# Patient Record
Sex: MALE
[De-identification: names, ages, dates, MRNs, and addresses within clinical notes are randomized per-mention and may not be internally consistent; named-entity substitution may affect disease eponyms.]

---

## 2015-05-08 RX ORDER — INSULIN GLARGINE 100 [IU]/ML
46 INJECTION, SOLUTION SUBCUTANEOUS
Qty: 0 | Refills: 0 | COMMUNITY
Start: 2015-05-08

## 2017-02-06 ENCOUNTER — APPOINTMENT (OUTPATIENT)
Dept: THORACIC SURGERY | Facility: CLINIC | Age: 67
End: 2017-02-06

## 2017-02-06 VITALS
OXYGEN SATURATION: 95 % | DIASTOLIC BLOOD PRESSURE: 81 MMHG | WEIGHT: 270 LBS | HEIGHT: 75 IN | BODY MASS INDEX: 33.57 KG/M2 | SYSTOLIC BLOOD PRESSURE: 121 MMHG | HEART RATE: 94 BPM | RESPIRATION RATE: 16 BRPM

## 2017-02-23 ENCOUNTER — OUTPATIENT (OUTPATIENT)
Dept: OUTPATIENT SERVICES | Facility: HOSPITAL | Age: 67
LOS: 1 days | End: 2017-02-23
Payer: MEDICAID

## 2017-02-23 DIAGNOSIS — Z45.2 ENCOUNTER FOR ADJUSTMENT AND MANAGEMENT OF VASCULAR ACCESS DEVICE: ICD-10-CM

## 2017-02-23 PROCEDURE — 36598 INJ W/FLUOR EVAL CV DEVICE: CPT

## 2017-02-23 PROCEDURE — 96522 REFILL/MAINT PUMP/RESVR SYST: CPT

## 2017-02-23 PROCEDURE — 76000 FLUOROSCOPY <1 HR PHYS/QHP: CPT

## 2017-03-13 ENCOUNTER — APPOINTMENT (OUTPATIENT)
Dept: THORACIC SURGERY | Facility: CLINIC | Age: 67
End: 2017-03-13

## 2017-03-21 ENCOUNTER — OUTPATIENT (OUTPATIENT)
Dept: OUTPATIENT SERVICES | Facility: HOSPITAL | Age: 67
LOS: 1 days | End: 2017-03-21
Payer: MEDICAID

## 2017-03-21 VITALS
TEMPERATURE: 99 F | HEIGHT: 75 IN | DIASTOLIC BLOOD PRESSURE: 87 MMHG | WEIGHT: 288.36 LBS | RESPIRATION RATE: 16 BRPM | SYSTOLIC BLOOD PRESSURE: 152 MMHG | HEART RATE: 70 BPM

## 2017-03-21 DIAGNOSIS — E78.00 PURE HYPERCHOLESTEROLEMIA, UNSPECIFIED: ICD-10-CM

## 2017-03-21 DIAGNOSIS — J98.4 OTHER DISORDERS OF LUNG: ICD-10-CM

## 2017-03-21 DIAGNOSIS — E11.9 TYPE 2 DIABETES MELLITUS WITHOUT COMPLICATIONS: ICD-10-CM

## 2017-03-21 DIAGNOSIS — I10 ESSENTIAL (PRIMARY) HYPERTENSION: ICD-10-CM

## 2017-03-21 DIAGNOSIS — Z95.828 PRESENCE OF OTHER VASCULAR IMPLANTS AND GRAFTS: Chronic | ICD-10-CM

## 2017-03-21 DIAGNOSIS — Z01.818 ENCOUNTER FOR OTHER PREPROCEDURAL EXAMINATION: ICD-10-CM

## 2017-03-21 LAB
ANION GAP SERPL CALC-SCNC: 13 MMOL/L — SIGNIFICANT CHANGE UP (ref 5–17)
APTT BLD: 28.9 SEC — SIGNIFICANT CHANGE UP (ref 27.5–37.4)
BASOPHILS # BLD AUTO: 0 K/UL — SIGNIFICANT CHANGE UP (ref 0–0.2)
BASOPHILS NFR BLD AUTO: 0.3 % — SIGNIFICANT CHANGE UP (ref 0–2)
BUN SERPL-MCNC: 10 MG/DL — SIGNIFICANT CHANGE UP (ref 8–20)
CALCIUM SERPL-MCNC: 9 MG/DL — SIGNIFICANT CHANGE UP (ref 8.6–10.2)
CHLORIDE SERPL-SCNC: 102 MMOL/L — SIGNIFICANT CHANGE UP (ref 98–107)
CO2 SERPL-SCNC: 26 MMOL/L — SIGNIFICANT CHANGE UP (ref 22–29)
CREAT SERPL-MCNC: 0.52 MG/DL — SIGNIFICANT CHANGE UP (ref 0.5–1.3)
EOSINOPHIL # BLD AUTO: 0.2 K/UL — SIGNIFICANT CHANGE UP (ref 0–0.5)
EOSINOPHIL NFR BLD AUTO: 2.5 % — SIGNIFICANT CHANGE UP (ref 0–5)
GLUCOSE SERPL-MCNC: 172 MG/DL — HIGH (ref 70–115)
HCT VFR BLD CALC: 35.1 % — LOW (ref 42–52)
HGB BLD-MCNC: 11.2 G/DL — LOW (ref 14–18)
INR BLD: 1.03 RATIO — SIGNIFICANT CHANGE UP (ref 0.88–1.16)
LYMPHOCYTES # BLD AUTO: 3.4 K/UL — SIGNIFICANT CHANGE UP (ref 1–4.8)
LYMPHOCYTES # BLD AUTO: 44.4 % — SIGNIFICANT CHANGE UP (ref 20–55)
MCHC RBC-ENTMCNC: 24.1 PG — LOW (ref 27–31)
MCHC RBC-ENTMCNC: 31.9 G/DL — LOW (ref 32–36)
MCV RBC AUTO: 75.5 FL — LOW (ref 80–94)
MONOCYTES # BLD AUTO: 0.7 K/UL — SIGNIFICANT CHANGE UP (ref 0–0.8)
MONOCYTES NFR BLD AUTO: 8.9 % — SIGNIFICANT CHANGE UP (ref 3–10)
NEUTROPHILS # BLD AUTO: 3.4 K/UL — SIGNIFICANT CHANGE UP (ref 1.8–8)
NEUTROPHILS NFR BLD AUTO: 43.6 % — SIGNIFICANT CHANGE UP (ref 37–73)
PLATELET # BLD AUTO: 242 K/UL — SIGNIFICANT CHANGE UP (ref 150–400)
POTASSIUM SERPL-MCNC: 3.6 MMOL/L — SIGNIFICANT CHANGE UP (ref 3.5–5.3)
POTASSIUM SERPL-SCNC: 3.6 MMOL/L — SIGNIFICANT CHANGE UP (ref 3.5–5.3)
PROTHROM AB SERPL-ACNC: 11.3 SEC — SIGNIFICANT CHANGE UP (ref 9.8–12.7)
RBC # BLD: 4.65 M/UL — SIGNIFICANT CHANGE UP (ref 4.6–6.2)
RBC # FLD: 18.2 % — HIGH (ref 11–15.6)
SODIUM SERPL-SCNC: 141 MMOL/L — SIGNIFICANT CHANGE UP (ref 135–145)
WBC # BLD: 7.7 K/UL — SIGNIFICANT CHANGE UP (ref 4.8–10.8)
WBC # FLD AUTO: 7.7 K/UL — SIGNIFICANT CHANGE UP (ref 4.8–10.8)

## 2017-03-21 PROCEDURE — 80048 BASIC METABOLIC PNL TOTAL CA: CPT

## 2017-03-21 PROCEDURE — G0463: CPT

## 2017-03-21 PROCEDURE — 93005 ELECTROCARDIOGRAM TRACING: CPT

## 2017-03-21 PROCEDURE — 85027 COMPLETE CBC AUTOMATED: CPT

## 2017-03-21 PROCEDURE — 93010 ELECTROCARDIOGRAM REPORT: CPT

## 2017-03-21 PROCEDURE — 85610 PROTHROMBIN TIME: CPT

## 2017-03-21 PROCEDURE — 85730 THROMBOPLASTIN TIME PARTIAL: CPT

## 2017-03-21 RX ORDER — SODIUM CHLORIDE 9 MG/ML
3 INJECTION INTRAMUSCULAR; INTRAVENOUS; SUBCUTANEOUS ONCE
Qty: 0 | Refills: 0 | Status: DISCONTINUED | OUTPATIENT
Start: 2017-04-04 | End: 2017-04-19

## 2017-03-21 NOTE — ASU PATIENT PROFILE, ADULT - MUTUALITY COMMENT, PROFILE
4-3-17 telephone update re-scheduled from [id number] to need for medical clearance. medical clearance on the chart. Pre-op instructions reviewed with patient, pt verbalized understanding

## 2017-03-21 NOTE — ASU PATIENT PROFILE, ADULT - PMH
Cough    Diabetes    DM (diabetes mellitus)    Hepatitis C  diagnosed a few years ago as per pt no treatment. iv drug use as teenager  High cholesterol    HTN (hypertension)    Hypertension    Lung nodule

## 2017-03-21 NOTE — H&P PST ADULT - HISTORY OF PRESENT ILLNESS
66 year old black male who is scheduled for a [id number] removal and  placement of a new one to continue chemo/immune therapy  for lung cancer, he said the present port is not functional

## 2017-03-21 NOTE — H&P PST ADULT - NSANTHOSAYNRD_GEN_A_CORE
No. MONSERRAT screening performed.  STOP BANG Legend: 0-2 = LOW Risk; 3-4 = INTERMEDIATE Risk; 5-8 = HIGH Risk

## 2017-03-22 DIAGNOSIS — J98.4 OTHER DISORDERS OF LUNG: ICD-10-CM

## 2017-03-22 DIAGNOSIS — Z01.818 ENCOUNTER FOR OTHER PREPROCEDURAL EXAMINATION: ICD-10-CM

## 2017-04-03 ENCOUNTER — RESULT REVIEW (OUTPATIENT)
Age: 67
End: 2017-04-03

## 2017-04-03 RX ORDER — CEFAZOLIN SODIUM 1 G
2000 VIAL (EA) INJECTION ONCE
Qty: 0 | Refills: 0 | Status: DISCONTINUED | OUTPATIENT
Start: 2017-04-04 | End: 2017-04-19

## 2017-04-04 ENCOUNTER — OUTPATIENT (OUTPATIENT)
Dept: OUTPATIENT SERVICES | Facility: HOSPITAL | Age: 67
LOS: 1 days | End: 2017-04-04
Payer: MEDICAID

## 2017-04-04 VITALS
RESPIRATION RATE: 16 BRPM | DIASTOLIC BLOOD PRESSURE: 86 MMHG | TEMPERATURE: 98 F | OXYGEN SATURATION: 98 % | HEART RATE: 73 BPM | SYSTOLIC BLOOD PRESSURE: 140 MMHG

## 2017-04-04 VITALS
HEIGHT: 75 IN | TEMPERATURE: 97 F | DIASTOLIC BLOOD PRESSURE: 80 MMHG | SYSTOLIC BLOOD PRESSURE: 148 MMHG | WEIGHT: 288.36 LBS | RESPIRATION RATE: 16 BRPM | HEART RATE: 96 BPM | OXYGEN SATURATION: 95 %

## 2017-04-04 DIAGNOSIS — J98.4 OTHER DISORDERS OF LUNG: ICD-10-CM

## 2017-04-04 DIAGNOSIS — Z95.828 PRESENCE OF OTHER VASCULAR IMPLANTS AND GRAFTS: Chronic | ICD-10-CM

## 2017-04-04 PROCEDURE — 36590 REMOVAL TUNNELED CV CATH: CPT

## 2017-04-04 PROCEDURE — 88300 SURGICAL PATH GROSS: CPT

## 2017-04-04 PROCEDURE — 76000 FLUOROSCOPY <1 HR PHYS/QHP: CPT

## 2017-04-04 PROCEDURE — 36561 INSERT TUNNELED CV CATH: CPT

## 2017-04-04 PROCEDURE — 88300 SURGICAL PATH GROSS: CPT | Mod: 26

## 2017-04-04 PROCEDURE — 36561 INSERT TUNNELED CV CATH: CPT | Mod: RT

## 2017-04-04 PROCEDURE — 77001 FLUOROGUIDE FOR VEIN DEVICE: CPT | Mod: 26

## 2017-04-04 PROCEDURE — 76937 US GUIDE VASCULAR ACCESS: CPT | Mod: 26

## 2017-04-04 PROCEDURE — C1788: CPT

## 2017-04-04 PROCEDURE — 76942 ECHO GUIDE FOR BIOPSY: CPT

## 2017-04-04 PROCEDURE — 36590 REMOVAL TUNNELED CV CATH: CPT | Mod: LT

## 2017-04-04 NOTE — BRIEF OPERATIVE NOTE - PROCEDURE
Infusaport removal  04/04/2017    Active  [id number]  Infusaport implantation  04/04/2017    Active  [id number]

## 2017-04-04 NOTE — ASU DISCHARGE PLAN (ADULT/PEDIATRIC). - MEDICATION SUMMARY - MEDICATIONS TO TAKE
I will START or STAY ON the medications listed below when I get home from the hospital:    losartan 100 mg oral tablet  -- 1 tab(s) by mouth once a day  -- Indication: For home    NovoLOG 100 units/mL subcutaneous solution  -- 5 unit(s) subcutaneous 3 times a day (before meals)  -- Indication: For home    glimepiride 4 mg oral tablet  -- 1 tab(s) by mouth once a day  -- Indication: For home    Lantus 100 units/mL subcutaneous solution  -- 28 unit(s) subcutaneous once a day  -- Indication: For home    insulin glargine 100 units/mL subcutaneous solution  -- 46 unit(s) subcutaneous once a day (at bedtime)  -- Indication: For home    simvastatin 20 mg oral tablet  -- 1 tab(s) by mouth once a day (at bedtime)  -- Indication: For home    metoprolol succinate 50 mg oral tablet, extended release  -- 1 tab(s) by mouth once a day  -- Indication: For home    amLODIPine 10 mg oral tablet  -- 1 tab(s) by mouth once a day  -- Indication: For home    omeprazole 20 mg oral delayed release capsule  -- 1 cap(s) by mouth once a day  -- Indication: For home

## 2017-04-11 LAB — SURGICAL PATHOLOGY FINAL REPORT - CH: SIGNIFICANT CHANGE UP

## 2017-06-16 ENCOUNTER — EMERGENCY (EMERGENCY)
Facility: HOSPITAL | Age: 67
LOS: 1 days | Discharge: LEFT BEFORE DISCHARGE | End: 2017-06-16
Admitting: EMERGENCY MEDICINE

## 2017-06-16 VITALS — WEIGHT: 274.92 LBS | HEIGHT: 75 IN

## 2017-06-16 VITALS
RESPIRATION RATE: 18 BRPM | TEMPERATURE: 98 F | HEART RATE: 83 BPM | OXYGEN SATURATION: 97 % | DIASTOLIC BLOOD PRESSURE: 65 MMHG | SYSTOLIC BLOOD PRESSURE: 108 MMHG

## 2017-06-16 DIAGNOSIS — Z95.828 PRESENCE OF OTHER VASCULAR IMPLANTS AND GRAFTS: Chronic | ICD-10-CM

## 2017-06-19 ENCOUNTER — INPATIENT (INPATIENT)
Facility: HOSPITAL | Age: 67
LOS: 10 days | Discharge: NURSING, REHAB OR LONG-TERM CARE | DRG: 193 | End: 2017-06-30
Attending: HOSPITALIST | Admitting: INTERNAL MEDICINE
Payer: MEDICARE

## 2017-06-19 VITALS
TEMPERATURE: 98 F | HEART RATE: 90 BPM | DIASTOLIC BLOOD PRESSURE: 60 MMHG | SYSTOLIC BLOOD PRESSURE: 118 MMHG | RESPIRATION RATE: 28 BRPM | WEIGHT: 270.07 LBS | OXYGEN SATURATION: 94 %

## 2017-06-19 DIAGNOSIS — Z95.828 PRESENCE OF OTHER VASCULAR IMPLANTS AND GRAFTS: Chronic | ICD-10-CM

## 2017-06-19 DIAGNOSIS — R06.02 SHORTNESS OF BREATH: ICD-10-CM

## 2017-06-19 LAB
ALBUMIN SERPL ELPH-MCNC: 3.5 G/DL — SIGNIFICANT CHANGE UP (ref 3.3–5.2)
ALBUMIN SERPL ELPH-MCNC: 3.6 G/DL — SIGNIFICANT CHANGE UP (ref 3.3–5.2)
ALP SERPL-CCNC: 82 U/L — SIGNIFICANT CHANGE UP (ref 40–120)
ALP SERPL-CCNC: 91 U/L — SIGNIFICANT CHANGE UP (ref 40–120)
ALT FLD-CCNC: 14 U/L — SIGNIFICANT CHANGE UP
ALT FLD-CCNC: 16 U/L — SIGNIFICANT CHANGE UP
ANION GAP SERPL CALC-SCNC: 20 MMOL/L — HIGH (ref 5–17)
ANION GAP SERPL CALC-SCNC: 21 MMOL/L — HIGH (ref 5–17)
APTT BLD: 26.9 SEC — LOW (ref 27.5–37.4)
AST SERPL-CCNC: 10 U/L — SIGNIFICANT CHANGE UP
AST SERPL-CCNC: 14 U/L — SIGNIFICANT CHANGE UP
BASOPHILS # BLD AUTO: 0 K/UL — SIGNIFICANT CHANGE UP (ref 0–0.2)
BILIRUB SERPL-MCNC: 0.6 MG/DL — SIGNIFICANT CHANGE UP (ref 0.4–2)
BILIRUB SERPL-MCNC: 0.7 MG/DL — SIGNIFICANT CHANGE UP (ref 0.4–2)
BUN SERPL-MCNC: 53 MG/DL — HIGH (ref 8–20)
BUN SERPL-MCNC: 61 MG/DL — HIGH (ref 8–20)
CALCIUM SERPL-MCNC: 9.7 MG/DL — SIGNIFICANT CHANGE UP (ref 8.6–10.2)
CALCIUM SERPL-MCNC: 9.8 MG/DL — SIGNIFICANT CHANGE UP (ref 8.6–10.2)
CHLORIDE SERPL-SCNC: 93 MMOL/L — LOW (ref 98–107)
CHLORIDE SERPL-SCNC: 94 MMOL/L — LOW (ref 98–107)
CO2 SERPL-SCNC: 16 MMOL/L — LOW (ref 22–29)
CO2 SERPL-SCNC: 18 MMOL/L — LOW (ref 22–29)
CREAT SERPL-MCNC: 2.19 MG/DL — HIGH (ref 0.5–1.3)
CREAT SERPL-MCNC: 2.86 MG/DL — HIGH (ref 0.5–1.3)
GAS PNL BLDA: SIGNIFICANT CHANGE UP
GLUCOSE SERPL-MCNC: 528 MG/DL — CRITICAL HIGH (ref 70–115)
GLUCOSE SERPL-MCNC: 541 MG/DL — CRITICAL HIGH (ref 70–115)
HCT VFR BLD CALC: 44.9 % — SIGNIFICANT CHANGE UP (ref 42–52)
HGB BLD-MCNC: 15 G/DL — SIGNIFICANT CHANGE UP (ref 14–18)
INR BLD: 1.42 RATIO — HIGH (ref 0.88–1.16)
LYMPHOCYTES # BLD AUTO: 2 K/UL — SIGNIFICANT CHANGE UP (ref 1–4.8)
LYMPHOCYTES # BLD AUTO: 9 % — LOW (ref 20–55)
MCHC RBC-ENTMCNC: 25.3 PG — LOW (ref 27–31)
MCHC RBC-ENTMCNC: 33.4 G/DL — SIGNIFICANT CHANGE UP (ref 32–36)
MCV RBC AUTO: 75.8 FL — LOW (ref 80–94)
MONOCYTES # BLD AUTO: 1.3 K/UL — HIGH (ref 0–0.8)
MONOCYTES NFR BLD AUTO: 6 % — SIGNIFICANT CHANGE UP (ref 3–10)
NEUTROPHILS # BLD AUTO: 18.4 K/UL — HIGH (ref 1.8–8)
NEUTROPHILS NFR BLD AUTO: 84.4 % — HIGH (ref 37–73)
PLATELET # BLD AUTO: 212 K/UL — SIGNIFICANT CHANGE UP (ref 150–400)
POTASSIUM SERPL-MCNC: 5.6 MMOL/L — HIGH (ref 3.5–5.3)
POTASSIUM SERPL-MCNC: 6.8 MMOL/L — CRITICAL HIGH (ref 3.5–5.3)
POTASSIUM SERPL-SCNC: 5.6 MMOL/L — HIGH (ref 3.5–5.3)
POTASSIUM SERPL-SCNC: 6.8 MMOL/L — CRITICAL HIGH (ref 3.5–5.3)
PROT SERPL-MCNC: 9.2 G/DL — HIGH (ref 6.6–8.7)
PROT SERPL-MCNC: 9.6 G/DL — HIGH (ref 6.6–8.7)
PROTHROM AB SERPL-ACNC: 15.7 SEC — HIGH (ref 9.8–12.7)
RBC # BLD: 5.92 M/UL — SIGNIFICANT CHANGE UP (ref 4.6–6.2)
RBC # FLD: 15.8 % — HIGH (ref 11–15.6)
SODIUM SERPL-SCNC: 131 MMOL/L — LOW (ref 135–145)
SODIUM SERPL-SCNC: 131 MMOL/L — LOW (ref 135–145)
WBC # BLD: 21.8 K/UL — HIGH (ref 4.8–10.8)
WBC # FLD AUTO: 21.8 K/UL — HIGH (ref 4.8–10.8)

## 2017-06-19 PROCEDURE — 74176 CT ABD & PELVIS W/O CONTRAST: CPT | Mod: 26

## 2017-06-19 PROCEDURE — 99291 CRITICAL CARE FIRST HOUR: CPT

## 2017-06-19 PROCEDURE — 71250 CT THORAX DX C-: CPT | Mod: 26

## 2017-06-19 PROCEDURE — 71010: CPT | Mod: 26

## 2017-06-19 RX ORDER — CEFEPIME 1 G/1
1000 INJECTION, POWDER, FOR SOLUTION INTRAMUSCULAR; INTRAVENOUS EVERY 12 HOURS
Qty: 0 | Refills: 0 | Status: COMPLETED | OUTPATIENT
Start: 2017-06-20 | End: 2017-06-26

## 2017-06-19 RX ORDER — VANCOMYCIN HCL 1 G
1000 VIAL (EA) INTRAVENOUS ONCE
Qty: 0 | Refills: 0 | Status: COMPLETED | OUTPATIENT
Start: 2017-06-19 | End: 2017-06-20

## 2017-06-19 RX ORDER — SODIUM BICARBONATE 1 MEQ/ML
50 SYRINGE (ML) INTRAVENOUS ONCE
Qty: 0 | Refills: 0 | Status: COMPLETED | OUTPATIENT
Start: 2017-06-19 | End: 2017-06-19

## 2017-06-19 RX ORDER — AZITHROMYCIN 500 MG/1
500 TABLET, FILM COATED ORAL EVERY 24 HOURS
Qty: 0 | Refills: 0 | Status: DISCONTINUED | OUTPATIENT
Start: 2017-06-20 | End: 2017-06-21

## 2017-06-19 RX ORDER — AZITHROMYCIN 500 MG/1
TABLET, FILM COATED ORAL
Qty: 0 | Refills: 0 | Status: DISCONTINUED | OUTPATIENT
Start: 2017-06-19 | End: 2017-06-21

## 2017-06-19 RX ORDER — CEFEPIME 1 G/1
INJECTION, POWDER, FOR SOLUTION INTRAMUSCULAR; INTRAVENOUS
Qty: 0 | Refills: 0 | Status: DISCONTINUED | OUTPATIENT
Start: 2017-06-19 | End: 2017-06-19

## 2017-06-19 RX ORDER — SODIUM CHLORIDE 9 MG/ML
1000 INJECTION INTRAMUSCULAR; INTRAVENOUS; SUBCUTANEOUS
Qty: 0 | Refills: 0 | Status: DISCONTINUED | OUTPATIENT
Start: 2017-06-19 | End: 2017-06-20

## 2017-06-19 RX ORDER — SODIUM POLYSTYRENE SULFONATE 4.1 MEQ/G
30 POWDER, FOR SUSPENSION ORAL ONCE
Qty: 0 | Refills: 0 | Status: COMPLETED | OUTPATIENT
Start: 2017-06-19 | End: 2017-06-19

## 2017-06-19 RX ORDER — AZITHROMYCIN 500 MG/1
500 TABLET, FILM COATED ORAL ONCE
Qty: 0 | Refills: 0 | Status: COMPLETED | OUTPATIENT
Start: 2017-06-19 | End: 2017-06-19

## 2017-06-19 RX ORDER — MORPHINE SULFATE 50 MG/1
8 CAPSULE, EXTENDED RELEASE ORAL ONCE
Qty: 0 | Refills: 0 | Status: DISCONTINUED | OUTPATIENT
Start: 2017-06-19 | End: 2017-06-19

## 2017-06-19 RX ORDER — HEPARIN SODIUM 5000 [USP'U]/ML
5000 INJECTION INTRAVENOUS; SUBCUTANEOUS EVERY 12 HOURS
Qty: 0 | Refills: 0 | Status: DISCONTINUED | OUTPATIENT
Start: 2017-06-19 | End: 2017-06-20

## 2017-06-19 RX ORDER — DEXTROSE 50 % IN WATER 50 %
25 SYRINGE (ML) INTRAVENOUS
Qty: 0 | Refills: 0 | Status: DISCONTINUED | OUTPATIENT
Start: 2017-06-19 | End: 2017-06-23

## 2017-06-19 RX ORDER — CEFEPIME 1 G/1
1000 INJECTION, POWDER, FOR SOLUTION INTRAMUSCULAR; INTRAVENOUS ONCE
Qty: 0 | Refills: 0 | Status: COMPLETED | OUTPATIENT
Start: 2017-06-19 | End: 2017-06-19

## 2017-06-19 RX ORDER — SODIUM CHLORIDE 9 MG/ML
3 INJECTION INTRAMUSCULAR; INTRAVENOUS; SUBCUTANEOUS ONCE
Qty: 0 | Refills: 0 | Status: COMPLETED | OUTPATIENT
Start: 2017-06-19 | End: 2017-06-19

## 2017-06-19 RX ORDER — DEXTROSE 50 % IN WATER 50 %
50 SYRINGE (ML) INTRAVENOUS
Qty: 0 | Refills: 0 | Status: DISCONTINUED | OUTPATIENT
Start: 2017-06-19 | End: 2017-06-20

## 2017-06-19 RX ORDER — INSULIN HUMAN 100 [IU]/ML
5 INJECTION, SOLUTION SUBCUTANEOUS
Qty: 100 | Refills: 0 | Status: DISCONTINUED | OUTPATIENT
Start: 2017-06-19 | End: 2017-06-21

## 2017-06-19 RX ORDER — INSULIN HUMAN 100 [IU]/ML
15 INJECTION, SOLUTION SUBCUTANEOUS ONCE
Qty: 0 | Refills: 0 | Status: COMPLETED | OUTPATIENT
Start: 2017-06-19 | End: 2017-06-19

## 2017-06-19 RX ORDER — CEFEPIME 1 G/1
INJECTION, POWDER, FOR SOLUTION INTRAMUSCULAR; INTRAVENOUS
Qty: 0 | Refills: 0 | Status: COMPLETED | OUTPATIENT
Start: 2017-06-19 | End: 2017-06-26

## 2017-06-19 RX ADMIN — INSULIN HUMAN 15 UNIT(S): 100 INJECTION, SOLUTION SUBCUTANEOUS at 18:43

## 2017-06-19 RX ADMIN — Medication 50 MILLIEQUIVALENT(S): at 18:43

## 2017-06-19 RX ADMIN — AZITHROMYCIN 255 MILLIGRAM(S): 500 TABLET, FILM COATED ORAL at 22:30

## 2017-06-19 RX ADMIN — CEFEPIME 100 MILLIGRAM(S): 1 INJECTION, POWDER, FOR SOLUTION INTRAMUSCULAR; INTRAVENOUS at 20:52

## 2017-06-19 RX ADMIN — INSULIN HUMAN 5 UNIT(S)/HR: 100 INJECTION, SOLUTION SUBCUTANEOUS at 21:00

## 2017-06-19 RX ADMIN — SODIUM CHLORIDE 3 MILLILITER(S): 9 INJECTION INTRAMUSCULAR; INTRAVENOUS; SUBCUTANEOUS at 18:43

## 2017-06-19 RX ADMIN — SODIUM POLYSTYRENE SULFONATE 30 GRAM(S): 4.1 POWDER, FOR SUSPENSION ORAL at 18:43

## 2017-06-19 RX ADMIN — MORPHINE SULFATE 8 MILLIGRAM(S): 50 CAPSULE, EXTENDED RELEASE ORAL at 17:57

## 2017-06-19 RX ADMIN — SODIUM CHLORIDE 150 MILLILITER(S): 9 INJECTION INTRAMUSCULAR; INTRAVENOUS; SUBCUTANEOUS at 19:22

## 2017-06-19 RX ADMIN — MORPHINE SULFATE 8 MILLIGRAM(S): 50 CAPSULE, EXTENDED RELEASE ORAL at 18:10

## 2017-06-19 NOTE — ED PROVIDER NOTE - OBJECTIVE STATEMENT
66 yo M with hx of lung Ca presents to ED after having chemotx today c/o increasing abd pain and SOB since yesterday.  Pt c/o diffuse periumbilical abd pain which radiates to L upper abd/chest area.  Pt denies any CP.  Pt noted to be extremely diaphoretic

## 2017-06-19 NOTE — ED ADULT NURSE REASSESSMENT NOTE - NS ED NURSE REASSESS COMMENT FT1
Assumed pt. care from Joseph KITCHEN. Pt. is a/o x 3 connected to the cardiac monitor. Pt. is on BiPap sat 94% on 50% O2. Pt. is resting in bed comfortably and denies any pain at this time. Pt. has equal bilateral chest rise with equal pulses bilat. Pt. shows no signs of apparent distress at this time. Called report to transport pt. to MICU.

## 2017-06-19 NOTE — ED PROVIDER NOTE - CONSTITUTIONAL, MLM
normal... Awake, alert, oriented to person, place, time/situation,  diaphoretic and appears uncomfortable

## 2017-06-19 NOTE — H&P ADULT - ATTENDING COMMENTS
I have seen and evaluated the patient and agree with the assessment and plan with the following additions:    PNA - vanco renal dosed, cefepime and azithro    Lung CA on chemo    DM: insulin drip    Pain - unclear cause, meds as needed ? pleuric pain

## 2017-06-19 NOTE — H&P ADULT - HISTORY OF PRESENT ILLNESS
67M with a h/o DM, HTN, Hep C, former IVDA, with NSC Lung Ca, currently on Chemo for the last 2 years. had Chemo today, it was stopped because he didnt feel well. and he was sent to the ER. he was found to be diaphoretic, and in respiratory distress with O2 sats that were in the low 90s. Initial Labs revealed a WBC 21K, K 6.8,lacate of 2.1, BUN/Crt 52/2.2 and a blood glucose of 544.  His CXR revealed a New large LLL infiltrate. He was started on IV fluids, ultures were sent and He was begun on IV abx. His EKG showed no ischemic changes.

## 2017-06-19 NOTE — ED ADULT TRIAGE NOTE - CHIEF COMPLAINT QUOTE
Hx of lung cancer sent from hematology/oncology center after chemo treatment for left sided abdominal pain and SOB.  Pt arrives diaphoretic, alert and oriented X 3.  Pain to LUQ.

## 2017-06-19 NOTE — ED ADULT NURSE NOTE - OBJECTIVE STATEMENT
Patient recd this afternoon A/Ox3, VSS, presents to ED c/o SOB that started last night.  Labor breathing noted, patient diaphoretic since last night.  Patient sent in by oncologist office.  Patient reports left sided chest pain.  Respirations are even and labored, lungs cta, +bowel x4 quads, abdomen soft, nontender/nondistended, skin w/d/i.

## 2017-06-19 NOTE — H&P ADULT - ASSESSMENT
IMpression/Plan:    1) Respiratory Distress/Hypoxemia: Likely related to new pneumonia. With his immunocompromised state will cover with Cefepime/Vanco, given his healthcare associated visits and atypicals with Azithromycin, Doubt PE at this time, given his presentation of worsening over days and CXR findings. Will obtain a CT of [facility] chest now. Also add nebs and nocturnal BIPAP. Will add SQ heparin for DVT prophylaxis given his elevated BUN/Crt    2) Acute renal failure with hyperkalemia and metabolic acidosis: Likely prerenal in nature, with severe hyperglycemia and osmotic diuresis. Will hydrate with NS at this point, repeat Electrolytes. Kayexalate given as well as insulin.  Will avoid nephrotoxic drugs and F/U     3) Severe Hyperglycemia with metabolic acidosis: Can not exclude mild DKA, although the acidosis appears to be largely related to the ARF and Sepsis. Will treat with an insulin drip and and follow electrolytes at this point.    4) NSC Lung CA: With septic state will need to stop further CHemo at this point. Will contact Dr Madrid, his oncologist to discuss recent care and plans:      A total of 60 mins was spent evaluating and treating this critical patient

## 2017-06-19 NOTE — ED PROVIDER NOTE - CARE PLAN
Principal Discharge DX:	Shortness of breath  Secondary Diagnosis:	Renal failure  Secondary Diagnosis:	Lung cancer

## 2017-06-19 NOTE — ED PROVIDER NOTE - CRITICAL CARE PROVIDED
additional history taking/consultation with other physicians/documentation/interpretation of diagnostic studies/direct patient care (not related to procedure)

## 2017-06-20 LAB
ANION GAP SERPL CALC-SCNC: 17 MMOL/L — SIGNIFICANT CHANGE UP (ref 5–17)
ANION GAP SERPL CALC-SCNC: 19 MMOL/L — HIGH (ref 5–17)
APPEARANCE UR: CLEAR — SIGNIFICANT CHANGE UP
BACTERIA # UR AUTO: ABNORMAL
BILIRUB UR-MCNC: NEGATIVE — SIGNIFICANT CHANGE UP
BUN SERPL-MCNC: 62 MG/DL — HIGH (ref 8–20)
BUN SERPL-MCNC: 68 MG/DL — HIGH (ref 8–20)
CALCIUM SERPL-MCNC: 8.9 MG/DL — SIGNIFICANT CHANGE UP (ref 8.6–10.2)
CALCIUM SERPL-MCNC: 8.9 MG/DL — SIGNIFICANT CHANGE UP (ref 8.6–10.2)
CHLORIDE SERPL-SCNC: 100 MMOL/L — SIGNIFICANT CHANGE UP (ref 98–107)
CHLORIDE SERPL-SCNC: 95 MMOL/L — LOW (ref 98–107)
CO2 SERPL-SCNC: 18 MMOL/L — LOW (ref 22–29)
CO2 SERPL-SCNC: 18 MMOL/L — LOW (ref 22–29)
COLOR SPEC: YELLOW — SIGNIFICANT CHANGE UP
CREAT SERPL-MCNC: 2.34 MG/DL — HIGH (ref 0.5–1.3)
CREAT SERPL-MCNC: 2.71 MG/DL — HIGH (ref 0.5–1.3)
DIFF PNL FLD: NEGATIVE — SIGNIFICANT CHANGE UP
EPI CELLS # UR: SIGNIFICANT CHANGE UP
GLUCOSE SERPL-MCNC: 156 MG/DL — HIGH (ref 70–115)
GLUCOSE SERPL-MCNC: 191 MG/DL — HIGH (ref 70–115)
GLUCOSE UR QL: 250 MG/DL
GRAN CASTS # UR COMP ASSIST: ABNORMAL /LPF
HBA1C BLD-MCNC: 12.6 % — HIGH (ref 4–5.6)
HCT VFR BLD CALC: 38.1 % — LOW (ref 42–52)
HGB BLD-MCNC: 12.7 G/DL — LOW (ref 14–18)
KETONES UR-MCNC: NEGATIVE — SIGNIFICANT CHANGE UP
LACTATE BLDV-MCNC: 1.2 MMOL/L — SIGNIFICANT CHANGE UP (ref 0.5–2)
LEUKOCYTE ESTERASE UR-ACNC: NEGATIVE — SIGNIFICANT CHANGE UP
MCHC RBC-ENTMCNC: 25.3 PG — LOW (ref 27–31)
MCHC RBC-ENTMCNC: 33.3 G/DL — SIGNIFICANT CHANGE UP (ref 32–36)
MCV RBC AUTO: 76 FL — LOW (ref 80–94)
NITRITE UR-MCNC: NEGATIVE — SIGNIFICANT CHANGE UP
PH UR: 5 — SIGNIFICANT CHANGE UP (ref 5–8)
PLATELET # BLD AUTO: 179 K/UL — SIGNIFICANT CHANGE UP (ref 150–400)
POTASSIUM SERPL-MCNC: 4.5 MMOL/L — SIGNIFICANT CHANGE UP (ref 3.5–5.3)
POTASSIUM SERPL-MCNC: 4.8 MMOL/L — SIGNIFICANT CHANGE UP (ref 3.5–5.3)
POTASSIUM SERPL-SCNC: 4.5 MMOL/L — SIGNIFICANT CHANGE UP (ref 3.5–5.3)
POTASSIUM SERPL-SCNC: 4.8 MMOL/L — SIGNIFICANT CHANGE UP (ref 3.5–5.3)
PROT UR-MCNC: 15 MG/DL
RBC # BLD: 5.01 M/UL — SIGNIFICANT CHANGE UP (ref 4.6–6.2)
RBC # FLD: 15.9 % — HIGH (ref 11–15.6)
RBC CASTS # UR COMP ASSIST: SIGNIFICANT CHANGE UP /HPF (ref 0–4)
SODIUM SERPL-SCNC: 132 MMOL/L — LOW (ref 135–145)
SODIUM SERPL-SCNC: 135 MMOL/L — SIGNIFICANT CHANGE UP (ref 135–145)
SP GR SPEC: 1.02 — SIGNIFICANT CHANGE UP (ref 1.01–1.02)
TROPONIN T SERPL-MCNC: <0.01 NG/ML — SIGNIFICANT CHANGE UP (ref 0–0.06)
URATE CRY FLD QL MICRO: ABNORMAL
UROBILINOGEN FLD QL: NEGATIVE MG/DL — SIGNIFICANT CHANGE UP
VANCOMYCIN TROUGH SERPL-MCNC: 11.7 UG/ML — SIGNIFICANT CHANGE UP (ref 10–20)
WBC # BLD: 22 K/UL — HIGH (ref 4.8–10.8)
WBC # FLD AUTO: 22 K/UL — HIGH (ref 4.8–10.8)
WBC UR QL: SIGNIFICANT CHANGE UP

## 2017-06-20 PROCEDURE — 99233 SBSQ HOSP IP/OBS HIGH 50: CPT

## 2017-06-20 RX ORDER — SODIUM CHLORIDE 9 MG/ML
1000 INJECTION, SOLUTION INTRAVENOUS
Qty: 0 | Refills: 0 | Status: DISCONTINUED | OUTPATIENT
Start: 2017-06-20 | End: 2017-06-20

## 2017-06-20 RX ORDER — VANCOMYCIN HCL 1 G
1500 VIAL (EA) INTRAVENOUS ONCE
Qty: 0 | Refills: 0 | Status: COMPLETED | OUTPATIENT
Start: 2017-06-20 | End: 2017-06-20

## 2017-06-20 RX ORDER — PANTOPRAZOLE SODIUM 20 MG/1
40 TABLET, DELAYED RELEASE ORAL
Qty: 0 | Refills: 0 | Status: DISCONTINUED | OUTPATIENT
Start: 2017-06-20 | End: 2017-06-20

## 2017-06-20 RX ORDER — MORPHINE SULFATE 50 MG/1
2 CAPSULE, EXTENDED RELEASE ORAL EVERY 4 HOURS
Qty: 0 | Refills: 0 | Status: DISCONTINUED | OUTPATIENT
Start: 2017-06-20 | End: 2017-06-20

## 2017-06-20 RX ORDER — MORPHINE SULFATE 50 MG/1
2 CAPSULE, EXTENDED RELEASE ORAL ONCE
Qty: 0 | Refills: 0 | Status: DISCONTINUED | OUTPATIENT
Start: 2017-06-20 | End: 2017-06-20

## 2017-06-20 RX ORDER — MORPHINE SULFATE 50 MG/1
2 CAPSULE, EXTENDED RELEASE ORAL EVERY 4 HOURS
Qty: 0 | Refills: 0 | Status: DISCONTINUED | OUTPATIENT
Start: 2017-06-20 | End: 2017-06-23

## 2017-06-20 RX ADMIN — PANTOPRAZOLE SODIUM 40 MILLIGRAM(S): 20 TABLET, DELAYED RELEASE ORAL at 11:43

## 2017-06-20 RX ADMIN — HEPARIN SODIUM 5000 UNIT(S): 5000 INJECTION INTRAVENOUS; SUBCUTANEOUS at 18:01

## 2017-06-20 RX ADMIN — MORPHINE SULFATE 2 MILLIGRAM(S): 50 CAPSULE, EXTENDED RELEASE ORAL at 18:15

## 2017-06-20 RX ADMIN — Medication 300 MILLIGRAM(S): at 11:43

## 2017-06-20 RX ADMIN — INSULIN HUMAN 5 UNIT(S)/HR: 100 INJECTION, SOLUTION SUBCUTANEOUS at 22:30

## 2017-06-20 RX ADMIN — MORPHINE SULFATE 2 MILLIGRAM(S): 50 CAPSULE, EXTENDED RELEASE ORAL at 08:20

## 2017-06-20 RX ADMIN — Medication 250 MILLIGRAM(S): at 00:00

## 2017-06-20 RX ADMIN — AZITHROMYCIN 255 MILLIGRAM(S): 500 TABLET, FILM COATED ORAL at 18:01

## 2017-06-20 RX ADMIN — CEFEPIME 100 MILLIGRAM(S): 1 INJECTION, POWDER, FOR SOLUTION INTRAMUSCULAR; INTRAVENOUS at 06:13

## 2017-06-20 RX ADMIN — MORPHINE SULFATE 2 MILLIGRAM(S): 50 CAPSULE, EXTENDED RELEASE ORAL at 18:00

## 2017-06-20 RX ADMIN — MORPHINE SULFATE 2 MILLIGRAM(S): 50 CAPSULE, EXTENDED RELEASE ORAL at 12:00

## 2017-06-20 RX ADMIN — MORPHINE SULFATE 2 MILLIGRAM(S): 50 CAPSULE, EXTENDED RELEASE ORAL at 11:45

## 2017-06-20 RX ADMIN — MORPHINE SULFATE 2 MILLIGRAM(S): 50 CAPSULE, EXTENDED RELEASE ORAL at 08:35

## 2017-06-20 RX ADMIN — SODIUM CHLORIDE 150 MILLILITER(S): 9 INJECTION, SOLUTION INTRAVENOUS at 06:13

## 2017-06-20 RX ADMIN — HEPARIN SODIUM 5000 UNIT(S): 5000 INJECTION INTRAVENOUS; SUBCUTANEOUS at 06:22

## 2017-06-20 RX ADMIN — INSULIN HUMAN 5 UNIT(S)/HR: 100 INJECTION, SOLUTION SUBCUTANEOUS at 04:00

## 2017-06-20 RX ADMIN — CEFEPIME 100 MILLIGRAM(S): 1 INJECTION, POWDER, FOR SOLUTION INTRAMUSCULAR; INTRAVENOUS at 18:00

## 2017-06-21 DIAGNOSIS — C34.90 MALIGNANT NEOPLASM OF UNSPECIFIED PART OF UNSPECIFIED BRONCHUS OR LUNG: ICD-10-CM

## 2017-06-21 DIAGNOSIS — N17.9 ACUTE KIDNEY FAILURE, UNSPECIFIED: ICD-10-CM

## 2017-06-21 DIAGNOSIS — E11.29 TYPE 2 DIABETES MELLITUS WITH OTHER DIABETIC KIDNEY COMPLICATION: ICD-10-CM

## 2017-06-21 DIAGNOSIS — J15.9 UNSPECIFIED BACTERIAL PNEUMONIA: ICD-10-CM

## 2017-06-21 DIAGNOSIS — R10.9 UNSPECIFIED ABDOMINAL PAIN: ICD-10-CM

## 2017-06-21 LAB
ANION GAP SERPL CALC-SCNC: 21 MMOL/L — HIGH (ref 5–17)
BUN SERPL-MCNC: 73 MG/DL — HIGH (ref 8–20)
CALCIUM SERPL-MCNC: 8.9 MG/DL — SIGNIFICANT CHANGE UP (ref 8.6–10.2)
CHLORIDE SERPL-SCNC: 96 MMOL/L — LOW (ref 98–107)
CO2 SERPL-SCNC: 18 MMOL/L — LOW (ref 22–29)
CREAT SERPL-MCNC: 2.67 MG/DL — HIGH (ref 0.5–1.3)
CULTURE RESULTS: NO GROWTH — SIGNIFICANT CHANGE UP
GLUCOSE SERPL-MCNC: 126 MG/DL — HIGH (ref 70–115)
GRAM STN FLD: SIGNIFICANT CHANGE UP
HCT VFR BLD CALC: 38.5 % — LOW (ref 42–52)
HGB BLD-MCNC: 12.8 G/DL — LOW (ref 14–18)
LACTATE SERPL-SCNC: 2 MMOL/L — SIGNIFICANT CHANGE UP (ref 0.5–2)
MAGNESIUM SERPL-MCNC: 2.1 MG/DL — SIGNIFICANT CHANGE UP (ref 1.8–2.6)
MCHC RBC-ENTMCNC: 25.2 PG — LOW (ref 27–31)
MCHC RBC-ENTMCNC: 33.2 G/DL — SIGNIFICANT CHANGE UP (ref 32–36)
MCV RBC AUTO: 75.9 FL — LOW (ref 80–94)
PHOSPHATE SERPL-MCNC: 6.6 MG/DL — HIGH (ref 2.4–4.7)
PLATELET # BLD AUTO: 175 K/UL — SIGNIFICANT CHANGE UP (ref 150–400)
POTASSIUM SERPL-MCNC: 4.3 MMOL/L — SIGNIFICANT CHANGE UP (ref 3.5–5.3)
POTASSIUM SERPL-SCNC: 4.3 MMOL/L — SIGNIFICANT CHANGE UP (ref 3.5–5.3)
RBC # BLD: 5.07 M/UL — SIGNIFICANT CHANGE UP (ref 4.6–6.2)
RBC # FLD: 16.2 % — HIGH (ref 11–15.6)
SODIUM SERPL-SCNC: 135 MMOL/L — SIGNIFICANT CHANGE UP (ref 135–145)
SPECIMEN SOURCE: SIGNIFICANT CHANGE UP
SPECIMEN SOURCE: SIGNIFICANT CHANGE UP
TROPONIN T SERPL-MCNC: <0.01 NG/ML — SIGNIFICANT CHANGE UP (ref 0–0.06)
WBC # BLD: 22.6 K/UL — HIGH (ref 4.8–10.8)
WBC # FLD AUTO: 22.6 K/UL — HIGH (ref 4.8–10.8)

## 2017-06-21 PROCEDURE — 99223 1ST HOSP IP/OBS HIGH 75: CPT

## 2017-06-21 PROCEDURE — 99233 SBSQ HOSP IP/OBS HIGH 50: CPT

## 2017-06-21 RX ORDER — AZITHROMYCIN 500 MG/1
500 TABLET, FILM COATED ORAL DAILY
Qty: 0 | Refills: 0 | Status: COMPLETED | OUTPATIENT
Start: 2017-06-21 | End: 2017-06-23

## 2017-06-21 RX ORDER — LACTULOSE 10 G/15ML
30 SOLUTION ORAL ONCE
Qty: 0 | Refills: 0 | Status: COMPLETED | OUTPATIENT
Start: 2017-06-21 | End: 2017-06-21

## 2017-06-21 RX ORDER — INSULIN LISPRO 100/ML
5 VIAL (ML) SUBCUTANEOUS
Qty: 0 | Refills: 0 | Status: DISCONTINUED | OUTPATIENT
Start: 2017-06-21 | End: 2017-06-21

## 2017-06-21 RX ORDER — INSULIN LISPRO 100/ML
VIAL (ML) SUBCUTANEOUS
Qty: 0 | Refills: 0 | Status: DISCONTINUED | OUTPATIENT
Start: 2017-06-21 | End: 2017-06-22

## 2017-06-21 RX ORDER — INSULIN LISPRO 100/ML
5 VIAL (ML) SUBCUTANEOUS
Qty: 0 | Refills: 0 | Status: DISCONTINUED | OUTPATIENT
Start: 2017-06-21 | End: 2017-06-22

## 2017-06-21 RX ORDER — SODIUM CHLORIDE 9 MG/ML
1000 INJECTION, SOLUTION INTRAVENOUS
Qty: 0 | Refills: 0 | Status: DISCONTINUED | OUTPATIENT
Start: 2017-06-21 | End: 2017-06-22

## 2017-06-21 RX ORDER — METOCLOPRAMIDE HCL 10 MG
5 TABLET ORAL ONCE
Qty: 0 | Refills: 0 | Status: COMPLETED | OUTPATIENT
Start: 2017-06-21 | End: 2017-06-21

## 2017-06-21 RX ORDER — INSULIN LISPRO 100/ML
VIAL (ML) SUBCUTANEOUS
Qty: 0 | Refills: 0 | Status: DISCONTINUED | OUTPATIENT
Start: 2017-06-21 | End: 2017-06-21

## 2017-06-21 RX ORDER — INSULIN GLARGINE 100 [IU]/ML
40 INJECTION, SOLUTION SUBCUTANEOUS EVERY MORNING
Qty: 0 | Refills: 0 | Status: DISCONTINUED | OUTPATIENT
Start: 2017-06-21 | End: 2017-06-23

## 2017-06-21 RX ADMIN — INSULIN GLARGINE 40 UNIT(S): 100 INJECTION, SOLUTION SUBCUTANEOUS at 12:34

## 2017-06-21 RX ADMIN — Medication 5 MILLIGRAM(S): at 14:37

## 2017-06-21 RX ADMIN — CEFEPIME 100 MILLIGRAM(S): 1 INJECTION, POWDER, FOR SOLUTION INTRAMUSCULAR; INTRAVENOUS at 18:23

## 2017-06-21 RX ADMIN — CEFEPIME 100 MILLIGRAM(S): 1 INJECTION, POWDER, FOR SOLUTION INTRAMUSCULAR; INTRAVENOUS at 05:49

## 2017-06-21 RX ADMIN — PANTOPRAZOLE SODIUM 40 MILLIGRAM(S): 20 TABLET, DELAYED RELEASE ORAL at 08:20

## 2017-06-21 RX ADMIN — Medication 6: at 17:34

## 2017-06-21 RX ADMIN — LACTULOSE 30 GRAM(S): 10 SOLUTION ORAL at 12:34

## 2017-06-21 RX ADMIN — Medication 5 UNIT(S): at 17:33

## 2017-06-21 RX ADMIN — SODIUM CHLORIDE 75 MILLILITER(S): 9 INJECTION, SOLUTION INTRAVENOUS at 14:37

## 2017-06-21 RX ADMIN — HEPARIN SODIUM 5000 UNIT(S): 5000 INJECTION INTRAVENOUS; SUBCUTANEOUS at 05:49

## 2017-06-21 RX ADMIN — Medication 6: at 22:32

## 2017-06-21 RX ADMIN — HEPARIN SODIUM 5000 UNIT(S): 5000 INJECTION INTRAVENOUS; SUBCUTANEOUS at 18:23

## 2017-06-21 RX ADMIN — AZITHROMYCIN 500 MILLIGRAM(S): 500 TABLET, FILM COATED ORAL at 14:37

## 2017-06-21 NOTE — PROGRESS NOTE ADULT - SUBJECTIVE AND OBJECTIVE BOX
Patient is a 67y old  Male who presents with a chief complaint of Dyspnea and cough (2017 19:25)      BRIEF HOSPITAL COURSE: 67 yom  lung CA,     Events last 24 hours: ***    PAST MEDICAL & SURGICAL HISTORY:  Non-small cell lung cancer, unspecified laterality  Hepatitis C: diagnosed a few years ago as per pt no treatment. iv drug use as teenager  Cough  Lung nodule  High cholesterol  Hypertension  Diabetes  DM (diabetes mellitus)  HTN (hypertension)  Portacath in place: 2016  No significant past surgical history      Review of Systems:  CONSTITUTIONAL: No fever, chills, or fatigue  EYES: No eye pain, visual disturbances, or discharge  ENMT:  No difficulty hearing, tinnitus, vertigo; No sinus or throat pain  NECK: No pain or stiffness  RESPIRATORY: No cough, wheezing, chills or hemoptysis; No shortness of breath  CARDIOVASCULAR: No chest pain, palpitations, dizziness, or leg swelling  GASTROINTESTINAL: No abdominal or epigastric pain. No nausea, vomiting, or hematemesis; No diarrhea or constipation. No melena or hematochezia.  GENITOURINARY: No dysuria, frequency, hematuria, or incontinence  NEUROLOGICAL: No headaches, memory loss, loss of strength, numbness, or tremors  SKIN: No itching, burning, rashes, or lesions   MUSCULOSKELETAL: No joint pain or swelling; No muscle, back, or extremity pain  PSYCHIATRIC: No depression, anxiety, mood swings, or difficulty sleeping      Medications:  cefepime  IVPB 1000milliGRAM(s) IV Intermittent every 12 hours  cefepime  IVPB     azithromycin   Tablet 500milliGRAM(s) Oral daily        morphine  - Injectable 2milliGRAM(s) IV Push every 4 hours PRN  oxyCODONE  5 mG/acetaminophen 325 mG 1Tablet(s) Oral every 6 hours PRN  oxyCODONE  5 mG/acetaminophen 325 mG 2Tablet(s) Oral every 6 hours PRN  metoclopramide Injectable 5milliGRAM(s) IV Push once      heparin  Injectable 5000Unit(s) SubCutaneous every 12 hours    pantoprazole    Tablet 40milliGRAM(s) Oral before breakfast      dextrose 50% Injectable 50milliLiter(s) IV Push every 15 minutes  dextrose 50% Injectable 25milliLiter(s) IV Push every 15 minutes  insulin glargine Injectable (LANTUS) 40Unit(s) SubCutaneous every morning  insulin lispro (HumaLOG) corrective regimen sliding scale  SubCutaneous three times a day before meals  insulin lispro Injectable (HumaLOG) 5Unit(s) SubCutaneous three times a day with meals    multiple electrolytes Injection Type 1 1000milliLiter(s) IV Continuous <Continuous>                ICU Vital Signs Last 24 Hrs  T(C): 36.4, Max: 36.9 (-20 @ 23:00)  T(F): 97.6, Max: 98.5 (-20 @ 23:00)  HR: 94 (85 - 109)  BP: 103/61 (96/56 - 119/62)  BP(mean): 78 (70 - 87)  ABP: --  ABP(mean): --  RR: 16 (13 - 32)  SpO2: 88% (88% - 95%)      ABG - ( 2017 17:39 )  pH: 7.34  /  pCO2: 33    /  pO2: 91    / HCO3: 18    / Base Excess: -7.5  /  SaO2: 96                  I&O's Detail  I & Os for 24h ending 2017 07:00  =============================================  IN:    Solution: 500 ml    Solution: 250 ml    Oral Fluid: 170 ml    insulin Infusion: 130 ml    Solution: 100 ml    Total IN: 1150 ml  ---------------------------------------------  OUT:    Voided: 1025 ml    Total OUT: 1025 ml  ---------------------------------------------  Total NET: 125 ml    I & Os for current day (as of 2017 14:31)  =============================================  IN:    Oral Fluid: 300 ml    insulin Infusion: 19 ml    Total IN: 319 ml  ---------------------------------------------  OUT:    Total OUT: 0 ml  ---------------------------------------------  Total NET: 319 ml        LABS:                        12.8   22.6  )-----------( 175      ( 2017 06:47 )             38.5     -    135  |  96<L>  |  73.0<H>  ----------------------------<  126<H>  4.3   |  18.0<L>  |  2.67<H>    Ca    8.9      2017 06:47  Phos  6.6       Mg     2.1         TPro  9.2<H>  /  Alb  3.5  /  TBili  0.6  /  DBili  x   /  AST  10  /  ALT  14  /  AlkPhos  82  06-19      CARDIAC MARKERS ( 2017 06:12 )  x     / <0.01 ng/mL / x     / x     / x          CAPILLARY BLOOD GLUCOSE  129 (2017 12:00)    PT/INR - ( 2017 17:15 )   PT: 15.7 sec;   INR: 1.42 ratio         PTT - ( 2017 17:15 )  PTT:26.9 sec  Urinalysis Basic - ( 2017 19:50 )    Color: Yellow / Appearance: Clear / S.020 / pH: x  Gluc: x / Ketone: Negative  / Bili: Negative / Urobili: Negative mg/dL   Blood: x / Protein: 15 mg/dL / Nitrite: Negative   Leuk Esterase: Negative / RBC: 0-2 /HPF / WBC 3-5   Sq Epi: x / Non Sq Epi: Few / Bacteria: Few      CULTURES:      Physical Examination:    General: No acute distress.  Alert, oriented, interactive, nonfocal    HEENT: Pupils equal, reactive to light.  Symmetric.    PULM: Clear to auscultation bilaterally, no significant sputum production    CVS: Regular rate and rhythm, no murmurs, rubs, or gallops    ABD: Soft, nondistended, nontender, normoactive bowel sounds, no masses    EXT: No edema, nontender    SKIN: Warm and well perfused, no rashes noted.    RADIOLOGY: ***    CRITICAL CARE TIME SPENT: *** Patient is a 67y old  Male who presents with a chief complaint of Dyspnea and cough (2017 19:25)      BRIEF HOSPITAL COURSE: 67 yom  lung CA, pneumonia, dm, abdominal pain    Events last 24 hours: still having abdominal distension and pain, passing gas, breathing better    PAST MEDICAL & SURGICAL HISTORY:  Non-small cell lung cancer, unspecified laterality  Hepatitis C: diagnosed a few years ago as per pt no treatment. iv drug use as teenager  Cough  Lung nodule  High cholesterol  Hypertension  Diabetes  DM (diabetes mellitus)  HTN (hypertension)  Portacath in place:   No significant past surgical history      Review of Systems:  CONSTITUTIONAL: No fever, chills, or fatigue  EYES: No eye pain, visual disturbances, or discharge  ENMT:  No difficulty hearing, tinnitus, vertigo; No sinus or throat pain  NECK: No pain or stiffness  RESPIRATORY: No cough, wheezing, chills or hemoptysis; No shortness of breath  CARDIOVASCULAR: No chest pain, palpitations, dizziness, or leg swelling  GASTROINTESTINAL: + distension abdominal or epigastric pain. No nausea, vomiting, or hematemesis; No diarrhea or constipation. No melena or hematochezia.  GENITOURINARY: No dysuria, frequency, hematuria, or incontinence  NEUROLOGICAL: No headaches, memory loss, loss of strength, numbness, or tremors  SKIN: No itching, burning, rashes, or lesions   MUSCULOSKELETAL: No joint pain or swelling; No muscle, back, or extremity pain  PSYCHIATRIC: No depression, anxiety, mood swings, or difficulty sleeping      Medications:  cefepime  IVPB 1000milliGRAM(s) IV Intermittent every 12 hours  cefepime  IVPB     azithromycin   Tablet 500milliGRAM(s) Oral daily        morphine  - Injectable 2milliGRAM(s) IV Push every 4 hours PRN  oxyCODONE  5 mG/acetaminophen 325 mG 1Tablet(s) Oral every 6 hours PRN  oxyCODONE  5 mG/acetaminophen 325 mG 2Tablet(s) Oral every 6 hours PRN  metoclopramide Injectable 5milliGRAM(s) IV Push once      heparin  Injectable 5000Unit(s) SubCutaneous every 12 hours    pantoprazole    Tablet 40milliGRAM(s) Oral before breakfast      dextrose 50% Injectable 50milliLiter(s) IV Push every 15 minutes  dextrose 50% Injectable 25milliLiter(s) IV Push every 15 minutes  insulin glargine Injectable (LANTUS) 40Unit(s) SubCutaneous every morning  insulin lispro (HumaLOG) corrective regimen sliding scale  SubCutaneous three times a day before meals  insulin lispro Injectable (HumaLOG) 5Unit(s) SubCutaneous three times a day with meals    multiple electrolytes Injection Type 1 1000milliLiter(s) IV Continuous <Continuous>                ICU Vital Signs Last 24 Hrs  T(C): 36.4, Max: 36.9 (-20 @ 23:00)  T(F): 97.6, Max: 98.5 (20 @ 23:00)  HR: 94 (85 - 109)  BP: 103/61 (96/56 - 119/62)  BP(mean): 78 (70 - 87)  ABP: --  ABP(mean): --  RR: 16 (13 - 32)  SpO2: 88% (88% - 95%)      ABG - ( 2017 17:39 )  pH: 7.34  /  pCO2: 33    /  pO2: 91    / HCO3: 18    / Base Excess: -7.5  /  SaO2: 96                  I&O's Detail  I & Os for 24h ending 2017 07:00  =============================================  IN:    Solution: 500 ml    Solution: 250 ml    Oral Fluid: 170 ml    insulin Infusion: 130 ml    Solution: 100 ml    Total IN: 1150 ml  ---------------------------------------------  OUT:    Voided: 1025 ml    Total OUT: 1025 ml  ---------------------------------------------  Total NET: 125 ml    I & Os for current day (as of 2017 14:31)  =============================================  IN:    Oral Fluid: 300 ml    insulin Infusion: 19 ml    Total IN: 319 ml  ---------------------------------------------  OUT:    Total OUT: 0 ml  ---------------------------------------------  Total NET: 319 ml        LABS:                        12.8   22.6  )-----------( 175      ( 2017 06:47 )             38.5     06-21    135  |  96<L>  |  73.0<H>  ----------------------------<  126<H>  4.3   |  18.0<L>  |  2.67<H>    Ca    8.9      2017 06:47  Phos  6.6     -  Mg     2.1         TPro  9.2<H>  /  Alb  3.5  /  TBili  0.6  /  DBili  x   /  AST  10  /  ALT  14  /  AlkPhos  82  06-19      CARDIAC MARKERS ( 2017 06:12 )  x     / <0.01 ng/mL / x     / x     / x          CAPILLARY BLOOD GLUCOSE  129 (2017 12:00)    PT/INR - ( 2017 17:15 )   PT: 15.7 sec;   INR: 1.42 ratio         PTT - ( 2017 17:15 )  PTT:26.9 sec  Urinalysis Basic - ( 2017 19:50 )    Color: Yellow / Appearance: Clear / S.020 / pH: x  Gluc: x / Ketone: Negative  / Bili: Negative / Urobili: Negative mg/dL   Blood: x / Protein: 15 mg/dL / Nitrite: Negative   Leuk Esterase: Negative / RBC: 0-2 /HPF / WBC 3-5   Sq Epi: x / Non Sq Epi: Few / Bacteria: Few      CULTURES:      Physical Examination:    General: No acute distress.  Alert, oriented, interactive, nonfocal    HEENT: Pupils equal, reactive to light.  Symmetric.    PULM: Clear to auscultation bilaterally, no significant sputum production    CVS: Regular rate and rhythm, no murmurs, rubs, or gallops    ABD: +distended, less tender than yesterday, normoactive bowel sounds, no masses    EXT: + edema, nontender    SKIN: Warm and well perfused, no rashes noted. Patient is a 67y old  Male who presents with a chief complaint of Dyspnea and cough (2017 19:25)      BRIEF HOSPITAL COURSE: 67 yom  lung CA, pneumonia, dm, abdominal pain    Events last 24 hours: still having abdominal distension and pain, passing gas, breathing better    PAST MEDICAL & SURGICAL HISTORY:  Non-small cell lung cancer, unspecified laterality  Hepatitis C: diagnosed a few years ago as per pt no treatment. iv drug use as teenager  Cough  Lung nodule  High cholesterol  Hypertension  Diabetes  DM (diabetes mellitus)  HTN (hypertension)  Portacath in place:   No significant past surgical history      Review of Systems:  CONSTITUTIONAL: No fever, chills, or fatigue  EYES: No eye pain, visual disturbances, or discharge  ENMT:  No difficulty hearing, tinnitus, vertigo; No sinus or throat pain  NECK: No pain or stiffness  RESPIRATORY: No cough, wheezing, chills or hemoptysis; No shortness of breath  CARDIOVASCULAR: No chest pain, palpitations, dizziness, or leg swelling  GASTROINTESTINAL: + distension abdominal or epigastric pain. No nausea, vomiting, or hematemesis; No diarrhea or constipation. No melena or hematochezia.  GENITOURINARY: No dysuria, frequency, hematuria, or incontinence  NEUROLOGICAL: No headaches, memory loss, loss of strength, numbness, or tremors  SKIN: No itching, burning, rashes, or lesions   MUSCULOSKELETAL: No joint pain or swelling; No muscle, back, or extremity pain  PSYCHIATRIC: No depression, anxiety, mood swings, or difficulty sleeping      Medications:  cefepime  IVPB 1000milliGRAM(s) IV Intermittent every 12 hours  cefepime  IVPB     azithromycin   Tablet 500milliGRAM(s) Oral daily        morphine  - Injectable 2milliGRAM(s) IV Push every 4 hours PRN  oxyCODONE  5 mG/acetaminophen 325 mG 1Tablet(s) Oral every 6 hours PRN  oxyCODONE  5 mG/acetaminophen 325 mG 2Tablet(s) Oral every 6 hours PRN  metoclopramide Injectable 5milliGRAM(s) IV Push once      heparin  Injectable 5000Unit(s) SubCutaneous every 12 hours    pantoprazole    Tablet 40milliGRAM(s) Oral before breakfast      dextrose 50% Injectable 50milliLiter(s) IV Push every 15 minutes  dextrose 50% Injectable 25milliLiter(s) IV Push every 15 minutes  insulin glargine Injectable (LANTUS) 40Unit(s) SubCutaneous every morning  insulin lispro (HumaLOG) corrective regimen sliding scale  SubCutaneous three times a day before meals  insulin lispro Injectable (HumaLOG) 5Unit(s) SubCutaneous three times a day with meals    multiple electrolytes Injection Type 1 1000milliLiter(s) IV Continuous <Continuous>                ICU Vital Signs Last 24 Hrs  T(C): 36.4, Max: 36.9 (-20 @ 23:00)  T(F): 97.6, Max: 98.5 (20 @ 23:00)  HR: 94 (85 - 109)  BP: 103/61 (96/56 - 119/62)  BP(mean): 78 (70 - 87)  ABP: --  ABP(mean): --  RR: 16 (13 - 32)  SpO2: 88% (88% - 95%)      ABG - ( 2017 17:39 )  pH: 7.34  /  pCO2: 33    /  pO2: 91    / HCO3: 18    / Base Excess: -7.5  /  SaO2: 96                  I&O's Detail  I & Os for 24h ending 2017 07:00  =============================================  IN:    Solution: 500 ml    Solution: 250 ml    Oral Fluid: 170 ml    insulin Infusion: 130 ml    Solution: 100 ml    Total IN: 1150 ml  ---------------------------------------------  OUT:    Voided: 1025 ml    Total OUT: 1025 ml  ---------------------------------------------  Total NET: 125 ml    I & Os for current day (as of 2017 14:31)  =============================================  IN:    Oral Fluid: 300 ml    insulin Infusion: 19 ml    Total IN: 319 ml  ---------------------------------------------  OUT:    Total OUT: 0 ml  ---------------------------------------------  Total NET: 319 ml        LABS:                        12.8   22.6  )-----------( 175      ( 2017 06:47 )             38.5     06-21    135  |  96<L>  |  73.0<H>  ----------------------------<  126<H>  4.3   |  18.0<L>  |  2.67<H>    Ca    8.9      2017 06:47  Phos  6.6     -  Mg     2.1         TPro  9.2<H>  /  Alb  3.5  /  TBili  0.6  /  DBili  x   /  AST  10  /  ALT  14  /  AlkPhos  82  06-19      CARDIAC MARKERS ( 2017 06:12 )  x     / <0.01 ng/mL / x     / x     / x          CAPILLARY BLOOD GLUCOSE  129 (2017 12:00)    PT/INR - ( 2017 17:15 )   PT: 15.7 sec;   INR: 1.42 ratio         PTT - ( 2017 17:15 )  PTT:26.9 sec  Urinalysis Basic - ( 2017 19:50 )    Color: Yellow / Appearance: Clear / S.020 / pH: x  Gluc: x / Ketone: Negative  / Bili: Negative / Urobili: Negative mg/dL   Blood: x / Protein: 15 mg/dL / Nitrite: Negative   Leuk Esterase: Negative / RBC: 0-2 /HPF / WBC 3-5   Sq Epi: x / Non Sq Epi: Few / Bacteria: Few      CULTURES:      Physical Examination:    General: No acute distress.  Alert, oriented, interactive, nonfocal    HEENT: Pupils equal, reactive to light.  Symmetric.    PULM: Course and diminshed    CVS: Regular rate and rhythm, no murmurs, rubs, or gallops    ABD: +distended, less tender than yesterday, normoactive bowel sounds, no masses    EXT: + edema, nontender    SKIN: Warm and well perfused, no rashes noted.

## 2017-06-21 NOTE — PROGRESS NOTE ADULT - SUBJECTIVE AND OBJECTIVE BOX
Renal :    66 YO- W.carcinoma lung, S/P Chemotherapy,     Pneumonia - on Antibiotics,    Now w. TEO,    Full consult to follow,

## 2017-06-21 NOTE — CONSULT NOTE ADULT - SUBJECTIVE AND OBJECTIVE BOX
Renal :  General:  ·  Admission Date: 21-Mar-2017.   ·  Arrived From home .  ·  Source of Information patient.    Other Care Providers:  · Primary Care Provider :	Dr. Lorenzo [phone]	    Chief Complaint/Reason for Visit/HPI:     	"Lung cancer"	    History of Present Illness:  		        66 year old black male who is scheduled for an  [id number] removal and  placement of a new one to continue chemo/immune therapy  for lung cancer, he said the present port is not functional     Allergies/Medications:     	No Known Allergies:     Home Medications:   * Incomplete Medication History as of 21-Mar-2017 16:10 documented in Prescription Writer  · 	insulin glargine 100 units/mL subcutaneous solution: Last Dose Taken:  , 46 unit(s) subcutaneous once a day (at bedtime)  · 	losartan 100 mg oral tablet: Last Dose Taken:  , 1 tab(s) orally once a day  · 	amLODIPine 10 mg oral tablet: Last Dose Taken:  , 1 tab(s) orally once a day  · 	NovoLOG 100 units/mL subcutaneous solution: 5 unit(s) subcutaneous 3 times a day (before meals)  · 	simvastatin 20 mg oral tablet: 1 tab(s) orally once a day (at bedtime)  · 	metoprolol succinate 50 mg oral tablet, extended release: 1 tab(s) orally once a day  · 	glimepiride 4 mg oral tablet: Last Dose Taken:  , 1 tab(s) orally once a day  · 	Lantus 100 units/mL subcutaneous solution: Last Dose Taken:  , 28 unit(s) subcutaneous once a day  · 	omeprazole 20 mg oral delayed release capsule: Last Dose Taken:  , 1 cap(s) orally once a day    PMH/PSH/FH/SH:     DM (diabetes mellitus)    Hepatitis C  diagnosed a few years ago as per pt no treatment. iv drug use as teenager  High cholesterol    HTN (hypertension)    Hypertension    Lung nodule.    Past Surgical History:  Portacath in place  2016.    Family History:  Mother  Still living? No  Maternal family history of cancer, Age at diagnosis: Age Unknown.    Social History:  · Marital Status		  · Occupation	construction	  · Lives With	alone	    Alcohol Use History:  · Have you ever consumed alcohol	never	    Tobacco Usage:  · Tobacco Usage: Former smoker	  · Tobacco Type: cigarettes	  · Number of Packs per Day: 0.5	  · Number of yrs: 40	  · Pack yrs: 20	  · Longest Period Tobacco-Free: [place] 15 th 2017	  · Cessation Medication Offered: refused	          Hepatitis C Status:  · Hepatitis C Status	Positive	      Devices:  · Implants/Medical Devices	None; Vascular access device; ann cath	        Review of Systems:   · General	negative	  · Respiratory and Thorax Symptoms	cough  sputum production	  · Respiratory and Thorax Comments	occasional cough with clear sputum	  · Cardiovascular Symptoms	dyspnea on exertion	  · Gastrointestinal	negative	  · Genitourinary	negative	  · Musculoskeletal	negative	  · Neurological Comments	numbness to hand and feet	  · Psychiatric	negative	  · Hematology/Lymphatics	negative	  · Endocrine	negative	  · Allergic/Immunologic	negative	      Height/Weight/BSA/BMI:  · Height (FEET)	6 Feet	  · Height (INCHES)	3 Inch(s)	  · Height (CENTIMETERS)	190.5 Centimeter(s)	  · Dosing Weight (KILOGRAMS)	130.8 kg	  · Dosing Weight  (POUNDS)	288.3 Pound(s)	  · BSA (m2)	2.56 Meter Squared	  · BMI (kG/m2)	36	    T/HR/RR/BP/SPO2:  · Temp (F)	99.1 Degrees F	  · Temp (C)	37.3 Degrees C	  · Heart Rate	70 /min	  · Respiration Rate (breaths/min)	16 /min	  · BP Systolic	152 mm Hg	  · BP Diastolic	87 mm Hg	  · BP Noninvasive Mean	108 mm Hg	    Physical Exam:  · Constitutional	Well-developed, well nourished	  · Eyes	EOMI; PERRL; 	  · ENMT	No oral lesions; 	  · Neck	No bruits; no thyromegaly 	  		  		  · Back	No deformity or limitation of movement	  · Respiratory	Breath Sounds equal & clear to percussion & auscultation, no accessory muscle use	  · Cardiovascular	Regular rate & rhythm, normal S1, S2; no murmurs, gallops or rubs; no S3, S4	  · Gastrointestinal	Soft, non-tender, no hepatosplenomegaly, normal bowel sounds	  · Genitourinary	patient refused	  · Rectal	patient refused	  · Extremities	No cyanosis, clubbing or edema	  · Vascular	Equal and normal pulses (carotid, femoral, dorsalis pedis)	  · Neurological	Alert & oriented; no sensory, motor or coordination deficits, normal reflexes	  · Skin	No lesions; no rash	  · Lymph Nodes	No lymphadedenopathy	  · Musculoskeletal	No joint pain, swelling or deformity; no limitation of movement	  · Psychiatric	Affect and characteristics of appearance, verbalizations, behaviors are appropriate	    Results:   Comments:  · EKG and Interpretation	EKG reviewed, no acute changes, official reading pending.	      Diagnosis:        PMH:  · 	Hepatitis C: Entered Date: 06-Nov-2015 13:00, Status: Active, Scope: General, Description: diagnosed a few years ago as per pt no treatment. iv drug use as teenager, Entered By: D'Amico, Karyn L, Last Modified By: D'Amico, Karyn L    · 	Lung nodule: Entered Date: 06-Nov-2015 12:59, Status: Active, Scope: General, Entered By: D'Amico, Karyn L, Last Modified By: D'Amico, Karyn L  · 	High cholesterol: Entered Date: 09-May-2015 06:37, Status: Active, Scope: General, Entered By: Jeison Mckay, Last Modified By: Yair, RTIF  · 	Hypertension: Entered Date: 09-May-2015 06:37, Status: Active, Scope: General, Entered By: Jeison Mckay, Last Modified By: Interfaces, RTIF  · 	Diabetes: Entered Date: 09-May-2015 06:37, Status: Active, Scope: General, Entered By: Jeison Mckay, Last Modified By: Interfaces, RTIF  · 	DM (diabetes mellitus): Entered Date: 06-May-2015 09:04, Status: Active, Scope: General, Entered By: Marie Rao, Last Modified By: Yair, RTIF  · 	HTN (hypertension): Entered Date: 06-May-2015 09:04, Status: Active, Scope: General, Entered By: Marie Rao, Last Modified By: Yair, RTIF       PSH:  · 	No significant past surgical history: Entered Date: 09-May-2015 06:37, Status: Active, Scope: General, Entered By: Jeison Mckay, Last Modified By: Yair RTIF    Assessment and Plan:     		  Problem/Plan - 1:  ·  Problem: Disorder of lung.  S/P port removal and placement of ann cath.     Problem/Plan - 2:  ·  Problem: Hypertension.  Plan: continue medications ,    Problem/Plan - 3:  ·  Problem: DM (diabetes mellitus). Poorly controlled,

## 2017-06-22 DIAGNOSIS — R00.0 TACHYCARDIA, UNSPECIFIED: ICD-10-CM

## 2017-06-22 DIAGNOSIS — J96.21 ACUTE AND CHRONIC RESPIRATORY FAILURE WITH HYPOXIA: ICD-10-CM

## 2017-06-22 DIAGNOSIS — N17.9 ACUTE KIDNEY FAILURE, UNSPECIFIED: ICD-10-CM

## 2017-06-22 DIAGNOSIS — E11.9 TYPE 2 DIABETES MELLITUS WITHOUT COMPLICATIONS: ICD-10-CM

## 2017-06-22 DIAGNOSIS — R10.84 GENERALIZED ABDOMINAL PAIN: ICD-10-CM

## 2017-06-22 DIAGNOSIS — R06.02 SHORTNESS OF BREATH: ICD-10-CM

## 2017-06-22 LAB
ANION GAP SERPL CALC-SCNC: 18 MMOL/L — HIGH (ref 5–17)
BUN SERPL-MCNC: 82 MG/DL — HIGH (ref 8–20)
CALCIUM SERPL-MCNC: 8.8 MG/DL — SIGNIFICANT CHANGE UP (ref 8.6–10.2)
CHLORIDE SERPL-SCNC: 97 MMOL/L — LOW (ref 98–107)
CO2 SERPL-SCNC: 18 MMOL/L — LOW (ref 22–29)
CREAT SERPL-MCNC: 2.11 MG/DL — HIGH (ref 0.5–1.3)
GLUCOSE SERPL-MCNC: 260 MG/DL — HIGH (ref 70–115)
HCT VFR BLD CALC: 36.4 % — LOW (ref 42–52)
HGB BLD-MCNC: 12.3 G/DL — LOW (ref 14–18)
MAGNESIUM SERPL-MCNC: 2.4 MG/DL — SIGNIFICANT CHANGE UP (ref 1.6–2.6)
MCHC RBC-ENTMCNC: 25.4 PG — LOW (ref 27–31)
MCHC RBC-ENTMCNC: 33.8 G/DL — SIGNIFICANT CHANGE UP (ref 32–36)
MCV RBC AUTO: 75.2 FL — LOW (ref 80–94)
PHOSPHATE SERPL-MCNC: 6.5 MG/DL — HIGH (ref 2.4–4.7)
PLATELET # BLD AUTO: 177 K/UL — SIGNIFICANT CHANGE UP (ref 150–400)
POTASSIUM SERPL-MCNC: 4.7 MMOL/L — SIGNIFICANT CHANGE UP (ref 3.5–5.3)
POTASSIUM SERPL-SCNC: 4.7 MMOL/L — SIGNIFICANT CHANGE UP (ref 3.5–5.3)
RBC # BLD: 4.84 M/UL — SIGNIFICANT CHANGE UP (ref 4.6–6.2)
RBC # FLD: 15.9 % — HIGH (ref 11–15.6)
SODIUM SERPL-SCNC: 133 MMOL/L — LOW (ref 135–145)
WBC # BLD: 20.2 K/UL — HIGH (ref 4.8–10.8)
WBC # FLD AUTO: 20.2 K/UL — HIGH (ref 4.8–10.8)

## 2017-06-22 PROCEDURE — 99221 1ST HOSP IP/OBS SF/LOW 40: CPT

## 2017-06-22 PROCEDURE — 99291 CRITICAL CARE FIRST HOUR: CPT

## 2017-06-22 PROCEDURE — 99233 SBSQ HOSP IP/OBS HIGH 50: CPT

## 2017-06-22 PROCEDURE — 99223 1ST HOSP IP/OBS HIGH 75: CPT

## 2017-06-22 RX ORDER — INSULIN LISPRO 100/ML
8 VIAL (ML) SUBCUTANEOUS
Qty: 0 | Refills: 0 | Status: DISCONTINUED | OUTPATIENT
Start: 2017-06-22 | End: 2017-06-22

## 2017-06-22 RX ORDER — METOPROLOL TARTRATE 50 MG
50 TABLET ORAL DAILY
Qty: 0 | Refills: 0 | Status: DISCONTINUED | OUTPATIENT
Start: 2017-06-22 | End: 2017-06-20

## 2017-06-22 RX ORDER — METOPROLOL TARTRATE 50 MG
5 TABLET ORAL ONCE
Qty: 0 | Refills: 0 | Status: COMPLETED | OUTPATIENT
Start: 2017-06-22 | End: 2017-06-22

## 2017-06-22 RX ORDER — SODIUM CHLORIDE 9 MG/ML
1000 INJECTION, SOLUTION INTRAVENOUS
Qty: 0 | Refills: 0 | Status: DISCONTINUED | OUTPATIENT
Start: 2017-06-22 | End: 2017-06-23

## 2017-06-22 RX ORDER — METOCLOPRAMIDE HCL 10 MG
10 TABLET ORAL EVERY 8 HOURS
Qty: 0 | Refills: 0 | Status: DISCONTINUED | OUTPATIENT
Start: 2017-06-22 | End: 2017-06-20

## 2017-06-22 RX ORDER — INSULIN HUMAN 100 [IU]/ML
4 INJECTION, SOLUTION SUBCUTANEOUS
Qty: 250 | Refills: 0 | Status: DISCONTINUED | OUTPATIENT
Start: 2017-06-22 | End: 2017-06-23

## 2017-06-22 RX ADMIN — SODIUM CHLORIDE 80 MILLILITER(S): 9 INJECTION, SOLUTION INTRAVENOUS at 17:37

## 2017-06-22 RX ADMIN — Medication 50 MILLIGRAM(S): at 22:24

## 2017-06-22 RX ADMIN — HEPARIN SODIUM 5000 UNIT(S): 5000 INJECTION INTRAVENOUS; SUBCUTANEOUS at 18:01

## 2017-06-22 RX ADMIN — CEFEPIME 100 MILLIGRAM(S): 1 INJECTION, POWDER, FOR SOLUTION INTRAMUSCULAR; INTRAVENOUS at 18:01

## 2017-06-22 RX ADMIN — Medication 8 UNIT(S): at 07:59

## 2017-06-22 RX ADMIN — SODIUM CHLORIDE 75 MILLILITER(S): 9 INJECTION, SOLUTION INTRAVENOUS at 04:46

## 2017-06-22 RX ADMIN — PANTOPRAZOLE SODIUM 40 MILLIGRAM(S): 20 TABLET, DELAYED RELEASE ORAL at 06:08

## 2017-06-22 RX ADMIN — Medication 10 MILLIGRAM(S): at 15:18

## 2017-06-22 RX ADMIN — CEFEPIME 100 MILLIGRAM(S): 1 INJECTION, POWDER, FOR SOLUTION INTRAMUSCULAR; INTRAVENOUS at 05:02

## 2017-06-22 RX ADMIN — HEPARIN SODIUM 5000 UNIT(S): 5000 INJECTION INTRAVENOUS; SUBCUTANEOUS at 05:03

## 2017-06-22 RX ADMIN — Medication 6: at 07:59

## 2017-06-22 RX ADMIN — AZITHROMYCIN 500 MILLIGRAM(S): 500 TABLET, FILM COATED ORAL at 11:18

## 2017-06-22 RX ADMIN — Medication 10 MILLIGRAM(S): at 11:18

## 2017-06-22 RX ADMIN — Medication 10 MILLIGRAM(S): at 17:10

## 2017-06-22 RX ADMIN — INSULIN GLARGINE 40 UNIT(S): 100 INJECTION, SOLUTION SUBCUTANEOUS at 07:58

## 2017-06-22 RX ADMIN — Medication 5 MILLIGRAM(S): at 20:08

## 2017-06-22 RX ADMIN — Medication 10 MILLIGRAM(S): at 21:14

## 2017-06-22 RX ADMIN — INSULIN HUMAN 4 UNIT(S)/HR: 100 INJECTION, SOLUTION SUBCUTANEOUS at 11:45

## 2017-06-22 NOTE — PROGRESS NOTE ADULT - SUBJECTIVE AND OBJECTIVE BOX
Patient is a 67y old  Male who presents with a chief complaint of Dyspnea and cough (19 Jun 2017 19:25)      BRIEF HOSPITAL COURSE:   67M w/ PMH of lung CA on Po chemo, non-compliant diabetic, hep C,and remote Hx of IVDA. admitted with respiratory distress, TEO, and hyperglycemia after complaining of abd pain while at chemo. Abd/Chest Ct done showed LLL PNA.  Placed on antbiotics for possible pleural infection, insulin drip, and BiPAP.     Events last 24 hours:   -patient started on insulin drip today for uncontrolled hyperglycemia  -home lopressor re-started for tachycardia  -remains on nocturnal BiPAP    PAST MEDICAL & SURGICAL HISTORY:  Non-small cell lung cancer, unspecified laterality  Hepatitis C: diagnosed a few years ago as per pt no treatment. iv drug use as teenager  Cough  Lung nodule  High cholesterol  Hypertension  Diabetes  DM (diabetes mellitus)  HTN (hypertension)  Portacath in place: 2016  No significant past surgical history      Review of Systems:  CONSTITUTIONAL: No fever, chills, or fatigue  RESPIRATORY: (+)cough, no  wheezing, chills or hemoptysis; No shortness of breath  CARDIOVASCULAR: No chest pain, palpitations, dizziness, or leg swelling  GASTROINTESTINAL: No abdominal or epigastric pain. No nausea, vomiting, or hematemesis; No diarrhea or constipation. No melena or hematochezia.      Medications:  cefepime  IVPB 1000milliGRAM(s) IV Intermittent every 12 hours  cefepime  IVPB     azithromycin   Tablet 500milliGRAM(s) Oral daily    metoprolol succinate ER 50milliGRAM(s) Oral daily      morphine  - Injectable 2milliGRAM(s) IV Push every 4 hours PRN  oxyCODONE  5 mG/acetaminophen 325 mG 1Tablet(s) Oral every 6 hours PRN  oxyCODONE  5 mG/acetaminophen 325 mG 2Tablet(s) Oral every 6 hours PRN      heparin  Injectable 5000Unit(s) SubCutaneous every 12 hours    pantoprazole    Tablet 40milliGRAM(s) Oral before breakfast  metoclopramide 10milliGRAM(s) Oral every 8 hours  dicyclomine 10milliGRAM(s) Oral three times a day before meals      dextrose 50% Injectable 50milliLiter(s) IV Push every 15 minutes  dextrose 50% Injectable 25milliLiter(s) IV Push every 15 minutes  insulin glargine Injectable (LANTUS) 40Unit(s) SubCutaneous every morning  insulin Infusion 4Unit(s)/Hr IV Continuous <Continuous>    sodium chloride 0.45% 1000milliLiter(s) IV Continuous <Continuous>                ICU Vital Signs Last 24 Hrs  T(C): 36.9, Max: 37.6 (06-22 @ 04:00)  T(F): 98.5, Max: 99.6 (06-22 @ 04:00)  HR: 116 (95 - 116)  BP: 113/69 (102/60 - 136/70)  BP(mean): 86 (75 - 98)  ABP: --  ABP(mean): --  RR: 23 (15 - 29)  SpO2: 93% (88% - 98%)          I&O's Detail  I & Os for 24h ending 22 Jun 2017 07:00  =============================================  IN:    multiple electrolytes Injection Type 1multiple electrolytes Injection Type 1: 1275 ml    Oral Fluid: 700 ml    Solution: 100 ml    insulin Infusion: 19 ml    Total IN: 2094 ml  ---------------------------------------------  OUT:    Voided: 1130 ml    Total OUT: 1130 ml  ---------------------------------------------  Total NET: 964 ml    I & Os for current day (as of 22 Jun 2017 20:14)  =============================================  IN:    Oral Fluid: 1050 ml    multiple electrolytes Injection Type 1multiple electrolytes Injection Type 1: 675 ml    sodium chloride 0.45%: 400 ml    insulin Infusion: 55.5 ml    Solution: 50 ml    Total IN: 2230.5 ml  ---------------------------------------------  OUT:    Voided: 900 ml    Total OUT: 900 ml  ---------------------------------------------  Total NET: 1330.5 ml        LABS:                        12.3   20.2  )-----------( 177      ( 22 Jun 2017 05:56 )             36.4     06-22    133<L>  |  97<L>  |  82.0<H>  ----------------------------<  260<H>  4.7   |  18.0<L>  |  2.11<H>    Ca    8.8      22 Jun 2017 05:56  Phos  6.5     06-22  Mg     2.4     06-22        CARDIAC MARKERS ( 21 Jun 2017 15:02 )  x     / <0.01 ng/mL / x     / x     / x          CAPILLARY BLOOD GLUCOSE  208 (22 Jun 2017 20:00)        CULTURES:  Culture Results:   Moderate Staphylococcus aureus Identification and susceptibility to  follow.  Culture in progress (06-21 @ 14:11)  Culture Results:   No growth (06-20 @ 19:49)      Physical Examination:    General: No acute distress.  Alert, oriented, interactive, nonfocal. Bed in chair position    HEENT: Pupils equal, reactive to light.  Symmetric.    PULM: Rhonci on left side, diminished at bases bilaterally , no significant sputum production    CVS: Tachy,. regular rhythm, no murmurs, rubs, or gallops    ABD: Soft, nondistended, nontender, normoactive bowel sounds, no masses          CRITICAL CARE TIME SPENT: 40 minutes spent evaluating/treating patient, reviewing labs, and discussing care with bedside team.

## 2017-06-22 NOTE — CONSULT NOTE ADULT - PROBLEM SELECTOR RECOMMENDATION 9
IV Hydration - Discussed heavenly Rios,
-on optidivo for about 2 years per patient.   -had been following with Dr. Ramsay/[facility].

## 2017-06-22 NOTE — PROGRESS NOTE ADULT - PROBLEM SELECTOR PLAN 4
HR noted to be 120 on eval  -on review of home meds, patient was on Metoprolol 50 mg ER daily  -will re-start, also will give one dose of IVP lopressor  -will CTM

## 2017-06-22 NOTE — CONSULT NOTE ADULT - SUBJECTIVE AND OBJECTIVE BOX
History of Present Illness:  67y Male with h/o stage IV lung ca presents with SOB, abd distention and pain.  CT small loc Lt effusion.     Past Medical History  Non-small cell lung cancer, unspecified laterality stage IV dx in   Hepatitis C: diagnosed a few years ago as per pt no treatment. iv drug use as teenager  Cough  Lung nodule  High cholesterol  Hypertension  Diabetes  DM (diabetes mellitus)  HTN (hypertension)      Past Surgical History  Portacath in place:   No significant past surgical history      MEDICATIONS  (STANDING):  heparin  Injectable 5000Unit(s) SubCutaneous every 12 hours  dextrose 50% Injectable 50milliLiter(s) IV Push every 15 minutes  dextrose 50% Injectable 25milliLiter(s) IV Push every 15 minutes  cefepime  IVPB 1000milliGRAM(s) IV Intermittent every 12 hours  cefepime  IVPB     pantoprazole    Tablet 40milliGRAM(s) Oral before breakfast  insulin glargine Injectable (LANTUS) 40Unit(s) SubCutaneous every morning  azithromycin   Tablet 500milliGRAM(s) Oral daily  multiple electrolytes Injection Type 1 1000milliLiter(s) IV Continuous <Continuous>  insulin lispro (HumaLOG) corrective regimen sliding scale  SubCutaneous Before meals and at bedtime  insulin lispro Injectable (HumaLOG) 8Unit(s) SubCutaneous three times a day with meals  metoclopramide 10milliGRAM(s) Oral every 8 hours  dicyclomine 10milliGRAM(s) Oral three times a day before meals    MEDICATIONS  (PRN):  morphine  - Injectable 2milliGRAM(s) IV Push every 4 hours PRN Severe Pain (7 - 10)  oxyCODONE  5 mG/acetaminophen 325 mG 1Tablet(s) Oral every 6 hours PRN Mild Pain (1 - 3)  oxyCODONE  5 mG/acetaminophen 325 mG 2Tablet(s) Oral every 6 hours PRN Moderate Pain (4 - 6)    Antiplatelet therapy:                           Last dose/amt:    Allergies: No Known Allergies      SOCIAL HISTORY:  Smoker: [ ] Yes  [x ] No        PACK YEARS:                         WHEN QUIT?  ETOH use: [ ] Yes  [x ] No              FREQUENCY / QUANTITY:  Ilicit Drug use:  [ ] Yes  [x ] No  Occupation:  Live with:  Assist device use:    Relevant Family History  FAMILY HISTORY:  Maternal family history of cancer (Mother): pt not sure of type      Review of Systems neg except for HPI  GENERAL:  Fevers[] chills[] sweats[] fatigue[] weight loss[] weight gain []                                        NEURO:  parathesias[] seizures []  syncope []  confusion []                                                                                  EYES: glasses[]  blurry vision[]  discharge[] pain[] glaucoma []                                                                            ENMT:  difficulty hearing []  vertigo[]  dysphagia[] epistaxis[] recent dental work []                                      CV:  chest pain[] palpitations[] JIMENEZ [] diaphoresis [] edema[]                                                                                             RESPIRATORY:  wheezing[] SOB[] cough [] sputum[] hemoptysis[]                                                                    GI:  nausea[]  vomiting []  diarrhea[] constipation [] melena []                                                                        : hematuria[ ]  dysuria[ ] urgency[] incontinence[]                                                                                              MUSKULOSKELETAL:  arthritis[ ]  joint swelling [ ] muscle weakness [ ]                                                                  SKIN/BREAST:  rash[ ] itching [ ]  hair loss[ ] masses[ ]                                                                                                PSYCH:  dementia [ ] depresion [ ] anxiety[ ]                                                                                                                  HEME/LYMPH:  bruises easily[ ] enlarged lymph nodes[ ] tender lymph nodes[ ]                                                 ENDOCRINE:  cold intolerance[ ] heat intolerance[ ] polydipsia[ ]                                                                              PHYSICAL EXAM  Vital Signs Last 24 Hrs  T(C): 37, Max: 37.6 (- @ 04:00)  T(F): 98.6, Max: 99.6 (06- @ 04:00)  HR: 97 (85 - 116)  BP: 129/64 (93/57 - 140/66)  BP(mean): 89 (70 - 98)  RR: 16 (13 - 29)  SpO2: 94% (88% - 98%)    General: Well nourished, well developed, no acute distress.                                                         Neuro: Normal exam oriented to person/place & time with no focal motor or sensory  deficits.                    Eyes: Normal exam of conjunctiva & lids, pupils equally reactive.   ENT: Normal exam of nasal/oral mucosa with absence of cyanosis.   Neck: Normal exam of jugular veins, trachea & thyroid.   Chest: Normal lung exam with good air movement absence of wheezes, rales, or rhonchi:                                                                          CV:  Auscultation: normal [x ] S3[ ] S4[ ] Irregular [ ] Rub[ ] Clicks[ ]  Murmurs none:[x ]systolic [ ]  diastolic [ ] holosystolic [ ]  Carotids: No Bruits[ ] Other____________ Abdominal Aorta: normal [ ] nonpalpable[ ]                                                                         GI: abd distended and NTTP                                                                                             Extremities: Normal no evidence of cyanosis or deformity Edema: none[ ]trace[ ]1+[x ]2+[ ]3+[ ]4+[ ]  Lower Extremity Pulses: Right[ ] Left[ ]Varicosities[ ]  SKIN : Normal exam to inspection & palation.                                                           LABS:                        12.3   20.2  )-----------( 177      ( 2017 05:56 )             36.4     06-22    133<L>  |  97<L>  |  82.0<H>  ----------------------------<  260<H>  4.7   |  18.0<L>  |  2.11<H>    Ca    8.8      2017 05:56  Phos  6.5     -  Mg     2.4     -        Urinalysis Basic - ( 2017 19:50 )    Color: Yellow / Appearance: Clear / S.020 / pH: x  Gluc: x / Ketone: Negative  / Bili: Negative / Urobili: Negative mg/dL   Blood: x / Protein: 15 mg/dL / Nitrite: Negative   Leuk Esterase: Negative / RBC: 0-2 /HPF / WBC 3-5   Sq Epi: x / Non Sq Epi: Few / Bacteria: Few      CARDIAC MARKERS ( 2017 15:02 )  x     / <0.01 ng/mL / x     / x     / x              CXR:  CT Chest: small loc Lt effusion with ?pleural studding.  Abd CT normal        Assessment:  67y Male presents with Shortness of breath and distention.  No acute findings in chest.  Rec continue outpt tx once DC.  No plan for intervention for loc effusion      Plan:

## 2017-06-22 NOTE — CONSULT NOTE ADULT - SUBJECTIVE AND OBJECTIVE BOX
HPI:    PERTINENT PMH REVIEWED: Yes No    PAST MEDICAL & SURGICAL HISTORY:  Non-small cell lung cancer, unspecified laterality  Hepatitis C: diagnosed a few years ago as per pt no treatment. iv drug use as teenager  Cough  Lung nodule  High cholesterol  Hypertension  Diabetes  DM (diabetes mellitus)  HTN (hypertension)  Portacath in place:   No significant past surgical history    SOCIAL HISTORY:                                     Admitted from:  home    Surrogate     FAMILY HISTORY:  Maternal family history of cancer (Mother): pt not sure of type    Baseline ADLs (prior to admission):  Independent/ Dependent      Allergies    No Known Allergies    Intolerances        Present Symptoms:     Dyspnea: 0 1 2 3   Nausea/Vomiting: Yes No  Anxiety:  Yes No  Depression: Yes No  Fatigue: Yes No  Loss of appetite: Yes No    Pain:             Character-            Duration-            Effect-            Factors-            Frequency-            Location-            Severity-    Review of Systems: Reviewed                     Negative:                     Positive:  Unable to obtain due to poor mentation   All others negative    MEDICATIONS  (STANDING):  heparin  Injectable 5000Unit(s) SubCutaneous every 12 hours  dextrose 50% Injectable 50milliLiter(s) IV Push every 15 minutes  dextrose 50% Injectable 25milliLiter(s) IV Push every 15 minutes  cefepime  IVPB 1000milliGRAM(s) IV Intermittent every 12 hours  cefepime  IVPB     pantoprazole    Tablet 40milliGRAM(s) Oral before breakfast  insulin glargine Injectable (LANTUS) 40Unit(s) SubCutaneous every morning  azithromycin   Tablet 500milliGRAM(s) Oral daily  multiple electrolytes Injection Type 1 1000milliLiter(s) IV Continuous <Continuous>  metoclopramide 10milliGRAM(s) Oral every 8 hours  dicyclomine 10milliGRAM(s) Oral three times a day before meals  insulin Infusion 4Unit(s)/Hr IV Continuous <Continuous>    MEDICATIONS  (PRN):  morphine  - Injectable 2milliGRAM(s) IV Push every 4 hours PRN Severe Pain (7 - 10)  oxyCODONE  5 mG/acetaminophen 325 mG 1Tablet(s) Oral every 6 hours PRN Mild Pain (1 - 3)  oxyCODONE  5 mG/acetaminophen 325 mG 2Tablet(s) Oral every 6 hours PRN Moderate Pain (4 - 6)    PHYSICAL EXAM:    Vital Signs Last 24 Hrs  T(C): 36.9, Max: 37.6 ( @ 04:00)  T(F): 98.5, Max: 99.6 ( @ 04:00)  HR: 99 (92 - 116)  BP: 127/66 (93/57 - 140/66)  BP(mean): 89 (70 - 98)  RR: 24 (16 - 29)  SpO2: 92% (88% - 98%)    General: alert  oriented x ____ lethargic agitated                  cachexia  nonverbal  coma    Karnofsky:  %    HEENT: normal  dry mouth  ET tube/trach    Lungs: comfortable tachypnea/labored breathing  excessive secretions    CV: normal  tachycardia    GI: normal  distended  tender  no BS               PEG/NG/OG tube  constipation  last BM:     : normal  incontinent  oliguria/anuria  robb    MSK: normal  weakness  edema             ambulatory  bedbound/wheelchair bound    Skin: normal  pressure ulcers- Stage_____  no rash    LABS:                      12.3   20.2  )-----------( 177      ( 2017 05:56 )             36.4     -    133<L>  |  97<L>  |  82.0<H>  ----------------------------<  260<H>  4.7   |  18.0<L>  |  2.11<H>    Ca    8.8      2017 05:56  Phos  6.5       Mg     2.4             Urinalysis Basic - ( 2017 19:50 )    Color: Yellow / Appearance: Clear / S.020 / pH: x  Gluc: x / Ketone: Negative  / Bili: Negative / Urobili: Negative mg/dL   Blood: x / Protein: 15 mg/dL / Nitrite: Negative   Leuk Esterase: Negative / RBC: 0-2 /HPF / WBC 3-5   Sq Epi: x / Non Sq Epi: Few / Bacteria: Few      I&O's Summary  I & Os for 24h ending 2017 07:00  =============================================  IN: 2094 ml / OUT: 1130 ml / NET: 964 ml    I & Os for current day (as of 2017 12:24)  =============================================  IN: 1179 ml / OUT: 200 ml / NET: 979 ml      RADIOLOGY & ADDITIONAL STUDIES:    ADVANCE DIRECTIVES:   DNR YES NO  Completed on:                     MOLST  YES NO   Completed on:  Living Will  YES NO   Completed on: HPI: 67M with PMH as listed admitted  with respiratory distress, on Bipap in ICU.     PERTINENT PMH REVIEWED: Yes     PAST MEDICAL & SURGICAL HISTORY:  Non-small cell lung cancer, unspecified laterality  Hepatitis C: diagnosed a few years ago as per pt no treatment. iv drug use as teenager  Cough  Lung nodule  High cholesterol  Hypertension  Diabetes  DM (diabetes mellitus)  HTN (hypertension)  Portacath in place:   No significant past surgical history    SOCIAL HISTORY:  former smoker                                    Admitted from:  home      Surrogate - mark Rocha - [phone]    FAMILY HISTORY:  Maternal family history of cancer (Mother): pt not sure of type    Baseline ADLs (prior to admission):  Independen    Allergies    No Known Allergies    Present Symptoms:     Dyspnea: 0 1 2 3   Nausea/Vomiting: Yes No  Anxiety:  Yes No  Depression: Yes No  Fatigue: Yes No  Loss of appetite: Yes No    Pain:             Character-            Duration-            Effect-            Factors-            Frequency-            Location-            Severity-    Review of Systems: Reviewed                     Negative:                     Positive:  Unable to obtain due to poor mentation   All others negative    MEDICATIONS  (STANDING):  heparin  Injectable 5000Unit(s) SubCutaneous every 12 hours  dextrose 50% Injectable 50milliLiter(s) IV Push every 15 minutes  dextrose 50% Injectable 25milliLiter(s) IV Push every 15 minutes  cefepime  IVPB 1000milliGRAM(s) IV Intermittent every 12 hours  cefepime  IVPB     pantoprazole    Tablet 40milliGRAM(s) Oral before breakfast  insulin glargine Injectable (LANTUS) 40Unit(s) SubCutaneous every morning  azithromycin   Tablet 500milliGRAM(s) Oral daily  multiple electrolytes Injection Type 1 1000milliLiter(s) IV Continuous <Continuous>  metoclopramide 10milliGRAM(s) Oral every 8 hours  dicyclomine 10milliGRAM(s) Oral three times a day before meals  insulin Infusion 4Unit(s)/Hr IV Continuous <Continuous>    MEDICATIONS  (PRN):  morphine  - Injectable 2milliGRAM(s) IV Push every 4 hours PRN Severe Pain (7 - 10)  oxyCODONE  5 mG/acetaminophen 325 mG 1Tablet(s) Oral every 6 hours PRN Mild Pain (1 - 3)  oxyCODONE  5 mG/acetaminophen 325 mG 2Tablet(s) Oral every 6 hours PRN Moderate Pain (4 - 6)    PHYSICAL EXAM:    Vital Signs Last 24 Hrs  T(C): 36.9, Max: 37.6 ( @ 04:00)  T(F): 98.5, Max: 99.6 ( @ 04:00)  HR: 99 (92 - 116)  BP: 127/66 (93/57 - 140/66)  BP(mean): 89 (70 - 98)  RR: 24 (16 - 29)  SpO2: 92% (88% - 98%)    General: alert  oriented x ____ lethargic agitated                  cachexia  nonverbal  coma    Karnofsky:  %    HEENT: normal  dry mouth  ET tube/trach    Lungs: comfortable tachypnea/labored breathing  excessive secretions    CV: normal  tachycardia    GI: normal  distended  tender  no BS               PEG/NG/OG tube  constipation  last BM:     : normal  incontinent  oliguria/anuria  robb    MSK: normal  weakness  edema             ambulatory  bedbound/wheelchair bound    Skin: normal  pressure ulcers- Stage_____  no rash    LABS:                      12.3   20.2  )-----------( 177      ( 2017 05:56 )             36.4     -    133<L>  |  97<L>  |  82.0<H>  ----------------------------<  260<H>  4.7   |  18.0<L>  |  2.11<H>    Ca    8.8      2017 05:56  Phos  6.5     -  Mg     2.4             Urinalysis Basic - ( 2017 19:50 )    Color: Yellow / Appearance: Clear / S.020 / pH: x  Gluc: x / Ketone: Negative  / Bili: Negative / Urobili: Negative mg/dL   Blood: x / Protein: 15 mg/dL / Nitrite: Negative   Leuk Esterase: Negative / RBC: 0-2 /HPF / WBC 3-5   Sq Epi: x / Non Sq Epi: Few / Bacteria: Few      I&O's Summary  I & Os for 24h ending 2017 07:00  =============================================  IN: 2094 ml / OUT: 1130 ml / NET: 964 ml    I & Os for current day (as of 2017 12:24)  =============================================  IN: 1179 ml / OUT: 200 ml / NET: 979 ml      RADIOLOGY & ADDITIONAL STUDIES:    ADVANCE DIRECTIVES:   DNR YES NO  Completed on:                     MOLST  YES NO   Completed on:  Living Will  YES NO   Completed on: HPI: 67M with PMH as listed admitted  with respiratory distress requiring Bipap and ICU admission, now off bipap on nasal cannula doing better.     PERTINENT PMH REVIEWED: Yes     PAST MEDICAL & SURGICAL HISTORY:  Non-small cell lung cancer, unspecified laterality  Hepatitis C: diagnosed a few years ago as per pt no treatment. iv drug use as teenager  Cough  Lung nodule  High cholesterol  Hypertension  Diabetes  DM (diabetes mellitus)  HTN (hypertension)  Portacath in place:   No significant past surgical history    SOCIAL HISTORY:  former smoker                                    Admitted from:  home      HCP: Cruz Rocha - [phone]    FAMILY HISTORY:  Maternal family history of cancer (Mother): pt not sure of type    Baseline ADLs (prior to admission):  Independent    Allergies    No Known Allergies    Present Symptoms:     Dyspnea: 0   Nausea/Vomiting: No  Anxiety:  No  Depression: No  Fatigue: No  Loss of appetite: No    Pain: none currently but with coughing and certain movements has sharp abdominal pain.             Character-            Duration-            Effect-            Factors-            Frequency-            Location-            Severity-    Review of Systems: Reviewed                     Negative: no chest pain                     MEDICATIONS  (STANDING):  heparin  Injectable 5000Unit(s) SubCutaneous every 12 hours  dextrose 50% Injectable 50milliLiter(s) IV Push every 15 minutes  dextrose 50% Injectable 25milliLiter(s) IV Push every 15 minutes  cefepime  IVPB 1000milliGRAM(s) IV Intermittent every 12 hours  cefepime  IVPB     pantoprazole    Tablet 40milliGRAM(s) Oral before breakfast  insulin glargine Injectable (LANTUS) 40Unit(s) SubCutaneous every morning  azithromycin   Tablet 500milliGRAM(s) Oral daily  multiple electrolytes Injection Type 1 1000milliLiter(s) IV Continuous <Continuous>  metoclopramide 10milliGRAM(s) Oral every 8 hours  dicyclomine 10milliGRAM(s) Oral three times a day before meals  insulin Infusion 4Unit(s)/Hr IV Continuous <Continuous>    MEDICATIONS  (PRN):  morphine  - Injectable 2milliGRAM(s) IV Push every 4 hours PRN Severe Pain (7 - 10)  oxyCODONE  5 mG/acetaminophen 325 mG 1Tablet(s) Oral every 6 hours PRN Mild Pain (1 - 3)  oxyCODONE  5 mG/acetaminophen 325 mG 2Tablet(s) Oral every 6 hours PRN Moderate Pain (4 - 6)    PHYSICAL EXAM:    Vital Signs Last 24 Hrs  T(C): 36.9, Max: 37.6 ( @ 04:00)  T(F): 98.5, Max: 99.6 ( @ 04:00)  HR: 99 (92 - 116)  BP: 127/66 (93/57 - 140/66)  BP(mean): 89 (70 - 98)  RR: 24 (16 - 29)  SpO2: 92% (88% - 98%)    General: alert  oriented x 3     Karnofsky: 60 %    HEENT: normal      Lungs: comfortable     CV: normal      GI: normal      : normal      MSK: weakness     Skin: no rash    LABS:                      12.3   20.2  )-----------( 177      ( 2017 05:56 )             36.4         133<L>  |  97<L>  |  82.0<H>  ----------------------------<  260<H>  4.7   |  18.0<L>  |  2.11<H>    Ca    8.8      2017 05:56  Phos  6.5       Mg     2.4         Urinalysis Basic - ( 2017 19:50 )    Color: Yellow / Appearance: Clear / S.020 / pH: x  Gluc: x / Ketone: Negative  / Bili: Negative / Urobili: Negative mg/dL   Blood: x / Protein: 15 mg/dL / Nitrite: Negative   Leuk Esterase: Negative / RBC: 0-2 /HPF / WBC 3-5   Sq Epi: x / Non Sq Epi: Few / Bacteria: Few    I&O's Summary  I & Os for 24h ending 2017 07:00  =============================================  IN: 2094 ml / OUT: 1130 ml / NET: 964 ml    I & Os for current day (as of 2017 12:24)  =============================================  IN: 1179 ml / OUT: 200 ml / NET: 979 ml    RADIOLOGY & ADDITIONAL STUDIES:    CT Chest: 1.  Moderate multiloculated left pleural effusion.  Complete atelectasis of the left lower lobe and lingula.  Partial atelectasis of the left upper lobe.  2.  Nonspecific infrahilar opacity in the region of previous tumor within the right lower lobe measures approximately 1.9 x 2.5 cm (3:71), decreased in size from approximately 4.7 x 4.1 cm on 2015.    Mediastinal lymphadenopathy, worse since 2015.  Abdomen/pelvis: No evidence of bowel obstruction, active inflammatory process or intra-abdominal source for infection, within limits of a noncontrast CT scan.    ADVANCE DIRECTIVES: Full code

## 2017-06-22 NOTE — CONSULT NOTE ADULT - PROBLEM SELECTOR RECOMMENDATION 2
Per Oncology
-he states it is worse with coughing and certain movements. he is having trouble with bowel movements today but feels he needs to try to get on the commode now...

## 2017-06-22 NOTE — CONSULT NOTE ADULT - ATTENDING COMMENTS
Thank you for the opportunity to assist with the care of this patient.   [facility] [phone].

## 2017-06-22 NOTE — PROGRESS NOTE ADULT - SUBJECTIVE AND OBJECTIVE BOX
No Known Allergies                                                             Code Status:FULL    PATO BUSCH Patient is a 67y old  Male who presents with a chief complaint of Dyspnea and cough (2017 19:25)      BRIEF HOSPITAL COURSE: admitted with respiratory distress, TEO, and hyperglycemia. Historically with stage IV lung ca (PO chemo), DM (uncontrolled), Hep C (unknown treatment), and remote IVDA (teenager). Placed on antibiotics for possible pleural infection, insulin, and NIV.     Events last 24 hours: none acute + BM, remains with hyperglycemia    Review of systems:   abdominal bloating, distention, no pain; + BM +flatus. no respiratory complaints.     PAST MEDICAL & SURGICAL HISTORY:  Non-small cell lung cancer, unspecified laterality  Hepatitis C: diagnosed a few years ago as per pt no treatment. iv drug use as teenager  Cough  Lung nodule  High cholesterol  Hypertension  Diabetes  DM (diabetes mellitus)  HTN (hypertension)  Portacath in place:   No significant past surgical history        Medications:  cefepime  IVPB 1000milliGRAM(s) IV Intermittent every 12 hours  cefepime  IVPB     azithromycin   Tablet 500milliGRAM(s) Oral daily        morphine  - Injectable 2milliGRAM(s) IV Push every 4 hours PRN  oxyCODONE  5 mG/acetaminophen 325 mG 1Tablet(s) Oral every 6 hours PRN  oxyCODONE  5 mG/acetaminophen 325 mG 2Tablet(s) Oral every 6 hours PRN      heparin  Injectable 5000Unit(s) SubCutaneous every 12 hours    pantoprazole    Tablet 40milliGRAM(s) Oral before breakfast  metoclopramide 10milliGRAM(s) Oral every 8 hours  dicyclomine 10milliGRAM(s) Oral three times a day before meals      dextrose 50% Injectable 50milliLiter(s) IV Push every 15 minutes  dextrose 50% Injectable 25milliLiter(s) IV Push every 15 minutes  insulin glargine Injectable (LANTUS) 40Unit(s) SubCutaneous every morning  insulin Infusion 4Unit(s)/Hr IV Continuous <Continuous>    multiple electrolytes Injection Type 1 1000milliLiter(s) IV Continuous <Continuous>            ICU Vital Signs Last 24 Hrs  T(C): 37, Max: 37.6 (- @ 04:00)  T(F): 98.6, Max: 99.6 ( @ 04:00)  HR: 102 (85 - 116)  BP: 122/73 (93/57 - 140/66)  BP(mean): 90 (70 - 98)  ABP: --  ABP(mean): --  RR: 17 (13 - 29)  SpO2: 91% (88% - 98%)    CAPILLARY BLOOD GLUCOSE  258 (2017 07:00)  282 (2017 22:00)  277 (2017 17:00)  129 (2017 12:00)  115 (2017 11:00)            LABS:  CBC Full  -  ( 2017 05:56 )  WBC Count : 20.2 K/uL  Hemoglobin : 12.3 g/dL  Hematocrit : 36.4 %  Platelet Count - Automated : 177 K/uL  Mean Cell Volume : 75.2 fl  Mean Cell Hemoglobin : 25.4 pg  Mean Cell Hemoglobin Concentration : 33.8 g/dL  Auto Neutrophil # : x  Auto Lymphocyte # : x  Auto Monocyte # : x  Auto Eosinophil # : x  Auto Basophil # : x  Auto Neutrophil % : x  Auto Lymphocyte % : x  Auto Monocyte % : x  Auto Eosinophil % : x  Auto Basophil % : x        133<L>  |  97<L>  |  82.0<H>  ----------------------------<  260<H>  4.7   |  18.0<L>  |  2.11<H>    Ca    8.8      2017 05:56  Phos  6.5       Mg     2.4     -22        CARDIAC MARKERS ( 2017 15:02 )  x     / <0.01 ng/mL / x     / x     / x             Urinalysis Basic - ( 2017 19:50 )    Color: Yellow / Appearance: Clear / S.020 / pH: x  Gluc: x / Ketone: Negative  / Bili: Negative / Urobili: Negative mg/dL   Blood: x / Protein: 15 mg/dL / Nitrite: Negative   Leuk Esterase: Negative / RBC: 0-2 /HPF / WBC 3-5   Sq Epi: x / Non Sq Epi: Few / Bacteria: Few          CULTURES:  Culture Results:   Moderate Staphylococcus aureus Identification and susceptibility to  follow.  Culture in progress ( @ 14:11)  Culture Results:   No growth ( @ 19:49)      Physical Examination:    General: No acute distress.  Alert, oriented, interactive. Follows simple instructions    HEENT: Atraumatic, MMM, Pupils equal, reactive to light.  Symmetric.     PULM: reduced at bases, scant ronchi. dullness R > L    CVS: Regular rate and rhythm, no murmurs, rubs; S1/S2. No JVD/HJR: right ACW port, non-tender.     ABD: Soft, nondistended, nontender, normoactive bowel sounds, no masses    EXT: No edema, nontender. Distal pulses 2+ equal bilaterally    SKIN: Warm and well perfused, no rashes noted.    Neuro: nonfocal, moves all extremities.     RADIOLOGY: ***    CRITICAL CARE TIME SPENT: *** minutes No Known Allergies                                                             Code Status:FULL    PATO BUSCH Patient is a 67y old  Male who presents with a chief complaint of Dyspnea and cough (2017 19:25)      BRIEF HOSPITAL COURSE: admitted with respiratory distress, TEO, and hyperglycemia. Historically with stage IV lung ca (PO chemo), DM (uncontrolled), Hep C (unknown treatment), and remote IVDA (teenager). Placed on antibiotics for possible pleural infection, insulin, and NIV.     Events last 24 hours: none acute + BM, remains with hyperglycemia    Review of systems:   abdominal bloating, distention, no pain; + BM +flatus. no respiratory complaints.     PAST MEDICAL & SURGICAL HISTORY:  Non-small cell lung cancer, unspecified laterality  Hepatitis C: diagnosed a few years ago as per pt no treatment. iv drug use as teenager  Cough  Lung nodule  High cholesterol  Hypertension  Diabetes  DM (diabetes mellitus)  HTN (hypertension)  Portacath in place:   No significant past surgical history        Medications:  cefepime  IVPB 1000milliGRAM(s) IV Intermittent every 12 hours  cefepime  IVPB     azithromycin   Tablet 500milliGRAM(s) Oral daily        morphine  - Injectable 2milliGRAM(s) IV Push every 4 hours PRN  oxyCODONE  5 mG/acetaminophen 325 mG 1Tablet(s) Oral every 6 hours PRN  oxyCODONE  5 mG/acetaminophen 325 mG 2Tablet(s) Oral every 6 hours PRN      heparin  Injectable 5000Unit(s) SubCutaneous every 12 hours    pantoprazole    Tablet 40milliGRAM(s) Oral before breakfast  metoclopramide 10milliGRAM(s) Oral every 8 hours  dicyclomine 10milliGRAM(s) Oral three times a day before meals      dextrose 50% Injectable 50milliLiter(s) IV Push every 15 minutes  dextrose 50% Injectable 25milliLiter(s) IV Push every 15 minutes  insulin glargine Injectable (LANTUS) 40Unit(s) SubCutaneous every morning  insulin Infusion 4Unit(s)/Hr IV Continuous <Continuous>    multiple electrolytes Injection Type 1 1000milliLiter(s) IV Continuous <Continuous>            ICU Vital Signs Last 24 Hrs  T(C): 37, Max: 37.6 (- @ 04:00)  T(F): 98.6, Max: 99.6 ( @ 04:00)  HR: 102 (85 - 116)  BP: 122/73 (93/57 - 140/66)  BP(mean): 90 (70 - 98)  ABP: --  ABP(mean): --  RR: 17 (13 - 29)  SpO2: 91% (88% - 98%)    CAPILLARY BLOOD GLUCOSE  258 (2017 07:00)  282 (2017 22:00)  277 (2017 17:00)  129 (2017 12:00)  115 (2017 11:00)            LABS:  CBC Full  -  ( 2017 05:56 )  WBC Count : 20.2 K/uL  Hemoglobin : 12.3 g/dL  Hematocrit : 36.4 %  Platelet Count - Automated : 177 K/uL  Mean Cell Volume : 75.2 fl  Mean Cell Hemoglobin : 25.4 pg  Mean Cell Hemoglobin Concentration : 33.8 g/dL  Auto Neutrophil # : x  Auto Lymphocyte # : x  Auto Monocyte # : x  Auto Eosinophil # : x  Auto Basophil # : x  Auto Neutrophil % : x  Auto Lymphocyte % : x  Auto Monocyte % : x  Auto Eosinophil % : x  Auto Basophil % : x        133<L>  |  97<L>  |  82.0<H>  ----------------------------<  260<H>  4.7   |  18.0<L>  |  2.11<H>    Ca    8.8      2017 05:56  Phos  6.5       Mg     2.4     -22        CARDIAC MARKERS ( 2017 15:02 )  x     / <0.01 ng/mL / x     / x     / x             Urinalysis Basic - ( 2017 19:50 )    Color: Yellow / Appearance: Clear / S.020 / pH: x  Gluc: x / Ketone: Negative  / Bili: Negative / Urobili: Negative mg/dL   Blood: x / Protein: 15 mg/dL / Nitrite: Negative   Leuk Esterase: Negative / RBC: 0-2 /HPF / WBC 3-5   Sq Epi: x / Non Sq Epi: Few / Bacteria: Few          CULTURES:  Culture Results:   Moderate Staphylococcus aureus Identification and susceptibility to  follow.  Culture in progress ( @ 14:11)  Culture Results:   No growth ( @ 19:49)      Physical Examination:    General: No acute distress.  Alert, oriented, interactive. Follows simple instructions    HEENT: Atraumatic, MMM, Pupils equal, reactive to light.  Symmetric.     PULM: reduced at bases, scant ronchi. dullness L > R    CVS: Regular rate and rhythm, no murmurs, rubs; S1/S2. No JVD/HJR: right ACW port, non-tender.     ABD: Soft, distended, nontender, normoactive bowel sounds, no masses    EXT: dependent pitting 1+ edema, nontender. Distal pulses 2+ equal bilaterally    SKIN: Warm and well perfused, no rashes noted.    Neuro: nonfocal, moves all extremities.     RADIOLOGY:         CRITICAL CARE TIME SPENT: 45  minutes

## 2017-06-22 NOTE — PROGRESS NOTE ADULT - ASSESSMENT
67M admitted with respiratory distress/failure and evangelina, h/o stage IV lung CA, on oral chemo, uncontrolled diabetes, hep C

## 2017-06-22 NOTE — PROGRESS NOTE ADULT - SUBJECTIVE AND OBJECTIVE BOX
Renal :  General:  ·  Admission Date: 21-Mar-2017.   ·  Arrived From home .  ·  Source of Information patient.    Other Care Providers:  · Primary Care Provider :	Dr. Lorenzo [phone]	    Chief Complaint/Reason for Visit/HPI:     	"Lung cancer"	    History of Present Illness:  		        66 year old black male who is scheduled for an  Hyqms-j-ueci removal and  placement of a new one to continue chemo/immune therapy  for lung cancer, he said the present port is not functional     Allergies/Medications:     	No Known Allergies:     Home Medications:   * Incomplete Medication History as of 21-Mar-2017 16:10 documented in Prescription Writer  · 	insulin glargine 100 units/mL subcutaneous solution: Last Dose Taken:  , 46 unit(s) subcutaneous once a day (at bedtime)  · 	losartan 100 mg oral tablet: Last Dose Taken:  , 1 tab(s) orally once a day  · 	amLODIPine 10 mg oral tablet: Last Dose Taken:  , 1 tab(s) orally once a day  · 	NovoLOG 100 units/mL subcutaneous solution: 5 unit(s) subcutaneous 3 times a day (before meals)  · 	simvastatin 20 mg oral tablet: 1 tab(s) orally once a day (at bedtime)  · 	metoprolol succinate 50 mg oral tablet, extended release: 1 tab(s) orally once a day  · 	glimepiride 4 mg oral tablet: Last Dose Taken:  , 1 tab(s) orally once a day  · 	Lantus 100 units/mL subcutaneous solution: Last Dose Taken:  , 28 unit(s) subcutaneous once a day  · 	omeprazole 20 mg oral delayed release capsule: Last Dose Taken:  , 1 cap(s) orally once a day    PMH/PSH/FH/SH:     DM (diabetes mellitus)    Hepatitis C  diagnosed a few years ago as per pt no treatment. iv drug use as teenager  High cholesterol    HTN (hypertension)    Hypertension    Lung nodule.    Past Surgical History:  Portacath in place  2016.    Family History:  Mother  Still living? No  Maternal family history of cancer, Age at diagnosis: Age Unknown.    Social History:  · Marital Status		  · Occupation	construction	  · Lives With	alone	    Alcohol Use History:  · Have you ever consumed alcohol	never	    Tobacco Usage:  · Tobacco Usage: Former smoker	  · Tobacco Type: cigarettes	  · Number of Packs per Day: 0.5	  · Number of yrs: 40	  · Pack yrs: 20	  · Longest Period Tobacco-Free: Mendez 15 th 2017	  · Cessation Medication Offered: refused	          Hepatitis C Status:  · Hepatitis C Status	Positive	      Devices:  · Implants/Medical Devices	None; Vascular access device; ann cath	        Review of Systems:   · General	negative	  · Respiratory and Thorax Symptoms	cough  sputum production	  · Respiratory and Thorax Comments	occasional cough with clear sputum	  · Cardiovascular Symptoms	dyspnea on exertion	  · Gastrointestinal	negative	  · Genitourinary	negative	  · Musculoskeletal	negative	  · Neurological Comments	numbness to hand and feet	  · Psychiatric	negative	  · Hematology/Lymphatics	negative	  · Endocrine	negative	  · Allergic/Immunologic	negative	      Height/Weight/BSA/BMI:  · Height (FEET)	6 Feet	  · Height (INCHES)	3 Inch(s)	  · Height (CENTIMETERS)	190.5 Centimeter(s)	  · Dosing Weight (KILOGRAMS)	130.8 kg	  · Dosing Weight  (POUNDS)	288.3 Pound(s)	  · BSA (m2)	2.56 Meter Squared	  · BMI (kG/m2)	36	    T/HR/RR/BP/SPO2:  · Temp (F)	99.1 Degrees F	  · Temp (C)	37.3 Degrees C	  · Heart Rate	70 /min	  · Respiration Rate (breaths/min)	16 /min	  · BP Systolic	152 mm Hg	  · BP Diastolic	87 mm Hg	  · BP Noninvasive Mean	108 mm Hg	    Physical Exam:  · Constitutional	Well-developed, well nourished	  · Eyes	EOMI; PERRL; 	  · ENMT	No oral lesions; 	  · Neck	No bruits; no thyromegaly 	  		  		  · Back	No deformity or limitation of movement	  · Respiratory	Breath Sounds equal & clear to percussion & auscultation, no accessory muscle use	  · Cardiovascular	Regular rate & rhythm, normal S1, S2; no murmurs, gallops or rubs; no S3, S4	  · Gastrointestinal	Soft, non-tender, no hepatosplenomegaly, normal bowel sounds	  · Genitourinary	patient refused	  · Rectal	patient refused	  · Extremities	No cyanosis, clubbing or edema	  · Vascular	Equal and normal pulses (carotid, femoral, dorsalis pedis)	  · Neurological	Alert & oriented; no sensory, motor or coordination deficits, normal reflexes	  · Skin	No lesions; no rash	  · Lymph Nodes	No lymphadedenopathy	  · Musculoskeletal	No joint pain, swelling or deformity; no limitation of movement	  · Psychiatric	Affect and characteristics of appearance, verbalizations, behaviors are appropriate	    Results:   Comments:  · EKG and Interpretation	EKG reviewed, no acute changes, official reading pending.	      Diagnosis:        PMH:  · 	Hepatitis C: Entered Date: 06-Nov-2015 13:00, Status: Active, Scope: General, Description: diagnosed a few years ago as per pt no treatment. iv drug use as teenager, Entered By: D'Amico, Karyn L, Last Modified By: D'Amico, Karyn L    · 	Lung nodule: Entered Date: 06-Nov-2015 12:59, Status: Active, Scope: General, Entered By: D'Amico, Karyn L, Last Modified By: D'Amico, Karyn L  · 	High cholesterol: Entered Date: 09-May-2015 06:37, Status: Active, Scope: General, Entered By: Jeison Mckay, Last Modified By: Yair, RTIF  · 	Hypertension: Entered Date: 09-May-2015 06:37, Status: Active, Scope: General, Entered By: Jeison Mckay, Last Modified By: Interfaces, RTIF  · 	Diabetes: Entered Date: 09-May-2015 06:37, Status: Active, Scope: General, Entered By: Jeison Mckay, Last Modified By: Interfaces, RTIF  · 	DM (diabetes mellitus): Entered Date: 06-May-2015 09:04, Status: Active, Scope: General, Entered By: Marie Rao, Last Modified By: Yair, RTIF  · 	HTN (hypertension): Entered Date: 06-May-2015 09:04, Status: Active, Scope: General, Entered By: Marie Rao, Last Modified By: Yair, RTIF       PSH:  · 	No significant past surgical history: Entered Date: 09-May-2015 06:37, Status: Active, Scope: General, Entered By: Jeison Mckay, Last Modified By: Yair RTIF    Assessment and Plan:     		  Problem/Plan - 1:  ·  Problem: Disorder of lung.  S/P port removal and placement of ann cath.     Problem/Plan - 2:  ·  Problem: Hypertension.  Plan: continue medications ,    Problem/Plan - 3:  ·  Problem: DM (diabetes mellitus). Poorly controlled, Renal :  General:  ·  Admission Date: 21-Mar-2017.   ·  Arrived From home .  ·  Source of Information patient.    Other Care Providers:  · Primary Care Provider :	Dr. Lorenzo [phone]	    Chief Complaint/Reason for Visit/HPI:     	"Lung cancer"	    History of Present Illness:  		        66 year old black male who is scheduled for an  Biwjw-p-jhst removal and  placement of a new one to continue chemo/immune therapy  for lung cancer, he said the present port is not functional     Allergies/Medications:     	No Known Allergies:     Home Medications:   * Incomplete Medication History as of 21-Mar-2017 16:10 documented in Prescription Writer  · 	insulin glargine 100 units/mL subcutaneous solution: Last Dose Taken:  , 46 unit(s) subcutaneous once a day (at bedtime)  · 	losartan 100 mg oral tablet: Last Dose Taken:  , 1 tab(s) orally once a day  · 	amLODIPine 10 mg oral tablet: Last Dose Taken:  , 1 tab(s) orally once a day  · 	NovoLOG 100 units/mL subcutaneous solution: 5 unit(s) subcutaneous 3 times a day (before meals)  · 	simvastatin 20 mg oral tablet: 1 tab(s) orally once a day (at bedtime)  · 	metoprolol succinate 50 mg oral tablet, extended release: 1 tab(s) orally once a day  · 	glimepiride 4 mg oral tablet: Last Dose Taken:  , 1 tab(s) orally once a day  · 	Lantus 100 units/mL subcutaneous solution: Last Dose Taken:  , 28 unit(s) subcutaneous once a day  · 	omeprazole 20 mg oral delayed release capsule: Last Dose Taken:  , 1 cap(s) orally once a day    PMH/PSH/FH/SH:     DM (diabetes mellitus)    Hepatitis C  diagnosed a few years ago as per pt no treatment. iv drug use as teenager  High cholesterol    HTN (hypertension)    Hypertension    Lung nodule.    Past Surgical History:  Portacath in place  2016.    Family History:  Mother  Still living? No  Maternal family history of cancer, Age at diagnosis: Age Unknown.    Social History:  · Marital Status		  · Occupation	construction	  · Lives With	alone	    Alcohol Use History:  · Have you ever consumed alcohol	never	    Tobacco Usage:  · Tobacco Usage: Former smoker	  · Tobacco Type: cigarettes	  · Number of Packs per Day: 0.5	  · Number of yrs: 40	  · Pack yrs: 20	  · Longest Period Tobacco-Free: Mendez 15 th 2017	  · Cessation Medication Offered: refused	          Hepatitis C Status:  · Hepatitis C Status	Positive	      Devices:  · Implants/Medical Devices	None; Vascular access device; ann cath	        Review of Systems:   · General	negative	  · Respiratory and Thorax Symptoms	cough  sputum production	  · Respiratory and Thorax Comments	occasional cough with clear sputum	  · Cardiovascular Symptoms	dyspnea on exertion	  · Gastrointestinal	negative	  · Genitourinary	negative	  · Musculoskeletal	negative	  · Neurological Comments	numbness to hand and feet	  · Psychiatric	negative	  · Hematology/Lymphatics	negative	  · Endocrine	negative	  · Allergic/Immunologic	negative	      Height/Weight/BSA/BMI:  · Height (FEET)	6 Feet	  · Height (INCHES)	3 Inch(s)	  · Height (CENTIMETERS)	190.5 Centimeter(s)	  · Dosing Weight (KILOGRAMS)	130.8 kg	  · Dosing Weight  (POUNDS)	288.3 Pound(s)	  · BSA (m2)	2.56 Meter Squared	  · BMI (kG/m2)	36	    T/HR/RR/BP/SPO2:  · Temp (F)	99.1 Degrees F	  · Temp (C)	37.3 Degrees C	  · Heart Rate	70 /min	  · Respiration Rate (breaths/min)	16 /min	  · BP Systolic	152 mm Hg	  · BP Diastolic	87 mm Hg	  · BP Noninvasive Mean	108 mm Hg	    Physical Exam:  · Constitutional	Well-developed, well nourished	  · Eyes	EOMI; PERRL; 	  · ENMT	No oral lesions; 	  · Neck	No bruits; no thyromegaly 	  		  		  · Back	No deformity or limitation of movement	  · Respiratory	Breath Sounds equal & clear to percussion & auscultation, no accessory muscle use	  · Cardiovascular	Regular rate & rhythm, normal S1, S2; no murmurs, gallops or rubs; no S3, S4	  · Gastrointestinal	Soft, non-tender, no hepatosplenomegaly, normal bowel sounds	  · Genitourinary	patient refused	  · Rectal	patient refused	  · Extremities	No cyanosis, clubbing or edema	  · Vascular	Equal and normal pulses (carotid, femoral, dorsalis pedis)	  · Neurological	Alert & oriented; no sensory, motor or coordination deficits, normal reflexes	  · Skin	No lesions; no rash	  · Lymph Nodes	No lymphadedenopathy	  · Musculoskeletal	No joint pain, swelling or deformity; no limitation of movement	  · Psychiatric	Affect and characteristics of appearance, verbalizations, behaviors are appropriate	    Results:   Comments:  · EKG and Interpretation	EKG reviewed, no acute changes, official reading pending.	      Diagnosis:        PMH:  · 	Hepatitis C: Entered Date: 06-Nov-2015 13:00, Status: Active, Scope: General, Description: diagnosed a few years ago as per pt no treatment. iv drug use as teenager, Entered By: D'Amico, Karyn L, Last Modified By: D'Amico, Karyn L    · 	Lung nodule: Entered Date: 06-Nov-2015 12:59, Status: Active, Scope: General, Entered By: D'Amico, Karyn L, Last Modified By: D'Amico, Karyn L  · 	High cholesterol: Entered Date: 09-May-2015 06:37, Status: Active, Scope: General, Entered By: Jeison Mckay, Last Modified By: Yair, RTIF  · 	Hypertension: Entered Date: 09-May-2015 06:37, Status: Active, Scope: General, Entered By: Jeison Mckay, Last Modified By: Interfaces, RTIF  · 	Diabetes: Entered Date: 09-May-2015 06:37, Status: Active, Scope: General, Entered By: Jeison Mckay, Last Modified By: Interfaces, RTIF  · 	DM (diabetes mellitus): Entered Date: 06-May-2015 09:04, Status: Active, Scope: General, Entered By: Marie Rao, Last Modified By: Interfaces, RTIF  · 	HTN (hypertension): Entered Date: 06-May-2015 09:04, Status: Active, Scope: General, Entered By: Marie Rao, Last Modified By: Yair, RTIF       PSH:  · 	No significant past surgical history: Entered Date: 09-May-2015 06:37, Status: Active, Scope: General, Entered By: Jeison Mckay, Last Modified By: Yair, RTIF

## 2017-06-22 NOTE — PROGRESS NOTE ADULT - ASSESSMENT
1.Uncontrolled DM,    2.Hypovolemia 1.Acute on Chronic CKD    2. Metabolic Acidosis    3. Uncontrolled DM,

## 2017-06-23 DIAGNOSIS — B18.2 CHRONIC VIRAL HEPATITIS C: ICD-10-CM

## 2017-06-23 DIAGNOSIS — I10 ESSENTIAL (PRIMARY) HYPERTENSION: ICD-10-CM

## 2017-06-23 LAB
-  AMPICILLIN/SULBACTAM: SIGNIFICANT CHANGE UP
-  CEFAZOLIN: SIGNIFICANT CHANGE UP
-  CIPROFLOXACIN: SIGNIFICANT CHANGE UP
-  CLINDAMYCIN: SIGNIFICANT CHANGE UP
-  ERYTHROMYCIN: SIGNIFICANT CHANGE UP
-  GENTAMICIN: SIGNIFICANT CHANGE UP
-  LEVOFLOXACIN: SIGNIFICANT CHANGE UP
-  MOXIFLOXACIN(AEROBIC): SIGNIFICANT CHANGE UP
-  OXACILLIN: SIGNIFICANT CHANGE UP
-  PENICILLIN: SIGNIFICANT CHANGE UP
-  RIFAMPIN: SIGNIFICANT CHANGE UP
-  TETRACYCLINE: SIGNIFICANT CHANGE UP
-  TRIMETHOPRIM/SULFAMETHOXAZOLE: SIGNIFICANT CHANGE UP
-  VANCOMYCIN: SIGNIFICANT CHANGE UP
ANION GAP SERPL CALC-SCNC: 18 MMOL/L — HIGH (ref 5–17)
BUN SERPL-MCNC: 64 MG/DL — HIGH (ref 8–20)
CALCIUM SERPL-MCNC: 9 MG/DL — SIGNIFICANT CHANGE UP (ref 8.6–10.2)
CHLORIDE SERPL-SCNC: 97 MMOL/L — LOW (ref 98–107)
CO2 SERPL-SCNC: 21 MMOL/L — LOW (ref 22–29)
CREAT SERPL-MCNC: 1 MG/DL — SIGNIFICANT CHANGE UP (ref 0.5–1.3)
CULTURE RESULTS: SIGNIFICANT CHANGE UP
GLUCOSE SERPL-MCNC: 149 MG/DL — HIGH (ref 70–115)
HCT VFR BLD CALC: 36.2 % — LOW (ref 42–52)
HGB BLD-MCNC: 12.2 G/DL — LOW (ref 14–18)
MAGNESIUM SERPL-MCNC: 2.3 MG/DL — SIGNIFICANT CHANGE UP (ref 1.6–2.6)
MCHC RBC-ENTMCNC: 25.5 PG — LOW (ref 27–31)
MCHC RBC-ENTMCNC: 33.7 G/DL — SIGNIFICANT CHANGE UP (ref 32–36)
MCV RBC AUTO: 75.6 FL — LOW (ref 80–94)
METHOD TYPE: SIGNIFICANT CHANGE UP
ORGANISM # SPEC MICROSCOPIC CNT: SIGNIFICANT CHANGE UP
ORGANISM # SPEC MICROSCOPIC CNT: SIGNIFICANT CHANGE UP
PHOSPHATE SERPL-MCNC: 3.7 MG/DL — SIGNIFICANT CHANGE UP (ref 2.4–4.7)
PLATELET # BLD AUTO: 168 K/UL — SIGNIFICANT CHANGE UP (ref 150–400)
POTASSIUM SERPL-MCNC: 4 MMOL/L — SIGNIFICANT CHANGE UP (ref 3.5–5.3)
POTASSIUM SERPL-SCNC: 4 MMOL/L — SIGNIFICANT CHANGE UP (ref 3.5–5.3)
RBC # BLD: 4.79 M/UL — SIGNIFICANT CHANGE UP (ref 4.6–6.2)
RBC # FLD: 15.9 % — HIGH (ref 11–15.6)
SODIUM SERPL-SCNC: 136 MMOL/L — SIGNIFICANT CHANGE UP (ref 135–145)
SPECIMEN SOURCE: SIGNIFICANT CHANGE UP
WBC # BLD: 19.8 K/UL — HIGH (ref 4.8–10.8)
WBC # FLD AUTO: 19.8 K/UL — HIGH (ref 4.8–10.8)

## 2017-06-23 PROCEDURE — 99291 CRITICAL CARE FIRST HOUR: CPT

## 2017-06-23 PROCEDURE — 99233 SBSQ HOSP IP/OBS HIGH 50: CPT

## 2017-06-23 RX ORDER — INSULIN LISPRO 100/ML
VIAL (ML) SUBCUTANEOUS
Qty: 0 | Refills: 0 | Status: DISCONTINUED | OUTPATIENT
Start: 2017-06-23 | End: 2017-06-20

## 2017-06-23 RX ORDER — SODIUM BICARBONATE 1 MEQ/ML
650 SYRINGE (ML) INTRAVENOUS THREE TIMES A DAY
Qty: 0 | Refills: 0 | Status: DISCONTINUED | OUTPATIENT
Start: 2017-06-23 | End: 2017-06-20

## 2017-06-23 RX ORDER — INSULIN GLARGINE 100 [IU]/ML
20 INJECTION, SOLUTION SUBCUTANEOUS AT BEDTIME
Qty: 0 | Refills: 0 | Status: DISCONTINUED | OUTPATIENT
Start: 2017-06-23 | End: 2017-06-23

## 2017-06-23 RX ORDER — INSULIN LISPRO 100/ML
VIAL (ML) SUBCUTANEOUS
Qty: 0 | Refills: 0 | Status: DISCONTINUED | OUTPATIENT
Start: 2017-06-23 | End: 2017-06-23

## 2017-06-23 RX ORDER — INSULIN LISPRO 100/ML
10 VIAL (ML) SUBCUTANEOUS
Qty: 0 | Refills: 0 | Status: DISCONTINUED | OUTPATIENT
Start: 2017-06-23 | End: 2017-06-23

## 2017-06-23 RX ORDER — INSULIN LISPRO 100/ML
VIAL (ML) SUBCUTANEOUS AT BEDTIME
Qty: 0 | Refills: 0 | Status: DISCONTINUED | OUTPATIENT
Start: 2017-06-23 | End: 2017-06-20

## 2017-06-23 RX ORDER — INSULIN LISPRO 100/ML
10 VIAL (ML) SUBCUTANEOUS
Qty: 0 | Refills: 0 | Status: DISCONTINUED | OUTPATIENT
Start: 2017-06-23 | End: 2017-06-20

## 2017-06-23 RX ORDER — DEXTROSE 50 % IN WATER 50 %
1 SYRINGE (ML) INTRAVENOUS ONCE
Qty: 0 | Refills: 0 | Status: DISCONTINUED | OUTPATIENT
Start: 2017-06-23 | End: 2017-06-20

## 2017-06-23 RX ORDER — INSULIN GLARGINE 100 [IU]/ML
10 INJECTION, SOLUTION SUBCUTANEOUS ONCE
Qty: 0 | Refills: 0 | Status: COMPLETED | OUTPATIENT
Start: 2017-06-23 | End: 2017-06-23

## 2017-06-23 RX ORDER — GLUCAGON INJECTION, SOLUTION 0.5 MG/.1ML
1 INJECTION, SOLUTION SUBCUTANEOUS ONCE
Qty: 0 | Refills: 0 | Status: DISCONTINUED | OUTPATIENT
Start: 2017-06-23 | End: 2017-06-20

## 2017-06-23 RX ORDER — SODIUM CHLORIDE 9 MG/ML
1000 INJECTION, SOLUTION INTRAVENOUS
Qty: 0 | Refills: 0 | Status: DISCONTINUED | OUTPATIENT
Start: 2017-06-23 | End: 2017-06-20

## 2017-06-23 RX ORDER — INSULIN GLARGINE 100 [IU]/ML
40 INJECTION, SOLUTION SUBCUTANEOUS AT BEDTIME
Qty: 0 | Refills: 0 | Status: DISCONTINUED | OUTPATIENT
Start: 2017-06-23 | End: 2017-06-20

## 2017-06-23 RX ADMIN — HEPARIN SODIUM 5000 UNIT(S): 5000 INJECTION INTRAVENOUS; SUBCUTANEOUS at 06:07

## 2017-06-23 RX ADMIN — Medication 10 MILLIGRAM(S): at 06:07

## 2017-06-23 RX ADMIN — Medication 10 MILLIGRAM(S): at 21:08

## 2017-06-23 RX ADMIN — CEFEPIME 100 MILLIGRAM(S): 1 INJECTION, POWDER, FOR SOLUTION INTRAMUSCULAR; INTRAVENOUS at 06:06

## 2017-06-23 RX ADMIN — PANTOPRAZOLE SODIUM 40 MILLIGRAM(S): 20 TABLET, DELAYED RELEASE ORAL at 06:07

## 2017-06-23 RX ADMIN — SODIUM CHLORIDE 80 MILLILITER(S): 9 INJECTION, SOLUTION INTRAVENOUS at 08:54

## 2017-06-23 RX ADMIN — CEFEPIME 100 MILLIGRAM(S): 1 INJECTION, POWDER, FOR SOLUTION INTRAMUSCULAR; INTRAVENOUS at 17:49

## 2017-06-23 RX ADMIN — Medication 650 MILLIGRAM(S): at 21:08

## 2017-06-23 RX ADMIN — Medication 50 MILLIGRAM(S): at 06:07

## 2017-06-23 RX ADMIN — Medication 10 UNIT(S): at 16:57

## 2017-06-23 RX ADMIN — INSULIN GLARGINE 10 UNIT(S): 100 INJECTION, SOLUTION SUBCUTANEOUS at 11:00

## 2017-06-23 RX ADMIN — HEPARIN SODIUM 5000 UNIT(S): 5000 INJECTION INTRAVENOUS; SUBCUTANEOUS at 17:49

## 2017-06-23 RX ADMIN — Medication 10 MILLIGRAM(S): at 13:35

## 2017-06-23 RX ADMIN — Medication 10 UNIT(S): at 11:24

## 2017-06-23 RX ADMIN — AZITHROMYCIN 500 MILLIGRAM(S): 500 TABLET, FILM COATED ORAL at 11:24

## 2017-06-23 RX ADMIN — Medication 10 MILLIGRAM(S): at 11:00

## 2017-06-23 RX ADMIN — Medication 10 MILLIGRAM(S): at 16:57

## 2017-06-23 RX ADMIN — INSULIN GLARGINE 40 UNIT(S): 100 INJECTION, SOLUTION SUBCUTANEOUS at 08:22

## 2017-06-23 RX ADMIN — Medication 2: at 16:57

## 2017-06-23 RX ADMIN — INSULIN GLARGINE 40 UNIT(S): 100 INJECTION, SOLUTION SUBCUTANEOUS at 21:07

## 2017-06-23 RX ADMIN — Medication 650 MILLIGRAM(S): at 14:09

## 2017-06-23 NOTE — PROGRESS NOTE ADULT - SUBJECTIVE AND OBJECTIVE BOX
No Known Allergies                                                             Code Status:FULL    PATO BUSCH Patient is a 67y old  Male who presents with a chief complaint of Dyspnea and cough (19 Jun 2017 19:25)      BRIEF HOSPITAL COURSE: admitted with respiratory distress, TEO, and hyperglycemia. Historically with stage IV lung ca (PO chemo), DM (uncontrolled), Hep C (unknown treatment), and remote IVDA (teenager). Placed on antibiotics for possible pleural infection, insulin, and NIV.     Events last 24 hours: none acute + hyperglycemia on insulin infusion; Out of bed    Review of systems:   abdominal bloating, distention, no pain; +flatus. no respiratory complaints.     PAST MEDICAL & SURGICAL HISTORY:  Non-small cell lung cancer, unspecified laterality  Hepatitis C: diagnosed a few years ago as per pt no treatment. iv drug use as teenager  Cough  Lung nodule  High cholesterol  Hypertension  Diabetes  DM (diabetes mellitus)  HTN (hypertension)  Portacath in place: 2016  No significant past surgical history      MEDICATIONS  (STANDING):  heparin  Injectable 5000Unit(s) SubCutaneous every 12 hours  dextrose 50% Injectable 50milliLiter(s) IV Push every 15 minutes  dextrose 50% Injectable 25milliLiter(s) IV Push every 15 minutes  cefepime  IVPB 1000milliGRAM(s) IV Intermittent every 12 hours  cefepime  IVPB     pantoprazole    Tablet 40milliGRAM(s) Oral before breakfast  insulin glargine Injectable (LANTUS) 40Unit(s) SubCutaneous every morning  azithromycin   Tablet 500milliGRAM(s) Oral daily  metoclopramide 10milliGRAM(s) Oral every 8 hours  dicyclomine 10milliGRAM(s) Oral three times a day before meals  insulin Infusion 4Unit(s)/Hr IV Continuous <Continuous>  sodium chloride 0.45% 1000milliLiter(s) IV Continuous <Continuous>  metoprolol succinate ER 50milliGRAM(s) Oral daily    MEDICATIONS  (PRN):  morphine  - Injectable 2milliGRAM(s) IV Push every 4 hours PRN Severe Pain (7 - 10)  oxyCODONE  5 mG/acetaminophen 325 mG 1Tablet(s) Oral every 6 hours PRN Mild Pain (1 - 3)  oxyCODONE  5 mG/acetaminophen 325 mG 2Tablet(s) Oral every 6 hours PRN Moderate Pain (4 - 6)        ICU Vital Signs Last 24 Hrs  T(C): 37.2, Max: 37.4 (06-22 @ 16:00)  T(F): 99, Max: 99.4 (06-22 @ 16:00)  HR: 93 (85 - 116)  BP: 130/32 (85/47 - 136/70)  BP(mean): 89 (60 - 94)  ABP: --  ABP(mean): --  RR: 23 (15 - 28)  SpO2: 95% (91% - 98%)  CAPILLARY BLOOD GLUCOSE  140 (23 Jun 2017 07:00)  128 (23 Jun 2017 06:00)  112 (23 Jun 2017 05:00)  103 (23 Jun 2017 04:00)  118 (23 Jun 2017 03:00)  123 (23 Jun 2017 02:00)  112 (23 Jun 2017 01:00)  102 (23 Jun 2017 00:00)  128 (22 Jun 2017 23:00)  123 (22 Jun 2017 22:00)  144 (22 Jun 2017 21:00)  208 (22 Jun 2017 20:00)  199 (22 Jun 2017 19:00)  206 (22 Jun 2017 18:00)  174 (22 Jun 2017 17:00)  229 (22 Jun 2017 16:00)  249 (22 Jun 2017 15:00)  297 (22 Jun 2017 14:00)  295 (22 Jun 2017 13:00)  305 (22 Jun 2017 12:00)            LABS:                        12.3   20.2  )-----------( 177      ( 22 Jun 2017 05:56 )             36.4       06-22    133<L>  |  97<L>  |  82.0<H>  ----------------------------<  260<H>  4.7   |  18.0<L>  |  2.11<H>    Ca    8.8      22 Jun 2017 05:56  Phos  6.5     06-22  Mg     2.4     06-22        CULTURES:  Culture - Sputum . (06.21.17 @ 14:11)    Gram Stain:   No White blood cells  Few Gram positive cocci in pairs    Specimen Source: .Sputum Sputum    Culture Results:   Moderate Staphylococcus aureus Identification and susceptibility to  follow.  Culture in progress          Physical Examination:    General: No acute distress.  Alert, oriented, interactive. Follows simple instructions    HEENT: Atraumatic, MMM, Pupils equal, reactive to light.  Symmetric.     PULM: reduced at bases, scant ronchi. dullness L > R    CVS: Regular rate and rhythm, no murmurs, rubs; S1/S2. No JVD/HJR: right ACW port, non-tender.     ABD: Soft, distended, nontender, normoactive bowel sounds, no masses    EXT: dependent pitting 1+ edema, nontender. Distal pulses 2+ equal bilaterally    SKIN: Warm and well perfused, no rashes noted.    Neuro: nonfocal, moves all extremities.     CRITICAL CARE TIME SPENT: 35  minutes

## 2017-06-23 NOTE — PROGRESS NOTE ADULT - ATTENDING COMMENTS
The patient is in critical condition and requires ICU care and monitoring. Total Critical Care time spent by attending physician was __35 _ minutes, excluding procedure time.     Palliative/Ethics: palliative following advanced illness planning. Out of bed as tolerated    RASS 0  CAM-ICU neg  Sedation: none  VENT Bundle Reviewed:   Glycemic Control: insulin infusion  DVT PPX: heparin bid  Nutrition: PO as tolerated diabetic diet  PT/OT: ordered oob as tolerated    Invasive Lines/Dunn:
The patient is in critical condition and requires ICU care and monitoring. Total Critical Care time spent by attending physician was __45_______ minutes, excluding procedure time.     Palliative/Ethics: palliative to follow. advanced illness planning.     RASS 0  CAM-ICU neg  Sedation: none  VENT Bundle Reviewed:   Glycemic Control: insulin infusion  DVT PPX: heparin bid  Nutrition: PO as tolerated diabetic diet  PT/OT: ordered oob as tolerated    Invasive Lines/Dunn:

## 2017-06-23 NOTE — CHART NOTE - NSCHARTNOTEFT_GEN_A_CORE
adjusted diabetes/insulin     - on insulin infusion, required 91 units in 23 hours.   - increasing lantus to 50 Units q AM and 20 units q PM (home dose was less than this, however has A1c ~12)  - premeal 10 Units  - mod dose extra coverage  - will likely require daily adjustments of premeal needs.   - will d/c insulin infusion this afternoon, hopefully.

## 2017-06-23 NOTE — CONSULT NOTE ADULT - ASSESSMENT
1.Uncontrolled DM,    2.Hypovolemia
Mr. Rocha is a very pleasant 68 yo M with stage IV lung cancer on second line nivolumab, with mixed response on most recent PET scan, who presents with SOB and abdominal pain.  He was found to be in acute renal failure which is resolving.  Also with hyperglycemia, on insulin drip.  On O2 nasal canula for respiratory distress.  Being treated for PNA.    Chemotherapy is on hold while he is in the hospital.    We will resume treatment as an out patient.
67M with lung cancer on opdivo, hep c, admitted initially to MICU for respiratory failure requiring bipap, potential infectious process now on nasal cannula.

## 2017-06-23 NOTE — PROGRESS NOTE ADULT - SUBJECTIVE AND OBJECTIVE BOX
NEPHROLOGY INTERVAL HPI/OVERNIGHT EVENTS:    MEDICATIONS  (STANDING):  heparin  Injectable 5000Unit(s) SubCutaneous every 12 hours  dextrose 50% Injectable 50milliLiter(s) IV Push every 15 minutes  dextrose 50% Injectable 25milliLiter(s) IV Push every 15 minutes  cefepime  IVPB 1000milliGRAM(s) IV Intermittent every 12 hours  cefepime  IVPB     pantoprazole    Tablet 40milliGRAM(s) Oral before breakfast  azithromycin   Tablet 500milliGRAM(s) Oral daily  metoclopramide 10milliGRAM(s) Oral every 8 hours  dicyclomine 10milliGRAM(s) Oral three times a day before meals  insulin Infusion 4Unit(s)/Hr IV Continuous <Continuous>  metoprolol succinate ER 50milliGRAM(s) Oral daily  insulin glargine Injectable (LANTUS) 20Unit(s) SubCutaneous at bedtime  insulin glargine Injectable (LANTUS) 10Unit(s) SubCutaneous once  insulin lispro Injectable (HumaLOG) 10Unit(s) SubCutaneous three times a day before meals  insulin lispro (HumaLOG) corrective regimen sliding scale  SubCutaneous three times a day before meals    MEDICATIONS  (PRN):  morphine  - Injectable 2milliGRAM(s) IV Push every 4 hours PRN Severe Pain (7 - 10)  oxyCODONE  5 mG/acetaminophen 325 mG 1Tablet(s) Oral every 6 hours PRN Mild Pain (1 - 3)  oxyCODONE  5 mG/acetaminophen 325 mG 2Tablet(s) Oral every 6 hours PRN Moderate Pain (4 - 6)      Allergies    No Known Allergies    Intolerances          REVIEW OF SYSTEMS:    CONSTITUTIONAL: No fever, weight loss, or fatigue  EYES: No eye pain, visual disturbances, or discharge  ENMT:  No difficulty hearing, tinnitus, vertigo; No sinus or throat pain  NECK: No pain or stiffness  BREASTS: No pain, masses, or nipple discharge  RESPIRATORY: No cough, wheezing, chills or hemoptysis; No shortness of breath  CARDIOVASCULAR: No chest pain, palpitations, dizziness, or leg swelling  GASTROINTESTINAL: No abdominal or epigastric pain. No nausea, vomiting, or hematemesis; No diarrhea or constipation. No melena or hematochezia.  GENITOURINARY: No dysuria, frequency, hematuria, or incontinence  NEUROLOGICAL: No headaches, memory loss, loss of strength, numbness, or tremors  SKIN: No itching, burning, rashes, or lesions   LYMPH NODES: No enlarged glands  ENDOCRINE: No heat or cold intolerance; No hair loss  MUSCULOSKELETAL: No joint pain or swelling; No muscle, back, or extremity pain  PSYCHIATRIC: No depression, anxiety, mood swings, or difficulty sleeping  HEME/LYMPH: No easy bruising, or bleeding gums  ALLERY AND IMMUNOLOGIC: No hives or eczema      Vital Signs Last 24 Hrs  T(C): 37.2, Max: 37.4 (06-22 @ 16:00)  T(F): 99, Max: 99.4 (06-22 @ 16:00)  HR: 91 (85 - 116)  BP: 129/66 (85/47 - 136/70)  BP(mean): 91 (60 - 94)  RR: 27 (15 - 28)  SpO2: 94% (91% - 98%)    PHYSICAL EXAM:    GENERAL: NAD, well-groomed, well-developed  HEAD:  Atraumatic, Normocephalic  EYES: EOMI, PERRLA, conjunctiva and sclera clear  ENMT: No tonsillar erythema, exudates, or enlargement; Moist mucous membranes, Good dentition, No lesions  NECK: Supple, No JVD, Normal thyroid  NERVOUS SYSTEM:  Alert & Oriented X3,   CHEST/LUNG: Clear to percussion bilaterally; No rales, rhonchi, wheezing, or rubs  HEART: Regular rate and rhythm; No murmurs, rubs, or gallops  ABDOMEN: Soft, Nontender, Nondistended; Bowel sounds present  EXTREMITIES:  2+ Peripheral Pulses, No clubbing, cyanosis, or edema      LABS:                        12.2   19.8  )-----------( 168      ( 23 Jun 2017 08:07 )             36.2     06-23    136  |  97<L>  |  64.0<H>  ----------------------------<  149<H>  4.0   |  21.0<L>  |  1.00    Ca    9.0      23 Jun 2017 08:07  Phos  3.7     06-23  Mg     2.3     06-23          Magnesium, Serum: 2.3 mg/dL (06-23 @ 08:07)  Phosphorus Level, Serum: 3.7 mg/dL (06-23 @ 08:07)      RADIOLOGY & ADDITIONAL TESTS:

## 2017-06-23 NOTE — PROGRESS NOTE ADULT - SUBJECTIVE AND OBJECTIVE BOX
PATO JO-ANN     Chief Complaint: Patient is a 67y old  Male who presents with a chief complaint of Dyspnea and cough (19 Jun 2017 19:25)      PAST MEDICAL & SURGICAL HISTORY:  Non-small cell lung cancer, unspecified laterality  Hepatitis C: diagnosed a few years ago as per pt no treatment. iv drug use as teenager  Cough  Lung nodule  High cholesterol  Hypertension  Diabetes  DM (diabetes mellitus)  HTN (hypertension)  Portacath in place: 2016  No significant past surgical history      HPI/OVERNIGHT EVENTS: Patient sitting up eating dinner comfortably.    MEDICATIONS  (STANDING):  heparin  Injectable 5000Unit(s) SubCutaneous every 12 hours  dextrose 50% Injectable 50milliLiter(s) IV Push every 15 minutes  cefepime  IVPB 1000milliGRAM(s) IV Intermittent every 12 hours  cefepime  IVPB     pantoprazole    Tablet 40milliGRAM(s) Oral before breakfast  metoclopramide 10milliGRAM(s) Oral every 8 hours  dicyclomine 10milliGRAM(s) Oral three times a day before meals  metoprolol succinate ER 50milliGRAM(s) Oral daily  sodium bicarbonate 650milliGRAM(s) Oral three times a day  insulin glargine Injectable (LANTUS) 40Unit(s) SubCutaneous at bedtime  insulin lispro Injectable (HumaLOG) 10Unit(s) SubCutaneous three times a day before meals  insulin lispro (HumaLOG) corrective regimen sliding scale  SubCutaneous three times a day before meals  insulin lispro (HumaLOG) corrective regimen sliding scale  SubCutaneous at bedtime  dextrose 5%. 1000milliLiter(s) IV Continuous <Continuous>      Vital Signs Last 24 Hrs  T(C): 36.8, Max: 37.2 (06-23 @ 08:00)  T(F): 98.2, Max: 99 (06-23 @ 08:00)  HR: 106 (85 - 116)  BP: 144/69 (85/47 - 162/78)  BP(mean): 99 (60 - 103)  RR: 25 (16 - 27)  SpO2: 91% (91% - 99%)    PHYSICAL EXAM:  Constitutional: NAD, well-groomed, well-developed  HEENT: PERRLA, EOMI, Normal Hearing, MMM  Neck: No LAD, No JVD  Back: Normal spine flexure, No CVA tenderness  Respiratory: CTAB Cardiovascular: S1 and S2, RRR, no M/G/R  Gastrointestinal: BS+, soft, NT/ND  Extremities: No peripheral edema  Vascular: 2+ peripheral pulses  Neurological: A/O x 3, no focal deficits  Psychiatric: Normal mood, normal affect  Musculoskeletal: 5/5 strength b/l upper and lower extremities  Skin: No rashes    CAPILLARY BLOOD GLUCOSE  197 (23 Jun 2017 17:00)  149 (23 Jun 2017 11:00)  193 (23 Jun 2017 10:00)  218 (23 Jun 2017 09:00)  172 (23 Jun 2017 08:00)  140 (23 Jun 2017 07:00)  128 (23 Jun 2017 06:00)  112 (23 Jun 2017 05:00)  103 (23 Jun 2017 04:00)  118 (23 Jun 2017 03:00)  123 (23 Jun 2017 02:00)  112 (23 Jun 2017 01:00)  102 (23 Jun 2017 00:00)  128 (22 Jun 2017 23:00)  123 (22 Jun 2017 22:00)  144 (22 Jun 2017 21:00)  208 (22 Jun 2017 20:00)  199 (22 Jun 2017 19:00)  206 (22 Jun 2017 18:00)  174 (22 Jun 2017 17:00)    LABS:                        12.2   19.8  )-----------( 168      ( 23 Jun 2017 08:07 )             36.2     06-23    136  |  97<L>  |  64.0<H>  ----------------------------<  149<H>  4.0   |  21.0<L>  |  1.00    Ca    9.0      23 Jun 2017 08:07  Phos  3.7     06-23  Mg     2.3     06-23            RADIOLOGY & ADDITIONAL TESTS:

## 2017-06-23 NOTE — PROGRESS NOTE ADULT - PROBLEM SELECTOR PLAN 3
- 5L NC O2; compressive atelectasis large contributor to this.   - NIV qhs/prn
- 5L NC O2  - NIV qhs/prn
-on Zithromax and cefepime  -will use nocturnal BiPAP
Resolved
renal consult ? pre-renal

## 2017-06-23 NOTE — CONSULT NOTE ADULT - SUBJECTIVE AND OBJECTIVE BOX
Hematology Oncology Consult Note    The patient was seen and examined at bedside.    PATO BUSCH is a 67y Male with history of HEME/0NC DISEASE admitted with Shortness of breath.  He has stage IV disease and is being treated with nivolumab, most recent PET scan showed a mixed response.  He is also complaining of abdominal pain.    Interval History:    He is feeling better since admission, pain has decreased.    ROS     HEALTH MAINTENANCE:  Colon: last colonoscopy was performed on ....  Prostate: last PSA was ....  Lung: a low dose helical CT was performed on....for RF's including x ppy cigs and current smoker/quit <15 yrs ago       PAST MEDICAL & SURGICAL HISTORY:  Non-small cell lung cancer, unspecified laterality  Hepatitis C: diagnosed a few years ago as per pt no treatment. iv drug use as teenager  Cough  Lung nodule  High cholesterol  Hypertension  Diabetes  DM (diabetes mellitus)  HTN (hypertension)  Portacath in place:   No significant past surgical history      Allergies:  No Known Allergies      Medications:  heparin  Injectable 5000Unit(s) SubCutaneous every 12 hours  dextrose 50% Injectable 50milliLiter(s) IV Push every 15 minutes  dextrose 50% Injectable 25milliLiter(s) IV Push every 15 minutes  cefepime  IVPB 1000milliGRAM(s) IV Intermittent every 12 hours  cefepime  IVPB     pantoprazole    Tablet 40milliGRAM(s) Oral before breakfast  morphine  - Injectable 2milliGRAM(s) IV Push every 4 hours PRN  oxyCODONE  5 mG/acetaminophen 325 mG 1Tablet(s) Oral every 6 hours PRN  oxyCODONE  5 mG/acetaminophen 325 mG 2Tablet(s) Oral every 6 hours PRN  insulin glargine Injectable (LANTUS) 40Unit(s) SubCutaneous every morning  azithromycin   Tablet 500milliGRAM(s) Oral daily  metoclopramide 10milliGRAM(s) Oral every 8 hours  dicyclomine 10milliGRAM(s) Oral three times a day before meals  insulin Infusion 4Unit(s)/Hr IV Continuous <Continuous>  sodium chloride 0.45% 1000milliLiter(s) IV Continuous <Continuous>  metoprolol succinate ER 50milliGRAM(s) Oral daily      Social History:    FAMILY HISTORY:  Maternal family history of cancer (Mother): pt not sure of type      PHYSICAL EXAM:    T(F): 99, Max: 99.4 ( @ 16:00)  HR: 87 (85 - 116)  BP: 125/64 (85/47 - 136/70)  RR: 19 (15 - 28)  SpO2: 96% (91% - 98%)  Wt(kg): --    Daily     Daily Weight in k.4 (2017 06:00)    Gen: well developed, well nourished, comfortable  HEENT: normocephalic/atraumatic, no conjunctival pallor, no scleral icterus, no oral thrush/mucosal bleeding/mucositis  Neck: supple, no masses, no JVD  Cardiovascular: RR, nl S1S2, no murmurs/rubs/gallops  Respiratory: clear air entry b/l  Gastrointestinal: BS+, soft, NT/ND, no masses, no splenomegaly, no hepatomegaly, no evidence for ascites, mildly tender to palpation  Extremities: no clubbing/cyanosis, no edema, no calf tenderness  Neurological: no focal deficits  Skin: no rash on visible skin, excessive ecchymoses or petechiae  Lymph Nodes:  no significant peripheral adenopathy   Musculoskeletal:  full ROM  Psychiatric:  mood stable            Labs:                          12.2   19.8  )-----------( 168      ( 2017 08:07 )             36.2     CBC Full  -  ( 2017 08:07 )  WBC Count : 19.8 K/uL  Hemoglobin : 12.2 g/dL  Hematocrit : 36.2 %  Platelet Count - Automated : 168 K/uL  Mean Cell Volume : 75.6 fl  Mean Cell Hemoglobin : 25.5 pg  Mean Cell Hemoglobin Concentration : 33.7 g/dL  Auto Neutrophil # : x  Auto Lymphocyte # : x  Auto Monocyte # : x  Auto Eosinophil # : x  Auto Basophil # : x  Auto Neutrophil % : x  Auto Lymphocyte % : x  Auto Monocyte % : x  Auto Eosinophil % : x  Auto Basophil % : x        06-23    136  |  97<L>  |  64.0<H>  ----------------------------<  149<H>  4.0   |  21.0<L>  |  1.00    Ca    9.0      2017 08:07  Phos  3.7     06-23  Mg     2.3     06-23            Other Labs:    Cultures:    Pathology:    Imaging Studies:      Images:

## 2017-06-24 LAB
ANION GAP SERPL CALC-SCNC: 17 MMOL/L — SIGNIFICANT CHANGE UP (ref 5–17)
ANISOCYTOSIS BLD QL: SLIGHT — SIGNIFICANT CHANGE UP
BUN SERPL-MCNC: 44 MG/DL — HIGH (ref 8–20)
CALCIUM SERPL-MCNC: 8.6 MG/DL — SIGNIFICANT CHANGE UP (ref 8.6–10.2)
CHLORIDE SERPL-SCNC: 100 MMOL/L — SIGNIFICANT CHANGE UP (ref 98–107)
CO2 SERPL-SCNC: 22 MMOL/L — SIGNIFICANT CHANGE UP (ref 22–29)
CREAT SERPL-MCNC: 0.75 MG/DL — SIGNIFICANT CHANGE UP (ref 0.5–1.3)
GLUCOSE SERPL-MCNC: 150 MG/DL — HIGH (ref 70–115)
HCT VFR BLD CALC: 35 % — LOW (ref 42–52)
HGB BLD-MCNC: 11.7 G/DL — LOW (ref 14–18)
LYMPHOCYTES # BLD AUTO: 13 % — LOW (ref 20–55)
MACROCYTES BLD QL: SLIGHT — SIGNIFICANT CHANGE UP
MAGNESIUM SERPL-MCNC: 2.2 MG/DL — SIGNIFICANT CHANGE UP (ref 1.6–2.6)
MCHC RBC-ENTMCNC: 25.2 PG — LOW (ref 27–31)
MCHC RBC-ENTMCNC: 33.4 G/DL — SIGNIFICANT CHANGE UP (ref 32–36)
MCV RBC AUTO: 75.3 FL — LOW (ref 80–94)
MONOCYTES NFR BLD AUTO: 16 % — HIGH (ref 3–10)
NEUTROPHILS NFR BLD AUTO: 71 % — SIGNIFICANT CHANGE UP (ref 37–73)
PHOSPHATE SERPL-MCNC: 3 MG/DL — SIGNIFICANT CHANGE UP (ref 2.4–4.7)
PLAT MORPH BLD: NORMAL — SIGNIFICANT CHANGE UP
PLATELET # BLD AUTO: 182 K/UL — SIGNIFICANT CHANGE UP (ref 150–400)
POIKILOCYTOSIS BLD QL AUTO: SLIGHT — SIGNIFICANT CHANGE UP
POTASSIUM SERPL-MCNC: 4.1 MMOL/L — SIGNIFICANT CHANGE UP (ref 3.5–5.3)
POTASSIUM SERPL-SCNC: 4.1 MMOL/L — SIGNIFICANT CHANGE UP (ref 3.5–5.3)
RBC # BLD: 4.65 M/UL — SIGNIFICANT CHANGE UP (ref 4.6–6.2)
RBC # FLD: 15.8 % — HIGH (ref 11–15.6)
RBC BLD AUTO: ABNORMAL
SODIUM SERPL-SCNC: 139 MMOL/L — SIGNIFICANT CHANGE UP (ref 135–145)
TSH SERPL-MCNC: 0.02 UIU/ML — LOW (ref 0.27–4.2)
WBC # BLD: 18.7 K/UL — HIGH (ref 4.8–10.8)
WBC # FLD AUTO: 18.7 K/UL — HIGH (ref 4.8–10.8)

## 2017-06-24 PROCEDURE — 99233 SBSQ HOSP IP/OBS HIGH 50: CPT

## 2017-06-24 RX ADMIN — HEPARIN SODIUM 5000 UNIT(S): 5000 INJECTION INTRAVENOUS; SUBCUTANEOUS at 05:47

## 2017-06-24 RX ADMIN — Medication 650 MILLIGRAM(S): at 14:45

## 2017-06-24 RX ADMIN — Medication 650 MILLIGRAM(S): at 05:46

## 2017-06-24 RX ADMIN — Medication 2: at 16:28

## 2017-06-24 RX ADMIN — Medication 2: at 08:00

## 2017-06-24 RX ADMIN — Medication 2: at 12:01

## 2017-06-24 RX ADMIN — Medication 650 MILLIGRAM(S): at 21:28

## 2017-06-24 RX ADMIN — CEFEPIME 100 MILLIGRAM(S): 1 INJECTION, POWDER, FOR SOLUTION INTRAMUSCULAR; INTRAVENOUS at 18:23

## 2017-06-24 RX ADMIN — INSULIN GLARGINE 40 UNIT(S): 100 INJECTION, SOLUTION SUBCUTANEOUS at 21:28

## 2017-06-24 RX ADMIN — Medication 10 MILLIGRAM(S): at 14:45

## 2017-06-24 RX ADMIN — Medication 10 MILLIGRAM(S): at 05:47

## 2017-06-24 RX ADMIN — CEFEPIME 100 MILLIGRAM(S): 1 INJECTION, POWDER, FOR SOLUTION INTRAMUSCULAR; INTRAVENOUS at 05:46

## 2017-06-24 RX ADMIN — HEPARIN SODIUM 5000 UNIT(S): 5000 INJECTION INTRAVENOUS; SUBCUTANEOUS at 18:23

## 2017-06-24 RX ADMIN — Medication 50 MILLIGRAM(S): at 05:46

## 2017-06-24 RX ADMIN — Medication 10 MILLIGRAM(S): at 21:28

## 2017-06-24 RX ADMIN — Medication 10 UNIT(S): at 16:29

## 2017-06-24 RX ADMIN — Medication 10 UNIT(S): at 12:03

## 2017-06-24 RX ADMIN — Medication 10 MILLIGRAM(S): at 07:59

## 2017-06-24 RX ADMIN — Medication 10 UNIT(S): at 08:01

## 2017-06-24 RX ADMIN — PANTOPRAZOLE SODIUM 40 MILLIGRAM(S): 20 TABLET, DELAYED RELEASE ORAL at 08:00

## 2017-06-24 NOTE — PROGRESS NOTE ADULT - PROBLEM SELECTOR PLAN 1
Metabolic Acidosis - resolved,    Underlying DM Nephropathy CKD stage 1  Monitor kidney function and Urine outut
With Metabolic Acidosis - resolving  Continue 0.45Nacl with Bicarb for one more day  Underlying DM Nephropathy CKD stage 2  Monitor kidney function and Urine outut
on IVF, will trend. I presume more CKD on TEO as he was started on nivolumab; renal following. likely a pre-renal component.  - contributing to his acidosis and anion gap
-remains on insulin infusion, titrating as per MICU protocol  -will continue Lantus with goal of coming off drip  -carb controlled diet  -diabetic education
Increasing tumor burden. Status post chemo from Dr Ramsay. CT surgery recommends no intervention.
on IVF, will trend. I presume more CKD on TEO as he was started on nivolumab; renal following. likely a pre-renal component.  - contributing to his acidosis and anion gap
on outpatient chemo
Acute on chronic CKD with Metabolic Acidosis  0.45 Nacl with Bicarb IV added  Monitor Urine output and kidney function

## 2017-06-24 NOTE — PROGRESS NOTE ADULT - SUBJECTIVE AND OBJECTIVE BOX
PATO BUSCH   ------------------------------  The patient was seen and evaluated for pneumonia.  The patient is in no acute distress.  Denied any chest pain, palpitations, or abdominal pain.  Less dyspnea. Improved appetite.    Vital Signs Last 24 Hrs  T(C): 37.5, Max: 37.5 (06-24 @ 16:11)  T(F): 99.5, Max: 99.5 (06-24 @ 16:11)  HR: 97 (87 - 107)  BP: 137/70 (121/63 - 152/75)  BP(mean): 96 (86 - 104)  RR: 27 (21 - 31)  SpO2: 99% (91% - 100%)    PHYSICAL EXAMINATION:  ----------------------------------------  General appearance: NAD, Awake, Alert  HEENT: NCAT, Conjunctiva clear, EOMI, Pupils reactive  Neck: Supple, No JVD, No tenderness  Lungs: Clear to auscultation, Breath sound equal bilaterally, No wheezes, No rales  Cardiovascular: S1S2, Regular rhythm  Abdomen: Soft, Nontender, Nondistended, No guarding/rebound, Positive bowel sounds  Extremities: No clubbing, No cyanosis, No edema, No calf tenderness  Neuro: Strength equal bilaterally, No tremors  Psychiatric: Appropriate mood, Normal affect    CAPILLARY BLOOD GLUCOSE  183 (24 Jun 2017 11:00)  137 (24 Jun 2017 07:35)  244 (23 Jun 2017 21:00)    LABS:  ------------------------------             11.7   18.7  )-----------( 182      ( 24 Jun 2017 05:44 )             35.0     06-24    139  |  100  |  44.0<H>  ----------------------------<  150<H>  4.1   |  22.0  |  0.75    Ca    8.6      24 Jun 2017 05:44  Phos  3.0     06-24  Mg     2.2     06-24    RECENT CULTURES:  06-21 .Blood Blood XXXX XXXX   No growth at 48 hours    06-21 .Sputum Sputum Staphylococcus aureus   No White blood cells  Few Gram positive cocci in pairs   Moderate Staphylococcus aureus  No Routine respiratory karuna present    06-20 .Urine Clean Catch (Midstream) XXXX XXXX   No growth    MEDICATIONS  (STANDING):  heparin  Injectable 5000Unit(s) SubCutaneous every 12 hours  dextrose 50% Injectable 50milliLiter(s) IV Push every 15 minutes  cefepime  IVPB 1000milliGRAM(s) IV Intermittent every 12 hours  cefepime  IVPB     pantoprazole    Tablet 40milliGRAM(s) Oral before breakfast  metoclopramide 10milliGRAM(s) Oral every 8 hours  metoprolol succinate ER 50milliGRAM(s) Oral daily  sodium bicarbonate 650milliGRAM(s) Oral three times a day  insulin glargine Injectable (LANTUS) 40Unit(s) SubCutaneous at bedtime  insulin lispro Injectable (HumaLOG) 10Unit(s) SubCutaneous three times a day before meals  insulin lispro (HumaLOG) corrective regimen sliding scale  SubCutaneous three times a day before meals  insulin lispro (HumaLOG) corrective regimen sliding scale  SubCutaneous at bedtime  dextrose 5%. 1000milliLiter(s) IV Continuous <Continuous>    MEDICATIONS  (PRN):  oxyCODONE  5 mG/acetaminophen 325 mG 1Tablet(s) Oral every 6 hours PRN Mild Pain (1 - 3)  oxyCODONE  5 mG/acetaminophen 325 mG 2Tablet(s) Oral every 6 hours PRN Moderate Pain (4 - 6)  dextrose Gel 1Dose(s) Oral once PRN Blood Glucose LESS THAN 70 milliGRAM(s)/deciliter  glucagon  Injectable 1milliGRAM(s) IntraMuscular once PRN Glucose LESS THAN 70 milligrams/deciliter      ASSESSMENT / PLAN:  -----------------------------------  Pneumonia - On cefepime. Afebrile. Sputum culture with MSSA. Oxygen saturation 93% on ambient air at rest.    Non-small cell lung cancer - Oncology consultation noted. For further treatment after evaluation as an outpatient.    Pleural effusion - No surgical intervention planned as per Thoracic Surgery.    Diabetes - Insulin coverage, close monitoring of blood glucose levels.    Hypertension - Close blood pressure monitoring.

## 2017-06-24 NOTE — PROGRESS NOTE ADULT - SUBJECTIVE AND OBJECTIVE BOX
Renal :        Chief Complaint: Patient is a 67y old  Male who presents with a chief complaint of Dyspnea and cough (2017 19:25)      PAST MEDICAL & SURGICAL HISTORY:    Non-small cell lung cancer, unspecified laterality  Hepatitis C: diagnosed a few years ago as per pt no treatment. iv drug use as teenager  Lung nodule  Hypertension  DM (diabetes mellitus)  HTN (hypertension)  Portacath in place:         HPI /OVERNIGHT EVENTS: None,    MEDICATIONS  (STANDING):  heparin  Injectable 5000Unit(s) SubCutaneous every 12 hours  cefepime  IVPB 1000milliGRAM(s) IV Intermittent every 12 hours  pantoprazole    Tablet 40milliGRAM(s) Oral before breakfast  metoclopramide 10milliGRAM(s) Oral every 8 hours  dicyclomine 10milliGRAM(s) Oral three times a day before meals  metoprolol succinate ER 50milliGRAM(s) Oral daily  sodium bicarbonate 650milliGRAM(s) Oral three times a day    ICU Vital Signs Last 24 Hrs  T(C): 36.8, Max: 37.4 ( @ 20:00)  T(F): 98.2, Max: 99.3 (- @ 20:00)  HR: 87 (87 - 107)  BP: 128/64 (113/57 - 162/78)  BP(mean): 89 (77 - 104)  ABP: --  ABP(mean): --  RR: 21 (20 - 28)  SpO2: 99% (91% - 100%)      PHYSICAL EXAM:    Constitutional: NAD, well-groomed, well-developed  HEENT: PERRLA, EOMI, Normal Hearing, MMM  Neck: No LAD, No JVD  Back: Normal spine flexure, No CVA tenderness  Respiratory: CTAB Cardiovascular: S1 and S2, RRR, no M/G/R  Gastrointestinal: BS+, soft, NT/ND  Extremities: No peripheral edema  Vascular: 2+ peripheral pulses  Neurological: A/O x 3, no focal deficits  Psychiatric: Normal mood, normal affect  Musculoskeletal: 5/5 strength b/l upper and lower extremities  Skin: No rashes,      139    |  100    |  44.0<H>  ----------------------------<  150<H>  Ca:8.6   (2017 05:44)  4.1     |  22.0   |  0.75       eGFR if : 112                               11.7<L>  18.7<H> )-----------( 182      ( 2017 05:44 )                35.0<L>    Phos:3.0 mg/dL M.2 mg/dL PTH:-- Uric acid:-- Serum Osm:--  Ferritin:-- Iron:-- TIBC:-- Tsat:--  B12:0.02 uIU/mL<L> TSH:-- ( @ 05:44)    Urinalysis Basic - ( 2017 19:50 )  Color: Yellow / Appearance: Clear / S.020 / pH: x  Gluc: x / Ketone: Negative  / Bili: Negative / Urobili: Negative mg/dL   Blood: x / Protein: 15 mg/dL<!> / Nitrite: Negative   Leuk Esterase: Negative / RBC: 0-2 /HPF / WBC 3-5   Sq Epi: x / Non Sq Epi: Few / Bacteria: Few<!>        TEO  Resolved,    Will no longer routinely Follow,    Please call as needed,    Thank you,

## 2017-06-24 NOTE — PROGRESS NOTE ADULT - PROBLEM SELECTOR PLAN 2
- hopes to get off of insulin infusion today, AM lantus given ~ 0800, will trial off before lunch and increase premeal to 10 Units.   - continue with reglan and bentyl (component of opioid induced constipation as well, but + BMs now)
Diabetic management per primary team
Diabetic management per primary team
Follow up with Dr Ramsay ONC  Increasing tumor burden
- started insulin infusion  - could be contributing to his abdominal discomfort. started reglan and bentyl (component of opioid induced constipation as well, but + BMs now)
-renal is following  -on 0.45% saline with bicarb for metabolic acidosis  -renal dose medications  -monitor urine output
convert to insulin lantus and SSI and mealtime
Diabetic management per primary team

## 2017-06-24 NOTE — PROGRESS NOTE ADULT - PROBLEM SELECTOR PROBLEM 3
Non-small cell lung cancer, unspecified laterality
Acute on chronic respiratory failure with hypoxia
Non-small cell lung cancer, unspecified laterality
Non-small cell lung cancer, unspecified laterality
Acute on chronic kidney failure
Acute on chronic respiratory failure with hypoxia
Pneumonia, bacterial
TEO (acute kidney injury)

## 2017-06-24 NOTE — PROGRESS NOTE ADULT - PROBLEM SELECTOR PROBLEM 2
Type 2 diabetes mellitus with other kidney complication
Type 2 diabetes mellitus without complication, with long-term current use of insulin
Non-small cell lung cancer, unspecified laterality
Acute on chronic kidney failure
Type 2 diabetes mellitus with other kidney complication
Type 2 diabetes mellitus without complication, with long-term current use of insulin

## 2017-06-25 LAB
CULTURE RESULTS: SIGNIFICANT CHANGE UP
CULTURE RESULTS: SIGNIFICANT CHANGE UP
SPECIMEN SOURCE: SIGNIFICANT CHANGE UP
SPECIMEN SOURCE: SIGNIFICANT CHANGE UP

## 2017-06-25 PROCEDURE — 99233 SBSQ HOSP IP/OBS HIGH 50: CPT

## 2017-06-25 RX ADMIN — Medication 10 UNIT(S): at 08:31

## 2017-06-25 RX ADMIN — HEPARIN SODIUM 5000 UNIT(S): 5000 INJECTION INTRAVENOUS; SUBCUTANEOUS at 17:20

## 2017-06-25 RX ADMIN — Medication 10 UNIT(S): at 17:20

## 2017-06-25 RX ADMIN — Medication 10 MILLIGRAM(S): at 05:43

## 2017-06-25 RX ADMIN — Medication 10 MILLIGRAM(S): at 22:48

## 2017-06-25 RX ADMIN — Medication 10 UNIT(S): at 12:49

## 2017-06-25 RX ADMIN — Medication 50 MILLIGRAM(S): at 05:43

## 2017-06-25 RX ADMIN — Medication 650 MILLIGRAM(S): at 05:43

## 2017-06-25 RX ADMIN — CEFEPIME 100 MILLIGRAM(S): 1 INJECTION, POWDER, FOR SOLUTION INTRAMUSCULAR; INTRAVENOUS at 05:53

## 2017-06-25 RX ADMIN — CEFEPIME 100 MILLIGRAM(S): 1 INJECTION, POWDER, FOR SOLUTION INTRAMUSCULAR; INTRAVENOUS at 19:34

## 2017-06-25 RX ADMIN — PANTOPRAZOLE SODIUM 40 MILLIGRAM(S): 20 TABLET, DELAYED RELEASE ORAL at 05:44

## 2017-06-25 RX ADMIN — Medication 10 MILLIGRAM(S): at 13:59

## 2017-06-25 RX ADMIN — Medication 2: at 17:20

## 2017-06-25 RX ADMIN — INSULIN GLARGINE 40 UNIT(S): 100 INJECTION, SOLUTION SUBCUTANEOUS at 22:48

## 2017-06-25 RX ADMIN — Medication 650 MILLIGRAM(S): at 22:48

## 2017-06-25 RX ADMIN — Medication 650 MILLIGRAM(S): at 13:59

## 2017-06-25 RX ADMIN — HEPARIN SODIUM 5000 UNIT(S): 5000 INJECTION INTRAVENOUS; SUBCUTANEOUS at 05:43

## 2017-06-25 NOTE — PHYSICAL THERAPY INITIAL EVALUATION ADULT - PERTINENT HX OF CURRENT PROBLEM, REHAB EVAL
67M with a h/o DM, HTN, Hep C, former IVDA, with NSC Lung Ca, currently on Chemo for the last 2 years. had Chemo today, it was stopped because he didnt feel well. and he was sent to the ER. he was found to be diaphoretic, and in respiratory distress with O2 sats that were in the low 90s. Initial Labs revealed a WBC 21K, K 6.8,lacate of 2.1, BUN/Crt 52/2.2 and a blood glucose of 544.  His CXR revealed a New large LLL infiltrate.

## 2017-06-25 NOTE — PHYSICAL THERAPY INITIAL EVALUATION ADULT - CRITERIA FOR SKILLED THERAPEUTIC INTERVENTIONS
anticipated equipment needs at discharge/therapy frequency/risk reduction/prevention/impairments found/anticipated discharge recommendation/functional limitations in following categories/rehab potential

## 2017-06-25 NOTE — PROGRESS NOTE ADULT - SUBJECTIVE AND OBJECTIVE BOX
PATO BUSCH   ------------------------------  The patient was seen and evaluated for pneumonia.  The patient is in no acute distress.  Denied any chest pain, palpitations, shortness of breath, or abdominal pain.  Feels weak.    Vital Signs Last 24 Hrs  T(C): 36.4, Max: 37.9 (06-24 @ 23:00)  T(F): 97.5, Max: 100.2 (06-24 @ 23:00)  HR: 88 (84 - 113)  BP: 145/78 (131/83 - 159/82)  BP(mean): 100 (95 - 112)  RR: 20 (20 - 65)  SpO2: 96% (61% - 97%)    PHYSICAL EXAMINATION:  ----------------------------------------  General appearance: NAD, Awake, Alert  HEENT: NCAT, Conjunctiva clear, EOMI  Neck: Supple, No JVD  Lungs: Clear to auscultation, Breath sound equal bilaterally, No wheezes, No rales  Cardiovascular: S1S2, Regular rhythm  Abdomen: Soft, Nontender, Nondistended, No guarding/rebound, Positive bowel sounds  Extremities: No clubbing, No cyanosis, No edema, No calf tenderness  Neuro: Strength equal bilaterally, No tremors  Psychiatric: Appropriate mood, Normal affect    CAPILLARY BLOOD GLUCOSE  140 (25 Jun 2017 12:50)  121 (25 Jun 2017 08:33)  162 (24 Jun 2017 21:00)    LABS:  ------------------------------             11.7   18.7  )-----------( 182      ( 24 Jun 2017 05:44 )             35.0     06-24    139  |  100  |  44.0<H>  ----------------------------<  150<H>  4.1   |  22.0  |  0.75    Ca    8.6      24 Jun 2017 05:44  Phos  3.0     06-24  Mg     2.2     06-24    RECENT CULTURES:  06-21 .Blood Blood XXXX XXXX   No growth at 48 hours    06-21 .Sputum Sputum Staphylococcus aureus   No White blood cells  Few Gram positive cocci in pairs   Moderate Staphylococcus aureus  No Routine respiratory karuna present    06-20 .Urine Clean Catch (Midstream) XXXX XXXX   No growth    MEDICATIONS  (STANDING):  heparin  Injectable 5000Unit(s) SubCutaneous every 12 hours  dextrose 50% Injectable 50milliLiter(s) IV Push every 15 minutes  cefepime  IVPB 1000milliGRAM(s) IV Intermittent every 12 hours  cefepime  IVPB     pantoprazole    Tablet 40milliGRAM(s) Oral before breakfast  metoclopramide 10milliGRAM(s) Oral every 8 hours  metoprolol succinate ER 50milliGRAM(s) Oral daily  sodium bicarbonate 650milliGRAM(s) Oral three times a day  insulin glargine Injectable (LANTUS) 40Unit(s) SubCutaneous at bedtime  insulin lispro Injectable (HumaLOG) 10Unit(s) SubCutaneous three times a day before meals  insulin lispro (HumaLOG) corrective regimen sliding scale  SubCutaneous three times a day before meals  insulin lispro (HumaLOG) corrective regimen sliding scale  SubCutaneous at bedtime  dextrose 5%. 1000milliLiter(s) IV Continuous <Continuous>    MEDICATIONS  (PRN):  oxyCODONE  5 mG/acetaminophen 325 mG 1Tablet(s) Oral every 6 hours PRN Mild Pain (1 - 3)  oxyCODONE  5 mG/acetaminophen 325 mG 2Tablet(s) Oral every 6 hours PRN Moderate Pain (4 - 6)  dextrose Gel 1Dose(s) Oral once PRN Blood Glucose LESS THAN 70 milliGRAM(s)/deciliter  glucagon  Injectable 1milliGRAM(s) IntraMuscular once PRN Glucose LESS THAN 70 milligrams/deciliter      ASSESSMENT / PLAN:  -----------------------------------  Pneumonia - Afebrile on cefepime. Sputum culture with MSSA. Oxygen saturation 97% on ambient air at rest.    Non-small cell lung cancer - For further treatment and follow up as outpatient with Oncology.    Pleural effusion - No surgical intervention planned as per Thoracic Surgery.    Diabetes - Insulin coverage, close monitoring of blood glucose levels.    Hypertension - Close blood pressure monitoring.

## 2017-06-25 NOTE — PHYSICAL THERAPY INITIAL EVALUATION ADULT - ADDITIONAL COMMENTS
pt states he lives in a rented room in a house with 4 steps to enter (no rail) then 12 to second floor with a rail. independent prior to admit. no DME.

## 2017-06-25 NOTE — PHYSICAL THERAPY INITIAL EVALUATION ADULT - MANUAL MUSCLE TESTING RESULTS, REHAB EVAL
bilateral shoulder flex 4-/5, elbow flex 4/5, hip flex 4-/5, knee ext 4/5, knee flex 2+/5, ankle df 4+/5

## 2017-06-25 NOTE — PHYSICAL THERAPY INITIAL EVALUATION ADULT - RANGE OF MOTION EXAMINATION, REHAB EVAL
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

## 2017-06-26 LAB
HCT VFR BLD CALC: 33.4 % — LOW (ref 42–52)
HGB BLD-MCNC: 10.8 G/DL — LOW (ref 14–18)
MCHC RBC-ENTMCNC: 24.7 PG — LOW (ref 27–31)
MCHC RBC-ENTMCNC: 32.3 G/DL — SIGNIFICANT CHANGE UP (ref 32–36)
MCV RBC AUTO: 76.4 FL — LOW (ref 80–94)
PLATELET # BLD AUTO: 210 K/UL — SIGNIFICANT CHANGE UP (ref 150–400)
RBC # BLD: 4.37 M/UL — LOW (ref 4.6–6.2)
RBC # FLD: 15.9 % — HIGH (ref 11–15.6)
WBC # BLD: 16.4 K/UL — HIGH (ref 4.8–10.8)
WBC # FLD AUTO: 16.4 K/UL — HIGH (ref 4.8–10.8)

## 2017-06-26 PROCEDURE — 99233 SBSQ HOSP IP/OBS HIGH 50: CPT

## 2017-06-26 RX ADMIN — CEFEPIME 100 MILLIGRAM(S): 1 INJECTION, POWDER, FOR SOLUTION INTRAMUSCULAR; INTRAVENOUS at 05:42

## 2017-06-26 RX ADMIN — Medication 10 MILLIGRAM(S): at 12:49

## 2017-06-26 RX ADMIN — Medication 10 MILLIGRAM(S): at 05:35

## 2017-06-26 RX ADMIN — Medication 10 MILLIGRAM(S): at 22:24

## 2017-06-26 RX ADMIN — PANTOPRAZOLE SODIUM 40 MILLIGRAM(S): 20 TABLET, DELAYED RELEASE ORAL at 05:36

## 2017-06-26 RX ADMIN — Medication 650 MILLIGRAM(S): at 05:35

## 2017-06-26 RX ADMIN — Medication 650 MILLIGRAM(S): at 22:25

## 2017-06-26 RX ADMIN — HEPARIN SODIUM 5000 UNIT(S): 5000 INJECTION INTRAVENOUS; SUBCUTANEOUS at 05:36

## 2017-06-26 RX ADMIN — Medication 10 UNIT(S): at 12:50

## 2017-06-26 RX ADMIN — INSULIN GLARGINE 40 UNIT(S): 100 INJECTION, SOLUTION SUBCUTANEOUS at 22:23

## 2017-06-26 RX ADMIN — CEFEPIME 100 MILLIGRAM(S): 1 INJECTION, POWDER, FOR SOLUTION INTRAMUSCULAR; INTRAVENOUS at 18:44

## 2017-06-26 RX ADMIN — Medication 10 UNIT(S): at 18:47

## 2017-06-26 RX ADMIN — Medication 2: at 12:49

## 2017-06-26 RX ADMIN — Medication 650 MILLIGRAM(S): at 12:49

## 2017-06-26 RX ADMIN — Medication 10 UNIT(S): at 08:29

## 2017-06-26 RX ADMIN — HEPARIN SODIUM 5000 UNIT(S): 5000 INJECTION INTRAVENOUS; SUBCUTANEOUS at 18:47

## 2017-06-26 RX ADMIN — Medication 50 MILLIGRAM(S): at 05:35

## 2017-06-26 NOTE — PROGRESS NOTE ADULT - SUBJECTIVE AND OBJECTIVE BOX
PATO BUSCH   ------------------------------  The patient was seen and evaluated for pneumonia.  The patient is in no acute distress.  Denied any chest pain, palpitations, or abdominal pain.  Reports some dyspnea with exertion.    Vital Signs Last 24 Hrs  T(C): 36.4, Max: 37.2 (06-25 @ 23:35)  T(F): 97.6, Max: 98.9 (06-25 @ 23:35)  HR: 84 (84 - 101)  BP: 136/78 (136/78 - 174/90)  BP(mean): --  RR: 18 (18 - 18)  SpO2: 96% (94% - 96%)    PHYSICAL EXAMINATION:  ----------------------------------------  General appearance: NAD, Awake, Alert  HEENT: NCAT, Conjunctiva clear, EOMI  Neck: Supple, No JVD  Lungs: Clear to auscultation, Breath sound equal bilaterally, No wheezes, No rales  Cardiovascular: S1S2, Regular rhythm  Abdomen: Soft, Nontender, Nondistended, No guarding/rebound, Positive bowel sounds  Extremities: No clubbing, No cyanosis, No edema, No calf tenderness  Neuro: Strength equal bilaterally, No tremors  Psychiatric: Appropriate mood, Normal affect    CAPILLARY BLOOD GLUCOSE  135 (26 Jun 2017 08:27)  125 (25 Jun 2017 22:48)  140 (25 Jun 2017 12:50)    LABS:  ------------------------------             10.8   16.4  )-----------( 210      ( 26 Jun 2017 07:52 )             33.4     RECENT CULTURES:  06-21 .Blood Blood XXXX XXXX   No growth at 5 days.    06-21 .Sputum Sputum Staphylococcus aureus   No White blood cells  Few Gram positive cocci in pairs   Moderate Staphylococcus aureus  No Routine respiratory karuna present    06-20 .Urine Clean Catch (Midstream) XXXX XXXX   No growth    MEDICATIONS  (STANDING):  heparin  Injectable 5000Unit(s) SubCutaneous every 12 hours  dextrose 50% Injectable 50milliLiter(s) IV Push every 15 minutes  cefepime  IVPB 1000milliGRAM(s) IV Intermittent every 12 hours  cefepime  IVPB     pantoprazole    Tablet 40milliGRAM(s) Oral before breakfast  metoclopramide 10milliGRAM(s) Oral every 8 hours  metoprolol succinate ER 50milliGRAM(s) Oral daily  sodium bicarbonate 650milliGRAM(s) Oral three times a day  insulin glargine Injectable (LANTUS) 40Unit(s) SubCutaneous at bedtime  insulin lispro Injectable (HumaLOG) 10Unit(s) SubCutaneous three times a day before meals  insulin lispro (HumaLOG) corrective regimen sliding scale  SubCutaneous three times a day before meals  insulin lispro (HumaLOG) corrective regimen sliding scale  SubCutaneous at bedtime  dextrose 5%. 1000milliLiter(s) IV Continuous <Continuous>    MEDICATIONS  (PRN):  oxyCODONE  5 mG/acetaminophen 325 mG 1Tablet(s) Oral every 6 hours PRN Mild Pain (1 - 3)  oxyCODONE  5 mG/acetaminophen 325 mG 2Tablet(s) Oral every 6 hours PRN Moderate Pain (4 - 6)  dextrose Gel 1Dose(s) Oral once PRN Blood Glucose LESS THAN 70 milliGRAM(s)/deciliter  glucagon  Injectable 1milliGRAM(s) IntraMuscular once PRN Glucose LESS THAN 70 milligrams/deciliter      ASSESSMENT / PLAN:  -----------------------------------  Pneumonia - Afebrile with improved leukocytosis. Sputum culture with MSSA. On cefepime, to complete the course of antibiotics today.    Non-small cell lung cancer - For further treatment and follow up as outpatient with Oncology.    Pleural effusion - Oxygen saturation 96% on ambient air at rest. No surgical intervention planned as per Thoracic Surgery.    Diabetes - Insulin coverage, close monitoring of blood glucose levels.    Hypertension - Close blood pressure monitoring.    Disposition pending Physical Therapy.

## 2017-06-27 PROCEDURE — 99233 SBSQ HOSP IP/OBS HIGH 50: CPT

## 2017-06-27 RX ORDER — METOPROLOL TARTRATE 50 MG
1 TABLET ORAL
Qty: 0 | Refills: 0 | COMMUNITY

## 2017-06-27 RX ORDER — INSULIN ASPART 100 [IU]/ML
15 INJECTION, SOLUTION SUBCUTANEOUS
Qty: 0 | Refills: 0 | COMMUNITY

## 2017-06-27 RX ADMIN — Medication 10 UNIT(S): at 12:47

## 2017-06-27 RX ADMIN — INSULIN GLARGINE 40 UNIT(S): 100 INJECTION, SOLUTION SUBCUTANEOUS at 22:39

## 2017-06-27 RX ADMIN — Medication 10 MILLIGRAM(S): at 05:26

## 2017-06-27 RX ADMIN — Medication 2: at 09:41

## 2017-06-27 RX ADMIN — HEPARIN SODIUM 5000 UNIT(S): 5000 INJECTION INTRAVENOUS; SUBCUTANEOUS at 17:23

## 2017-06-27 RX ADMIN — Medication 2: at 12:46

## 2017-06-27 RX ADMIN — Medication 650 MILLIGRAM(S): at 05:26

## 2017-06-27 RX ADMIN — Medication 10 UNIT(S): at 09:41

## 2017-06-27 RX ADMIN — Medication 650 MILLIGRAM(S): at 17:18

## 2017-06-27 RX ADMIN — Medication 650 MILLIGRAM(S): at 22:39

## 2017-06-27 RX ADMIN — HEPARIN SODIUM 5000 UNIT(S): 5000 INJECTION INTRAVENOUS; SUBCUTANEOUS at 05:28

## 2017-06-27 RX ADMIN — Medication 10 MILLIGRAM(S): at 22:39

## 2017-06-27 RX ADMIN — Medication 10 UNIT(S): at 17:23

## 2017-06-27 RX ADMIN — Medication 50 MILLIGRAM(S): at 05:26

## 2017-06-27 RX ADMIN — Medication 10 MILLIGRAM(S): at 17:18

## 2017-06-27 RX ADMIN — PANTOPRAZOLE SODIUM 40 MILLIGRAM(S): 20 TABLET, DELAYED RELEASE ORAL at 05:26

## 2017-06-27 NOTE — PROGRESS NOTE ADULT - SUBJECTIVE AND OBJECTIVE BOX
PATO BUSCH   ------------------------------  The patient was seen and evaluated for pneumonia.  The patient is in no acute distress.  Denied any chest pain, palpitations, or abdominal pain.  Some dyspnea with exertion.    Vital Signs Last 24 Hrs  T(C): 36.6 (27 Jun 2017 08:00), Max: 36.8 (26 Jun 2017 17:04)  T(F): 97.9 (27 Jun 2017 08:00), Max: 98.3 (26 Jun 2017 17:04)  HR: 81 (27 Jun 2017 08:00) (81 - 99)  BP: 141/82 (27 Jun 2017 08:00) (129/78 - 146/78)  BP(mean): --  RR: 20 (27 Jun 2017 08:00) (18 - 20)  SpO2: 96% (27 Jun 2017 08:00) (94% - 100%)    PHYSICAL EXAMINATION:  ----------------------------------------  General appearance: NAD, Awake, Alert  HEENT: NCAT, Conjunctiva clear, EOMI  Neck: Supple, No JVD  Lungs: Clear to auscultation, Breath sound equal bilaterally, No wheezes, No rales  Cardiovascular: S1S2, Regular rhythm  Abdomen: Soft, Nontender, Nondistended, No guarding/rebound, Positive bowel sounds  Extremities: No clubbing, No cyanosis, No edema, No calf tenderness  Neuro: Strength equal bilaterally, No tremors  Psychiatric: Appropriate mood, Normal affect    CAPILLARY BLOOD GLUCOSE  176 (27 Jun 2017 11:25)  172 (27 Jun 2017 07:45)  136 (26 Jun 2017 22:22)    LABS:  ------------------------------             10.8   16.4  )-----------( 210      ( 26 Jun 2017 07:52 )             33.4     MEDICATIONS  (STANDING):  heparin  Injectable 5000 Unit(s) SubCutaneous every 12 hours  dextrose 50% Injectable 50 milliLiter(s) IV Push every 15 minutes  pantoprazole    Tablet 40 milliGRAM(s) Oral before breakfast  metoclopramide 10 milliGRAM(s) Oral every 8 hours  metoprolol succinate ER 50 milliGRAM(s) Oral daily  sodium bicarbonate 650 milliGRAM(s) Oral three times a day  insulin glargine Injectable (LANTUS) 40 Unit(s) SubCutaneous at bedtime  insulin lispro Injectable (HumaLOG) 10 Unit(s) SubCutaneous three times a day before meals  insulin lispro (HumaLOG) corrective regimen sliding scale   SubCutaneous three times a day before meals  insulin lispro (HumaLOG) corrective regimen sliding scale   SubCutaneous at bedtime  dextrose 5%. 1000 milliLiter(s) (50 mL/Hr) IV Continuous <Continuous>    MEDICATIONS  (PRN):  oxyCODONE  5 mG/acetaminophen 325 mG 1 Tablet(s) Oral every 6 hours PRN Mild Pain (1 - 3)  oxyCODONE  5 mG/acetaminophen 325 mG 2 Tablet(s) Oral every 6 hours PRN Moderate Pain (4 - 6)  dextrose Gel 1 Dose(s) Oral once PRN Blood Glucose LESS THAN 70 milliGRAM(s)/deciliter  glucagon  Injectable 1 milliGRAM(s) IntraMuscular once PRN Glucose LESS THAN 70 milligrams/deciliter      ASSESSMENT / PLAN:  -----------------------------------  Pneumonia - Completed the course of antibiotics. Afebrile. Supplemental oxygen for hypoxia, titrate as tolerated.    Non-small cell lung cancer - Oncology follow up noted. For further treatment and follow up as outpatient.    Pleural effusion - No surgical intervention planned as per Thoracic Surgery.    Diabetes - Insulin coverage, close monitoring of blood glucose levels.    Hypertension - Close blood pressure monitoring.

## 2017-06-27 NOTE — PROGRESS NOTE ADULT - SUBJECTIVE AND OBJECTIVE BOX
Hematology Oncology Progress Note    The patient was seen and examined at bedside.    PATO BUSCH is a 67y Male with history of HEME/0NC DISEASE admitted with Shortness of breath.  He is followed in our office for NSCLC, currently getting nivolumab.  He had a mixed response on most recent imaging, but we had decided to continue the current management, when he got admitted with SOB.    Interval History:  He is feeling better now, but still with SOB.    ROS   As per HPI, otherwise negative in detail      PAST MEDICAL & SURGICAL HISTORY:  Non-small cell lung cancer, unspecified laterality  Hepatitis C: diagnosed a few years ago as per pt no treatment. iv drug use as teenager  Cough  Lung nodule  High cholesterol  Hypertension  Diabetes  DM (diabetes mellitus)  HTN (hypertension)  Portacath in place: 2016      Allergies:  No Known Allergies      Medications:  heparin  Injectable 5000 Unit(s) SubCutaneous every 12 hours  dextrose 50% Injectable 50 milliLiter(s) IV Push every 15 minutes  pantoprazole    Tablet 40 milliGRAM(s) Oral before breakfast  oxyCODONE  5 mG/acetaminophen 325 mG 1 Tablet(s) Oral every 6 hours PRN  oxyCODONE  5 mG/acetaminophen 325 mG 2 Tablet(s) Oral every 6 hours PRN  metoclopramide 10 milliGRAM(s) Oral every 8 hours  metoprolol succinate ER 50 milliGRAM(s) Oral daily  sodium bicarbonate 650 milliGRAM(s) Oral three times a day  insulin glargine Injectable (LANTUS) 40 Unit(s) SubCutaneous at bedtime  insulin lispro Injectable (HumaLOG) 10 Unit(s) SubCutaneous three times a day before meals  insulin lispro (HumaLOG) corrective regimen sliding scale   SubCutaneous three times a day before meals  insulin lispro (HumaLOG) corrective regimen sliding scale   SubCutaneous at bedtime  dextrose 5%. 1000 milliLiter(s) IV Continuous <Continuous>  dextrose Gel 1 Dose(s) Oral once PRN  glucagon  Injectable 1 milliGRAM(s) IntraMuscular once PRN      Social History:    FAMILY HISTORY:  Maternal family history of cancer (Mother): pt not sure of type      PHYSICAL EXAM:    T(F): 97.9 (06-27-17 @ 08:00), Max: 98.3 (06-26-17 @ 17:04)  HR: 81 (06-27-17 @ 08:00) (81 - 99)  BP: 141/82 (06-27-17 @ 08:00) (129/78 - 146/78)  RR: 20 (06-27-17 @ 08:00) (18 - 20)  SpO2: 96% (06-27-17 @ 08:00) (94% - 100%)  Wt(kg): --    Daily     Daily     Gen: well developed, well nourished, comfortable  HEENT: normocephalic/atraumatic, no conjunctival pallor, no scleral icterus, no oral thrush/mucosal bleeding/mucositis  Neck: supple, no masses, no JVD  Cardiovascular: RR, nl S1S2, no murmurs/rubs/gallops  Respiratory: clear air entry b/l, on nasal canula  Gastrointestinal: BS+, soft, NT/ND, no masses, no splenomegaly, no hepatomegaly, no evidence for ascites  Extremities: no clubbing/cyanosis, no edema, no calf tenderness  Neurological: no focal deficits  Skin: no rash on visible skin, excessive ecchymoses or petechiae  Lymph Nodes:  no significant peripheral adenopathy   Musculoskeletal:  full ROM  Psychiatric:  mood stable            Labs:                          10.8   16.4  )-----------( 210      ( 26 Jun 2017 07:52 )             33.4     CBC Full  -  ( 26 Jun 2017 07:52 )  WBC Count : 16.4 K/uL  Hemoglobin : 10.8 g/dL  Hematocrit : 33.4 %  Platelet Count - Automated : 210 K/uL  Mean Cell Volume : 76.4 fl  Mean Cell Hemoglobin : 24.7 pg  Mean Cell Hemoglobin Concentration : 32.3 g/dL  Auto Neutrophil # : x  Auto Lymphocyte # : x  Auto Monocyte # : x  Auto Eosinophil # : x  Auto Basophil # : x  Auto Neutrophil % : x  Auto Lymphocyte % : x  Auto Monocyte % : x  Auto Eosinophil % : x  Auto Basophil % : x                    Other Labs:    Cultures:    Pathology:    Imaging Studies:      Images:

## 2017-06-27 NOTE — PROGRESS NOTE ADULT - ASSESSMENT
Mr. Rocha is a very pleasant 66 yo M with stage IV lung cancer on second line nivolumab, with mixed response on most recent PET scan, who presents with SOB and abdominal pain.  He was found to be in acute renal failure which resolved.  Also with hyperglycemia, s/p insulin drip.  On O2 nasal canula for respiratory distress.  S/p abx for PNA.    Chemotherapy is on hold while he is in the hospital.    We will resume treatment as an out patient.

## 2017-06-28 PROCEDURE — 99233 SBSQ HOSP IP/OBS HIGH 50: CPT

## 2017-06-28 RX ORDER — ACETAMINOPHEN 500 MG
650 TABLET ORAL EVERY 6 HOURS
Qty: 0 | Refills: 0 | Status: DISCONTINUED | OUTPATIENT
Start: 2017-06-28 | End: 2017-06-20

## 2017-06-28 RX ADMIN — INSULIN GLARGINE 40 UNIT(S): 100 INJECTION, SOLUTION SUBCUTANEOUS at 21:51

## 2017-06-28 RX ADMIN — Medication 650 MILLIGRAM(S): at 13:43

## 2017-06-28 RX ADMIN — Medication 650 MILLIGRAM(S): at 21:51

## 2017-06-28 RX ADMIN — HEPARIN SODIUM 5000 UNIT(S): 5000 INJECTION INTRAVENOUS; SUBCUTANEOUS at 05:56

## 2017-06-28 RX ADMIN — Medication 50 MILLIGRAM(S): at 05:56

## 2017-06-28 RX ADMIN — Medication 10 MILLIGRAM(S): at 05:56

## 2017-06-28 RX ADMIN — Medication 10 UNIT(S): at 17:24

## 2017-06-28 RX ADMIN — Medication 650 MILLIGRAM(S): at 05:56

## 2017-06-28 RX ADMIN — HEPARIN SODIUM 5000 UNIT(S): 5000 INJECTION INTRAVENOUS; SUBCUTANEOUS at 17:21

## 2017-06-28 RX ADMIN — Medication 10 MILLIGRAM(S): at 13:43

## 2017-06-28 RX ADMIN — Medication 10 MILLIGRAM(S): at 21:51

## 2017-06-28 RX ADMIN — Medication 10 UNIT(S): at 12:31

## 2017-06-28 RX ADMIN — PANTOPRAZOLE SODIUM 40 MILLIGRAM(S): 20 TABLET, DELAYED RELEASE ORAL at 05:56

## 2017-06-28 RX ADMIN — Medication 10 UNIT(S): at 08:27

## 2017-06-28 NOTE — PROGRESS NOTE ADULT - SUBJECTIVE AND OBJECTIVE BOX
PATO BUSCH   ------------------------------  The patient was seen and evaluated for pneumonia.  The patient is in no acute distress.  Denied any chest pain, palpitations, or abdominal pain.  Reports some improvement in dyspnea.    Vital Signs Last 24 Hrs  T(C): 36.8 (28 Jun 2017 07:25), Max: 37.9 (28 Jun 2017 04:30)  T(F): 98.2 (28 Jun 2017 07:25), Max: 100.2 (28 Jun 2017 04:30)  HR: 84 (28 Jun 2017 07:25) (84 - 95)  BP: 136/78 (28 Jun 2017 07:25) (131/72 - 151/81)  BP(mean): --  RR: 18 (28 Jun 2017 07:25) (18 - 20)  SpO2: 94% (28 Jun 2017 07:25) (94% - 99%)    PHYSICAL EXAMINATION:  ----------------------------------------  General appearance: NAD, Awake, Alert  HEENT: NCAT, Conjunctiva clear, EOMI  Neck: Supple, No JVD  Lungs: Clear to auscultation, Breath sound equal bilaterally, No wheezes, No rales  Cardiovascular: S1S2, Regular rhythm  Abdomen: Soft, Nontender, Nondistended, No guarding/rebound, Positive bowel sounds  Extremities: No clubbing, No cyanosis, No edema, No calf tenderness  Neuro: Strength equal bilaterally, No tremors  Psychiatric: Appropriate mood, Normal affect    CAPILLARY BLOOD GLUCOSE  116 (28 Jun 2017 12:25)  141 (28 Jun 2017 08:25)  103 (27 Jun 2017 22:00)    MEDICATIONS  (STANDING):  heparin  Injectable 5000 Unit(s) SubCutaneous every 12 hours  dextrose 50% Injectable 50 milliLiter(s) IV Push every 15 minutes  pantoprazole    Tablet 40 milliGRAM(s) Oral before breakfast  metoclopramide 10 milliGRAM(s) Oral every 8 hours  metoprolol succinate ER 50 milliGRAM(s) Oral daily  sodium bicarbonate 650 milliGRAM(s) Oral three times a day  insulin glargine Injectable (LANTUS) 40 Unit(s) SubCutaneous at bedtime  insulin lispro Injectable (HumaLOG) 10 Unit(s) SubCutaneous three times a day before meals  insulin lispro (HumaLOG) corrective regimen sliding scale   SubCutaneous three times a day before meals  insulin lispro (HumaLOG) corrective regimen sliding scale   SubCutaneous at bedtime  dextrose 5%. 1000 milliLiter(s) (50 mL/Hr) IV Continuous <Continuous>    MEDICATIONS  (PRN):  dextrose Gel 1 Dose(s) Oral once PRN Blood Glucose LESS THAN 70 milliGRAM(s)/deciliter  glucagon  Injectable 1 milliGRAM(s) IntraMuscular once PRN Glucose LESS THAN 70 milligrams/deciliter  oxyCODONE  5 mG/acetaminophen 325 mG 2 Tablet(s) Oral every 6 hours PRN Moderate Pain (4 - 6)  oxyCODONE  5 mG/acetaminophen 325 mG 1 Tablet(s) Oral every 6 hours PRN Mild Pain (1 - 3)      ASSESSMENT / PLAN:  -----------------------------------  Pneumonia - Afebrile. Completed the course of antibiotics. Supplemental oxygen for hypoxia, titrate as tolerated.    Non-small cell lung cancer - Oncology follow up noted. For further treatment and follow up as outpatient.    Pleural effusion - No surgical intervention planned as per Thoracic Surgery.    Diabetes - Insulin coverage, close monitoring of blood glucose levels.    Hypertension - Close blood pressure monitoring.    35 minutes total time

## 2017-06-29 PROCEDURE — 99233 SBSQ HOSP IP/OBS HIGH 50: CPT

## 2017-06-29 RX ADMIN — Medication 10 MILLIGRAM(S): at 21:31

## 2017-06-29 RX ADMIN — Medication 10 UNIT(S): at 18:32

## 2017-06-29 RX ADMIN — Medication 650 MILLIGRAM(S): at 12:59

## 2017-06-29 RX ADMIN — Medication 10 MILLIGRAM(S): at 12:59

## 2017-06-29 RX ADMIN — Medication 650 MILLIGRAM(S): at 21:31

## 2017-06-29 RX ADMIN — Medication 50 MILLIGRAM(S): at 06:02

## 2017-06-29 RX ADMIN — HEPARIN SODIUM 5000 UNIT(S): 5000 INJECTION INTRAVENOUS; SUBCUTANEOUS at 06:02

## 2017-06-29 RX ADMIN — Medication 10 UNIT(S): at 12:59

## 2017-06-29 RX ADMIN — HEPARIN SODIUM 5000 UNIT(S): 5000 INJECTION INTRAVENOUS; SUBCUTANEOUS at 18:33

## 2017-06-29 RX ADMIN — Medication 650 MILLIGRAM(S): at 06:02

## 2017-06-29 RX ADMIN — INSULIN GLARGINE 40 UNIT(S): 100 INJECTION, SOLUTION SUBCUTANEOUS at 21:31

## 2017-06-29 RX ADMIN — Medication 650 MILLIGRAM(S): at 00:01

## 2017-06-29 RX ADMIN — PANTOPRAZOLE SODIUM 40 MILLIGRAM(S): 20 TABLET, DELAYED RELEASE ORAL at 06:03

## 2017-06-29 RX ADMIN — Medication 10 MILLIGRAM(S): at 06:02

## 2017-06-29 RX ADMIN — Medication 2: at 12:58

## 2017-06-29 RX ADMIN — Medication 2: at 18:32

## 2017-06-29 NOTE — PROGRESS NOTE ADULT - PROVIDER SPECIALTY LIST ADULT
Critical Care
Heme/Onc
Hospitalist
Nephrology

## 2017-06-29 NOTE — PROGRESS NOTE ADULT - SUBJECTIVE AND OBJECTIVE BOX
PATO BUSCH   ------------------------------  The patient was seen and evaluated for pneumonia.  The patient is in no acute distress.  Denied any chest pain, palpitations, or abdominal pain.  Some improvement in dyspnea.    Vital Signs Last 24 Hrs  T(C): 36.9 (29 Jun 2017 07:40), Max: 38.1 (28 Jun 2017 23:34)  T(F): 98.5 (29 Jun 2017 07:40), Max: 100.5 (28 Jun 2017 23:34)  HR: 82 (29 Jun 2017 07:40) (81 - 98)  BP: 142/78 (29 Jun 2017 07:40) (104/66 - 154/78)  BP(mean): --  RR: 18 (29 Jun 2017 07:40) (18 - 20)  SpO2: 96% (29 Jun 2017 07:40) (94% - 98%)    PHYSICAL EXAMINATION:  ----------------------------------------  General appearance: NAD, Awake, Alert  HEENT: NCAT, Conjunctiva clear, EOMI  Neck: Supple, No JVD  Lungs: Clear to auscultation, Breath sound equal bilaterally, No wheezes, No rales  Cardiovascular: S1S2, Regular rhythm  Abdomen: Soft, Nontender, Nondistended, No guarding/rebound, Positive bowel sounds  Extremities: No clubbing, No cyanosis, No edema, No calf tenderness  Neuro: Strength equal bilaterally, No tremors  Psychiatric: Appropriate mood, Normal affect    CAPILLARY BLOOD GLUCOSE  201 (28 Jun 2017 21:49)  116 (28 Jun 2017 12:25)  141 (28 Jun 2017 08:25)    MEDICATIONS  (STANDING):  heparin  Injectable 5000 Unit(s) SubCutaneous every 12 hours  dextrose 50% Injectable 50 milliLiter(s) IV Push every 15 minutes  pantoprazole    Tablet 40 milliGRAM(s) Oral before breakfast  metoclopramide 10 milliGRAM(s) Oral every 8 hours  metoprolol succinate ER 50 milliGRAM(s) Oral daily  sodium bicarbonate 650 milliGRAM(s) Oral three times a day  insulin glargine Injectable (LANTUS) 40 Unit(s) SubCutaneous at bedtime  insulin lispro Injectable (HumaLOG) 10 Unit(s) SubCutaneous three times a day before meals  insulin lispro (HumaLOG) corrective regimen sliding scale   SubCutaneous three times a day before meals  insulin lispro (HumaLOG) corrective regimen sliding scale   SubCutaneous at bedtime  dextrose 5%. 1000 milliLiter(s) (50 mL/Hr) IV Continuous <Continuous>    MEDICATIONS  (PRN):  dextrose Gel 1 Dose(s) Oral once PRN Blood Glucose LESS THAN 70 milliGRAM(s)/deciliter  glucagon  Injectable 1 milliGRAM(s) IntraMuscular once PRN Glucose LESS THAN 70 milligrams/deciliter  oxyCODONE  5 mG/acetaminophen 325 mG 2 Tablet(s) Oral every 6 hours PRN Moderate Pain (4 - 6)  oxyCODONE  5 mG/acetaminophen 325 mG 1 Tablet(s) Oral every 6 hours PRN Mild Pain (1 - 3)  acetaminophen   Tablet 650 milliGRAM(s) Oral every 6 hours PRN For Temp greater than 38 C (100.4 F)      ASSESSMENT / PLAN:  -----------------------------------  Pneumonia - Previously completed the course of antibiotics. Remains afebrile. Supplemental oxygen for hypoxia, titrate as tolerated. 97% on 3 liters/minute nasal canula.    Non-small cell lung cancer - Oncology follow up noted. For further treatment and follow up as outpatient.    Pleural effusion - No surgical intervention planned as per Thoracic Surgery.    Diabetes - Insulin coverage, close monitoring of blood glucose levels.    Hypertension - Close blood pressure monitoring.

## 2017-06-29 NOTE — PROGRESS NOTE ADULT - ASSESSMENT
67M presented with dyspnea and cough. On presentation, WBC(21.8), Na(131), K(6.8), BUN/Cr(53/2.19), Anion gap(20), Gluc(541). ABG(7.34/33/91). CT of the chest noted moderate multiloculated left pleural effusion, complete atelectasis of the left lower lobe and lingual, partial atelectasis of the left upper lobe, nonspecific infrahilar opacity in the region of previous tumor within the right lower lobe measure, worsened mediastinal lymphadenopathy. The patient was admitted to the intensive care unit for further management. Intravenous fluids were initiated for acute kidney injury. The patient was seen by Nephrology in consultation. Repeat laboratory results noted improvement in the renal function and resolution of the hyperkalemia. The patient was seen by Thoracic Surgery in consultation for the pleural effusion and was continued on conservative management. The patient was seen by Oncology and was planned to resume chemotherapy on an outpatient basis. He had improvement in his dyspnea. Urine and blood cultures were without growth. Sputum culture grew MSSA. The patient was transferred to the Hospitalist service for further management. He completed a full course of antibiotics and remained afebrile.  He remained stable and was continued on supplemental oxygen for hypoxia. He did not tolerate nocturnal Bipap but continued to have improvement in his dyspnea.

## 2017-06-30 VITALS
TEMPERATURE: 99 F | SYSTOLIC BLOOD PRESSURE: 157 MMHG | DIASTOLIC BLOOD PRESSURE: 93 MMHG | HEART RATE: 75 BPM | RESPIRATION RATE: 20 BRPM | OXYGEN SATURATION: 98 %

## 2017-06-30 PROCEDURE — 83735 ASSAY OF MAGNESIUM: CPT

## 2017-06-30 PROCEDURE — 85730 THROMBOPLASTIN TIME PARTIAL: CPT

## 2017-06-30 PROCEDURE — 84132 ASSAY OF SERUM POTASSIUM: CPT

## 2017-06-30 PROCEDURE — 83036 HEMOGLOBIN GLYCOSYLATED A1C: CPT

## 2017-06-30 PROCEDURE — 36415 COLL VENOUS BLD VENIPUNCTURE: CPT

## 2017-06-30 PROCEDURE — 82435 ASSAY OF BLOOD CHLORIDE: CPT

## 2017-06-30 PROCEDURE — 87086 URINE CULTURE/COLONY COUNT: CPT

## 2017-06-30 PROCEDURE — 87040 BLOOD CULTURE FOR BACTERIA: CPT

## 2017-06-30 PROCEDURE — 94760 N-INVAS EAR/PLS OXIMETRY 1: CPT

## 2017-06-30 PROCEDURE — 97110 THERAPEUTIC EXERCISES: CPT

## 2017-06-30 PROCEDURE — 97530 THERAPEUTIC ACTIVITIES: CPT

## 2017-06-30 PROCEDURE — 74176 CT ABD & PELVIS W/O CONTRAST: CPT

## 2017-06-30 PROCEDURE — 84443 ASSAY THYROID STIM HORMONE: CPT

## 2017-06-30 PROCEDURE — 97163 PT EVAL HIGH COMPLEX 45 MIN: CPT

## 2017-06-30 PROCEDURE — 96374 THER/PROPH/DIAG INJ IV PUSH: CPT | Mod: XU

## 2017-06-30 PROCEDURE — 85014 HEMATOCRIT: CPT

## 2017-06-30 PROCEDURE — 83605 ASSAY OF LACTIC ACID: CPT

## 2017-06-30 PROCEDURE — 81001 URINALYSIS AUTO W/SCOPE: CPT

## 2017-06-30 PROCEDURE — 99291 CRITICAL CARE FIRST HOUR: CPT | Mod: 25

## 2017-06-30 PROCEDURE — 80053 COMPREHEN METABOLIC PANEL: CPT

## 2017-06-30 PROCEDURE — 96375 TX/PRO/DX INJ NEW DRUG ADDON: CPT

## 2017-06-30 PROCEDURE — 84295 ASSAY OF SERUM SODIUM: CPT

## 2017-06-30 PROCEDURE — 87186 SC STD MICRODIL/AGAR DIL: CPT

## 2017-06-30 PROCEDURE — 82330 ASSAY OF CALCIUM: CPT

## 2017-06-30 PROCEDURE — 71045 X-RAY EXAM CHEST 1 VIEW: CPT

## 2017-06-30 PROCEDURE — 84100 ASSAY OF PHOSPHORUS: CPT

## 2017-06-30 PROCEDURE — 94660 CPAP INITIATION&MGMT: CPT

## 2017-06-30 PROCEDURE — 85610 PROTHROMBIN TIME: CPT

## 2017-06-30 PROCEDURE — 85027 COMPLETE CBC AUTOMATED: CPT

## 2017-06-30 PROCEDURE — 80202 ASSAY OF VANCOMYCIN: CPT

## 2017-06-30 PROCEDURE — 99231 SBSQ HOSP IP/OBS SF/LOW 25: CPT

## 2017-06-30 PROCEDURE — 71250 CT THORAX DX C-: CPT

## 2017-06-30 PROCEDURE — 97116 GAIT TRAINING THERAPY: CPT

## 2017-06-30 PROCEDURE — 84484 ASSAY OF TROPONIN QUANT: CPT

## 2017-06-30 PROCEDURE — 80048 BASIC METABOLIC PNL TOTAL CA: CPT

## 2017-06-30 PROCEDURE — 82947 ASSAY GLUCOSE BLOOD QUANT: CPT

## 2017-06-30 PROCEDURE — 93005 ELECTROCARDIOGRAM TRACING: CPT

## 2017-06-30 PROCEDURE — 82803 BLOOD GASES ANY COMBINATION: CPT

## 2017-06-30 PROCEDURE — 87070 CULTURE OTHR SPECIMN AEROBIC: CPT

## 2017-06-30 RX ORDER — METOCLOPRAMIDE HCL 10 MG
1 TABLET ORAL
Qty: 0 | Refills: 0 | COMMUNITY
Start: 2017-06-30

## 2017-06-30 RX ADMIN — Medication 10 MILLIGRAM(S): at 11:42

## 2017-06-30 RX ADMIN — PANTOPRAZOLE SODIUM 40 MILLIGRAM(S): 20 TABLET, DELAYED RELEASE ORAL at 05:12

## 2017-06-30 RX ADMIN — Medication 10 MILLIGRAM(S): at 05:11

## 2017-06-30 RX ADMIN — Medication 50 MILLIGRAM(S): at 05:11

## 2017-06-30 RX ADMIN — Medication 10 UNIT(S): at 11:42

## 2017-06-30 RX ADMIN — Medication 650 MILLIGRAM(S): at 05:11

## 2017-06-30 RX ADMIN — Medication 2: at 11:42

## 2017-06-30 RX ADMIN — HEPARIN SODIUM 5000 UNIT(S): 5000 INJECTION INTRAVENOUS; SUBCUTANEOUS at 05:11

## 2017-06-30 RX ADMIN — Medication 650 MILLIGRAM(S): at 11:42

## 2017-06-30 NOTE — DISCHARGE NOTE ADULT - CARE PLAN
Principal Discharge DX:	Acute on chronic respiratory failure with hypoxia  Goal:	resolved with abx  Instructions for follow-up, activity and diet:	follow up with pcp  ct surgery states no intervention for loculated effusions  Secondary Diagnosis:	Diabetes  Goal:	home insulin and oral meds  Secondary Diagnosis:	Essential hypertension  Goal:	home meds  Secondary Diagnosis:	Hepatitis C  Goal:	follow upw ith pcp  Secondary Diagnosis:	High cholesterol  Goal:	home meds  Secondary Diagnosis:	Non-small cell lung cancer, unspecified laterality  Goal:	follow upw [facility] oncology  Secondary Diagnosis:	Pneumonia, bacterial  Goal:	treated with abx Principal Discharge DX:	Acute on chronic respiratory failure with hypoxia  Goal:	resolved with abx  Instructions for follow-up, activity and diet:	follow up with pcp  ct surgery states no intervention for loculated effusions  Secondary Diagnosis:	Diabetes  Goal:	home insulin and oral meds  Secondary Diagnosis:	Essential hypertension  Goal:	home meds  Secondary Diagnosis:	Hepatitis C  Goal:	follow upw ith pcp  Secondary Diagnosis:	High cholesterol  Goal:	home meds  Secondary Diagnosis:	Non-small cell lung cancer, unspecified laterality  Goal:	follow upw [facility] oncology  Secondary Diagnosis:	Pneumonia, bacterial  Goal:	treated with abx

## 2017-06-30 NOTE — DISCHARGE NOTE ADULT - MEDICATION SUMMARY - MEDICATIONS TO STOP TAKING
I will STOP taking the medications listed below when I get home from the hospital:    NovoLOG 100 units/mL subcutaneous solution  -- 5 unit(s) subcutaneous 3 times a day (before meals)    Lantus 100 units/mL subcutaneous solution  -- 28 unit(s) subcutaneous once a day

## 2017-06-30 NOTE — DISCHARGE NOTE ADULT - HOSPITAL COURSE
67M with pmh of DM, HTN, Hep C, former IVDA, with NSC Lung Ca, currently on Chemo for the last 2 years. had Chemo on day of admission and it was stopped because he didnt feel well. and he was sent to the ER. he was found to be diaphoretic, and in respiratory distress.    On presentation, WBC(21.8), Na(131), K(6.8), BUN/Cr(53/2.19), Anion gap(20), Gluc(541). ABG(7.34/33/91). CT of the chest noted moderate multiloculated left pleural effusion, complete atelectasis of the left lower lobe and lingual, partial atelectasis of the left upper lobe, nonspecific infrahilar opacity in the region of previous tumor within the right lower lobe measure, worsened mediastinal lymphadenopathy. The patient was admitted to the intensive care unit for further management. Intravenous fluids were initiated for acute kidney injury. The patient was seen by Nephrology in consultation. Repeat laboratory results noted improvement in the renal function and resolution of the hyperkalemia. The patient was seen by Thoracic Surgery in consultation for the pleural effusion and was continued on conservative management. The patient was seen by Oncology and was planned to resume chemotherapy on an outpatient basis. He had improvement in his dyspnea. Urine and blood cultures were without growth. Sputum culture grew MSSA. The patient was transferred to the Hospitalist service for further management. He completed a full course of antibiotics and remained afebrile.  He remained stable and was continued on supplemental oxygen for hypoxia. He did not tolerate nocturnal Bipap but continued to have improvement in his dyspnea.    patient evaluated by pt and recommends ranjith and awaiting placement    time spent on dc 32 minutes

## 2017-06-30 NOTE — DISCHARGE NOTE ADULT - CARE PROVIDER_API CALL
Louise Arana), Internal Medicine  [address] [place] [address]  Phone: [phone]  Fax: [phone]    Paulie Pettit), Internal Medicine  [address] [place] [address]  Phone: [phone]  Fax: [phone]

## 2017-06-30 NOTE — DISCHARGE NOTE ADULT - PATIENT PORTAL LINK FT
“You can access the Follow[employer] Patient Portal, offered by [facility], by registering with the following website: [email or web address]”

## 2017-06-30 NOTE — DISCHARGE NOTE ADULT - MEDICATION SUMMARY - MEDICATIONS TO TAKE
I will START or STAY ON the medications listed below when I get home from the hospital:    losartan 100 mg oral tablet  -- 1 tab(s) by mouth once a day  -- Indication: For htn    glimepiride 4 mg oral tablet  -- 1 tab(s) by mouth once a day  -- Indication: For diabetes    insulin glargine 100 units/mL subcutaneous solution  -- 46 unit(s) subcutaneous once a day (at bedtime)  -- Indication: For diabetes    NovoLOG FlexPen 100 units/mL subcutaneous solution  -- 15 unit(s) subcutaneous 3 times a day  -- Indication: For diabetes    metoclopramide 10 mg oral tablet  -- 1 tab(s) by mouth every 8 hours  -- Indication: For diabetes    simvastatin 20 mg oral tablet  -- 1 tab(s) by mouth once a day (at bedtime)  -- Indication: For hyperlipidemia    metoprolol succinate 100 mg oral tablet, extended release  -- 1 tab(s) by mouth once a day  -- Indication: For htn    amLODIPine 10 mg oral tablet  -- 1 tab(s) by mouth once a day  -- Indication: For htn    omeprazole 20 mg oral delayed release capsule  -- 1 cap(s) by mouth once a day  -- Indication: For Gerd

## 2017-06-30 NOTE — DISCHARGE NOTE ADULT - PLAN OF CARE
resolved with josseline follow up with pcp  ct surgery states no intervention for loculated effusions home insulin and oral meds home meds follow upw ith pcp follow upw tih oncology treated with abx

## 2017-06-30 NOTE — DISCHARGE NOTE ADULT - SECONDARY DIAGNOSIS.
Diabetes Essential hypertension High cholesterol Hepatitis C Non-small cell lung cancer, unspecified laterality Pneumonia, bacterial

## 2019-01-13 NOTE — H&P PST ADULT - CIGARETTES, NUMBER OF YRS
66 yo F w/ PMHx of ESRD x 20 yrs secondary to HTN, dialysis M/W/F ([facility], [facility]),  pulmonary HTN (on adempas/uptravi), diastolic dysfunction, central retinal artery occlusion, CAD with remote PCI, s/p failed kidney transplant [id number], PUD, STEFFANY on CPAP, hypothyroidism, RA, HLD and obesity who presents with bright red per rectum that started 2 days ago.  She has a hx of pAFand is coumadin, had a hx of GI bleeds in the past. Was found to have a Hgb of 4.5 mg/dl today and received 3 units of PRBC. No signicant SOB or CP. Patient also received 40 meql of KCl and 1 gm of Magnesium because of ectopies.    
66 yo F w/ PMHx  pulmonary HTN (on adempas/uptravi), diastolic dysfunction, pAF(on coumadin), central retinal artery occlusion without significant carotid artery stenosis, CAD with remote PCI, ESRD(LUE AVF) secondary to HTN, s/p failed kidney transplant, PUD, STEFFANY on CPAP, hypothyroidism, RA, HLD and obesity who presents with bright red per rectum that started 2 days ago.She had a single episode of non bloody emesis earlier today after she became dizzy from standing up too quickly. Her dizziness and lightheadedness improves with sitting and lying down. She was admitted to the CICU in the past for a similar episode 9/2018 and received 6 U PRBC.Upper gi endoscopy revealed  Normal esophagus,a few medium sessile polyps with no bleeding and no stigmata of recent bleeding were found in the gastric antrum.       [facility] consulted for Transfer of service as she has history of PH and on meds.  Patient developed right lower extremity swelling due to an IO in Right leg where she was being transfused. IO was dislodged and removed. Will do US venous doppler.  
68 yo F w/ PMHx  pulmonary HTN (on riociguat/selexipag), diastolic dysfunction, pAF(on warfarin), central retinal artery occlusion without significant carotid artery stenosis, CAD with remote PCI, ESRD(LUE AVF) secondary to HTN, s/p failed kidney transplant, PUD, STEFFANY on CPAP, hypothyroidism, RA, HLD and obesity who presents with bright red per rectum that started 2 days ago.She had a single episode of non bloody emesis earlier today after she became dizzy from standing up too quickly. Her dizziness and lightheadedness improves with sitting and lying down. She was admitted to the CICU in the past for a similar episode 9/2018 and received 6 U PRBC.Upper GI endoscopy revealed  Normal esophagus,a few medium sessile polyps with no bleeding and no stigmata of recent bleeding were found in the gastric antrum.       [facility] consulted for Transfer of service as she has history of PH and on meds.  Patient developed right lower extremity swelling due to an IO in Right leg where she was being transfused. IO was dislodged and removed. Swelling likely to extravascular infusion of blood.She had 2 episodes of Bright blood per rectum while in ED.      
Ms Sheets is a 67 year old female with history of Afib on coumadin, pulmonary HTN, ESRD (failed transplant), intestinal perforation (>10yr ago) recently hospitalized (9/2018) with melena, who is presenting to the hospital with concern for GI bleed with melena and H&H 5.8.    She was previously hospitalized (9/2018) with melena and Hgb of 3.8. She underwent EGD which was unremarkable for etiology of bleed and was seen as outpatient with plan for VCE (not yet done).    She reports that she was well until Saturday morning (1/12) when she had a bowel movement (~530AM) which was orange and then aftewards started to see blood clots (multiple episodes) so presented for evaluation. She denies any preceding melenic stool. Denies any abdominal pain. Endorses feeling nauseated yesterday with one episode of clear vomitus. On review endorses feeling weak but has improved since transfusion.    On admission labs were notable for H&H of 5.8 (11.2 12/2018) which declined to 4.5. She received 1u pRBC for hgb 5.8 but per nursing the line infiltrated and she did not receive any of the blood. She was transferred to ICU (hemodynamically stable, no vasopressor requirement) for further management. In ICU she was given 2u pRBC with minimal improvement (4.5 -> 5.3/5.1) so she was ordered for 3 additional units.    Prior Endo Hx  EGD. (9/11/18). Dr. Washington. Indication:Melena.  - gastric polyps    Colonoscopy. (6/12/18) Provider: Dr. Fakl. Indication: Hx of colon polyps. Extent: Cecum. Preparation:Good.  - 2 semi-sessile polyps in sigmoid and ascending (5mm)    EGD. (2/27/18). Dr. Desir. Indication:Melena.  - small hiatal hernia  - gastric polyps  
Ms Sheets is a 68 yo F w/ PMHx significant pulmonary HTN (on adempas/uptravi), diastolic dysfunction, pAF(on coumadin), central retinal artery occlusion without significant carotid artery stenosis, CAD with remote PCI, ESRD(LUE AVF) secondary to HTN, s/p failed kidney transplant, PUD, STEFFANY on CPAP, hypothyroidism, RA, HLD and obesity.    She presents with a two day history of bright red blood per rectum. She has had five episodes of bloody bowel movements since yesterday. Blood in her bowel movements initially presented as light-yellow but progressed to orange and then dark red in color. She states she also had a single episode of non-bilious non bloody emesis earlier today after she became dizzy from standing up too quickly. Her dizziness and lightheadedness improves with sitting and lying down. She was admitted to the CICU in the past for a similar episode 9/2018 and had to be resuscitated with six units of blood. She denies any abdominal pain, cough, dyspnea, chest pain, or leg pain.     She is followed by ophthalmology who have been treating her with monthly eye injections.   
40

## 2019-05-29 ENCOUNTER — INPATIENT (INPATIENT)
Facility: HOSPITAL | Age: 69
LOS: 8 days | Discharge: HOME | DRG: 551 | End: 2019-06-07
Admitting: SURGERY
Payer: MEDICARE

## 2019-05-29 VITALS
RESPIRATION RATE: 20 BRPM | OXYGEN SATURATION: 96 % | SYSTOLIC BLOOD PRESSURE: 168 MMHG | HEART RATE: 120 BPM | DIASTOLIC BLOOD PRESSURE: 98 MMHG

## 2019-05-29 DIAGNOSIS — Z95.828 PRESENCE OF OTHER VASCULAR IMPLANTS AND GRAFTS: Chronic | ICD-10-CM

## 2019-05-29 LAB
ALBUMIN SERPL ELPH-MCNC: 2.7 G/DL — LOW (ref 3.3–5.2)
ALP SERPL-CCNC: 138 U/L — HIGH (ref 40–120)
ALT FLD-CCNC: 10 U/L — SIGNIFICANT CHANGE UP
ANION GAP SERPL CALC-SCNC: 24 MMOL/L — HIGH (ref 5–17)
ANISOCYTOSIS BLD QL: SLIGHT — SIGNIFICANT CHANGE UP
APTT BLD: 32.5 SEC — SIGNIFICANT CHANGE UP (ref 27.5–36.3)
AST SERPL-CCNC: 15 U/L — SIGNIFICANT CHANGE UP
BASOPHILS # BLD AUTO: 0 K/UL — SIGNIFICANT CHANGE UP (ref 0–0.2)
BASOPHILS NFR BLD AUTO: 0.1 % — SIGNIFICANT CHANGE UP (ref 0–2)
BILIRUB SERPL-MCNC: 0.4 MG/DL — SIGNIFICANT CHANGE UP (ref 0.4–2)
BLD GP AB SCN SERPL QL: SIGNIFICANT CHANGE UP
BUN SERPL-MCNC: 25 MG/DL — HIGH (ref 8–20)
CALCIUM SERPL-MCNC: 10.6 MG/DL — HIGH (ref 8.6–10.2)
CHLORIDE SERPL-SCNC: 95 MMOL/L — LOW (ref 98–107)
CK SERPL-CCNC: 79 U/L — SIGNIFICANT CHANGE UP (ref 30–200)
CO2 SERPL-SCNC: 19 MMOL/L — LOW (ref 22–29)
CREAT SERPL-MCNC: 1.08 MG/DL — SIGNIFICANT CHANGE UP (ref 0.5–1.3)
EOSINOPHIL # BLD AUTO: 0 K/UL — SIGNIFICANT CHANGE UP (ref 0–0.5)
EOSINOPHIL NFR BLD AUTO: 0.1 % — SIGNIFICANT CHANGE UP (ref 0–6)
ETHANOL SERPL-MCNC: <10 MG/DL — SIGNIFICANT CHANGE UP
GAS PNL BLDV: SIGNIFICANT CHANGE UP
GLUCOSE SERPL-MCNC: 615 MG/DL — CRITICAL HIGH (ref 70–115)
HCO3 BLDV-SCNC: 18 MMOL/L — LOW (ref 20–26)
HCT VFR BLD CALC: 28.1 % — LOW (ref 42–52)
HGB BLD-MCNC: 8.7 G/DL — LOW (ref 14–18)
INR BLD: 1.24 RATIO — HIGH (ref 0.88–1.16)
LIDOCAIN IGE QN: 34 U/L — SIGNIFICANT CHANGE UP (ref 22–51)
LYMPHOCYTES # BLD AUTO: 18.5 % — LOW (ref 20–55)
LYMPHOCYTES # BLD AUTO: 2.3 K/UL — SIGNIFICANT CHANGE UP (ref 1–4.8)
MACROCYTES BLD QL: SLIGHT — SIGNIFICANT CHANGE UP
MCHC RBC-ENTMCNC: 22.7 PG — LOW (ref 27–31)
MCHC RBC-ENTMCNC: 31 G/DL — LOW (ref 32–36)
MCV RBC AUTO: 73.2 FL — LOW (ref 80–94)
MICROCYTES BLD QL: SLIGHT — SIGNIFICANT CHANGE UP
MONOCYTES # BLD AUTO: 0.9 K/UL — HIGH (ref 0–0.8)
MONOCYTES NFR BLD AUTO: 7.3 % — SIGNIFICANT CHANGE UP (ref 3–10)
NEUTROPHILS # BLD AUTO: 9.3 K/UL — HIGH (ref 1.8–8)
NEUTROPHILS NFR BLD AUTO: 73.5 % — HIGH (ref 37–73)
OVALOCYTES BLD QL SMEAR: SLIGHT — SIGNIFICANT CHANGE UP
PCO2 BLDV: 34 MMHG — LOW (ref 35–50)
PH BLDV: 7.34 — SIGNIFICANT CHANGE UP (ref 7.32–7.43)
PLAT MORPH BLD: NORMAL — SIGNIFICANT CHANGE UP
PLATELET # BLD AUTO: 286 K/UL — SIGNIFICANT CHANGE UP (ref 150–400)
PO2 BLDV: 116 MMHG — HIGH (ref 25–45)
POIKILOCYTOSIS BLD QL AUTO: SLIGHT — SIGNIFICANT CHANGE UP
POTASSIUM SERPL-MCNC: 4.3 MMOL/L — SIGNIFICANT CHANGE UP (ref 3.5–5.3)
POTASSIUM SERPL-SCNC: 4.3 MMOL/L — SIGNIFICANT CHANGE UP (ref 3.5–5.3)
PROT SERPL-MCNC: 9.7 G/DL — HIGH (ref 6.6–8.7)
PROTHROM AB SERPL-ACNC: 14.3 SEC — HIGH (ref 10–12.9)
RBC # BLD: 3.84 M/UL — LOW (ref 4.6–6.2)
RBC # FLD: 19 % — HIGH (ref 11–15.6)
RBC BLD AUTO: ABNORMAL
SAO2 % BLDV: 98 % — SIGNIFICANT CHANGE UP
SODIUM SERPL-SCNC: 138 MMOL/L — SIGNIFICANT CHANGE UP (ref 135–145)
TROPONIN T SERPL-MCNC: 0.03 NG/ML — SIGNIFICANT CHANGE UP (ref 0–0.06)
TYPE + AB SCN PNL BLD: SIGNIFICANT CHANGE UP
WBC # BLD: 12.6 K/UL — HIGH (ref 4.8–10.8)
WBC # FLD AUTO: 12.6 K/UL — HIGH (ref 4.8–10.8)

## 2019-05-29 PROCEDURE — 71260 CT THORAX DX C+: CPT | Mod: 26

## 2019-05-29 PROCEDURE — 99285 EMERGENCY DEPT VISIT HI MDM: CPT

## 2019-05-29 PROCEDURE — 74177 CT ABD & PELVIS W/CONTRAST: CPT | Mod: 26

## 2019-05-29 PROCEDURE — 71045 X-RAY EXAM CHEST 1 VIEW: CPT | Mod: 26

## 2019-05-29 PROCEDURE — 70450 CT HEAD/BRAIN W/O DYE: CPT | Mod: 26

## 2019-05-29 PROCEDURE — 72125 CT NECK SPINE W/O DYE: CPT | Mod: 26

## 2019-05-29 RX ORDER — SODIUM CHLORIDE 9 MG/ML
1000 INJECTION INTRAMUSCULAR; INTRAVENOUS; SUBCUTANEOUS ONCE
Refills: 0 | Status: COMPLETED | OUTPATIENT
Start: 2019-05-29 | End: 2019-05-29

## 2019-05-29 RX ORDER — SODIUM CHLORIDE 9 MG/ML
1000 INJECTION INTRAMUSCULAR; INTRAVENOUS; SUBCUTANEOUS
Refills: 0 | Status: DISCONTINUED | OUTPATIENT
Start: 2019-05-29 | End: 2019-05-29

## 2019-05-29 RX ORDER — SODIUM CHLORIDE 9 MG/ML
2000 INJECTION INTRAMUSCULAR; INTRAVENOUS; SUBCUTANEOUS ONCE
Refills: 0 | Status: COMPLETED | OUTPATIENT
Start: 2019-05-29 | End: 2019-05-29

## 2019-05-29 RX ORDER — SODIUM CHLORIDE 9 MG/ML
1000 INJECTION INTRAMUSCULAR; INTRAVENOUS; SUBCUTANEOUS
Refills: 0 | Status: DISCONTINUED | OUTPATIENT
Start: 2019-05-29 | End: 2019-05-30

## 2019-05-29 RX ORDER — INSULIN HUMAN 100 [IU]/ML
10 INJECTION, SOLUTION SUBCUTANEOUS ONCE
Refills: 0 | Status: COMPLETED | OUTPATIENT
Start: 2019-05-29 | End: 2019-05-29

## 2019-05-29 RX ORDER — INSULIN HUMAN 100 [IU]/ML
2 INJECTION, SOLUTION SUBCUTANEOUS
Qty: 100 | Refills: 0 | Status: DISCONTINUED | OUTPATIENT
Start: 2019-05-29 | End: 2019-05-31

## 2019-05-29 RX ADMIN — SODIUM CHLORIDE 1000 MILLILITER(S): 9 INJECTION INTRAMUSCULAR; INTRAVENOUS; SUBCUTANEOUS at 22:00

## 2019-05-29 RX ADMIN — SODIUM CHLORIDE 2000 MILLILITER(S): 9 INJECTION INTRAMUSCULAR; INTRAVENOUS; SUBCUTANEOUS at 23:18

## 2019-05-29 NOTE — ED ADULT NURSE REASSESSMENT NOTE - NS ED NURSE REASSESS COMMENT FT1
Assumed pt care from RN MS, charting as noted, pt received in c-collar lying flat in stretcher, noted to be tachycardic at 120 bpm going as high as 140 bpm, MD AW made aware, ICU PA made aware, ICU PA at bedside evaluating pt for consult. Pt is A+Ox0 at this time, not answering any questions, incomprehensible speech and noises, pt is moving all four extremities. Pt is awaiting Insulin gtt to be made at pharmacy to initiate medication therapy for hyperglycemia. VS as noted. Pt safety maintained. Assumed pt care from RN MS, charting as noted, pt received in c-collar lying flat in stretcher, noted to be tachycardic at 120 bpm going as high as 140 bpm and tele tech made RN aware that pt was at 170 bpm not sustained, MD OLIVEIRA made aware, ICU PA made aware, stat EKG obtained, ICU PA at bedside evaluating pt for consult. Pt is A+Ox0 at this time, not answering any questions, incomprehensible speech and noises, pt is moving all four extremities. Pt is awaiting Insulin gtt to be made at pharmacy to initiate medication therapy for hyperglycemia. VS as noted. Pt safety maintained.

## 2019-05-29 NOTE — ED PROVIDER NOTE - CARE PLAN
Principal Discharge DX:	Fall  Secondary Diagnosis:	DKA (diabetic ketoacidosis)  Secondary Diagnosis:	Cervical spine fracture

## 2019-05-29 NOTE — ED PROVIDER NOTE - OBJECTIVE STATEMENT
This patient is a 72 year old man with hx of lung cancer who presents to the ER via EMS s/p fall down stairs.  EMS report friends found him at the bottom of the stairs patient answering some questions but seems confused.  Patient's baseline mental status unknown.

## 2019-05-29 NOTE — ED PROVIDER NOTE - CLINICAL SUMMARY MEDICAL DECISION MAKING FREE TEXT BOX
72 year old man s/p fall  no stepoffs or obvious signs or trauma.  CT showed cervical transverse process fracture.  Patient noted to be in DKA insulin infusion started and medical ICU called. 72 year old man s/p fall  no stepoffs or obvious signs or trauma.  CT showed cervical transverse process fracture.  Patient noted to be in DKA insulin infusion started and medical ICU called and patient admitted to trauma service.

## 2019-05-29 NOTE — H&P ADULT - HISTORY OF PRESENT ILLNESS
73yo male presents after fall down 12 stairs. Unknown if he takes blood thinners. Unknown LOC. Found at bottom of stairs by friends. Upon arrival, EMS states his GCS 13 (inappropriate speech). Unsure of mechanism how patient fell. Currently complains of neck pain.     Primary Survey:  A-Airway Intact  B-Breath Sounds Ida bilaterally  C-Central and peripheral pulses  D-GCS 13  E-no stepoffs or bony deformities    Unknown of medical hx

## 2019-05-29 NOTE — H&P ADULT - NSHPPHYSICALEXAM_GEN_ALL_CORE
HEENT: Normocephalic, atraumatic, JOE, EOM wnl, no otorrhea or hemotympanum b/l, no epistaxis or d/c b/l nares, no craniofacial bony pathology or tenderness b/l  Neck: Pt in hard cervical collar at time of exam. No crepitus, no ecchymosis, no hematoma, to exam, no JVD, no tracheal deviation  Cspine/thoracolumbrosacral spine: no gross bony pathology or tenderness to exam  Cardiovascular: S1S2 Present  Chest: no gross rib pathology or tenderness to exam. No sternal pathology or tenderness to exam. No crepitus, no ecchymosis, no hematoma. No penetrating thorcoabdominal trauma  Respiratory: Rate is 18; Respiratory Effort normal; no wheezes, rales or rhonchi to exam  ABD: bowel sounds (+), soft, nontender, non distended, no rebound, no guarding, no rigidity, no skin changes to exam. No pelvic instability to exam, no skin changes  Musculoskeletal: Pt has palpable b/l radial, femoral, dorsalis pedis pulses. All digits are warm and well perfused. No gross long bone pathology or tenderness to exam. Pt demonstrates grossly intact sensoromotor function. Pt has good capillary refill to digits, no calf edema or tenderness to exam.  Neurovascular: Sensation grossly intact. Moves all extremities  Skin: no lesions or rashes to exam

## 2019-05-29 NOTE — CONSULT NOTE ADULT - ASSESSMENT
72 year old male s/p fall down stairs with traumatic C6 spinous process fracture who subsequently is in DKA. Pt also has significant lung mass with probable endstage lung cancer.     The medical ICU was consulted to manage the DKA while trauma team admits patient post fall to their service with a C6 right spinous process fracture and need to rule out ligamental damage.  Insulin drip was started by ER MD, and I recommended IV bolus of 10 units of insulin as well as increase maintenance   IV fluids to 150ml/hr for aggressive fluid hydration with a goal UOP >0.5 cc/kg/hr, Q1H accuchecks, Q6H BMP, magnesium, phosphorous level, acetone, when blood sugar <250 add dextrose to IVF, consider change to D5 1/2 NS , when BMP shows closure of anion gap, consider adding  long acting insulin (lantus).  Once Lantus given, continue insulin drip for 1 hour, and then begin using sliding scale insulin , continue IVF at low rate and send HbA1C with morning labs. The above assessment and plan was discussed with Dr Phillip who did have a discussion with Dr Carlson concerning admitting service.     Time spent: 30 mins assessing presenting problems of acute illness that poses high probability  end organ damage/dysfunction.  Medical decision making inculding plan of daily care, reviewing data, reviewing radiology, discussing with multidisciplinary team, non inclusive of procedures, discussing goals of care with patient/family

## 2019-05-29 NOTE — ED PROVIDER NOTE - PROGRESS NOTE DETAILS
Trauma team recommends medical admission.  Insulin infusion started.  ICU called PA made aware. ICU PA states that ICU attending will not accept patient to ICU and patient should be admitted to the trauma service.

## 2019-05-29 NOTE — CONSULT NOTE ADULT - PROBLEM SELECTOR RECOMMENDATION 2
-admit to ICU  -On insulin drip, titrate per MICU protocol  - Q1H accuchecks  -aggressive fluid hydration, goal UOP >0.5 cc/kg/hr  -when blood sugar <250 add dextrose to IVF, consider change to D5 1/2 NS   -when BMP shows closure of anion gap, add long acting insulin (lantus)  -Once lantus given, continue insulin drip for 1 hour, and then begin using sliding scale insulin w/ meals and at bedtime or Q6H if remains NPO  -if patient able to tolerate PO start on diet, if not continue IVF at low rate  -Diabetic education and counseling  -send HbA1C

## 2019-05-29 NOTE — ED ADULT TRIAGE NOTE - CHIEF COMPLAINT QUOTE
pt AMS s/p estimated 12-15 stairs, unknown LOC, unknown use of anticoagulant therapy. as per EMS pt initial GCS was 15, on arrival GCS decreased to 13. Pt directed to trauma room

## 2019-05-29 NOTE — ED PROVIDER NOTE - NS ED ATTENDING STATEMENT MOD
good/Mother reported that pt is an overall good eater and willing to try new foods. Liking most food groups. Takes gummy MVI daily. NKFA. KOSHER diet. No other supplement use. Somewhat active. Attending Only

## 2019-05-29 NOTE — CONSULT NOTE ADULT - SUBJECTIVE AND OBJECTIVE BOX
Pt Name: GALILEA OROSCO    MRN: [id number]      Patient is a 72y Male presenting to the emergency department s/p mech fall down stairs, found by friends. History obtained from the chart and ACS team, patient unable to communicate appropriately. Pt is GCS 15 at baseline - this is not patients typical mental status. Pt can nod yes and no to some questions, but not consistently. Pt denies any numbness or tingling anywhere. C/o pain in his neck. Shakes his head "yes" when asked if he can move all of his extremities okay. Rest of history unattainable at this time, patient will need secondary exam if/when possible.          HEALTH ISSUES - PROBLEM Dx:      .      REVIEW OF SYSTEMS      General: Alert, responsive, in NAD    Skin/Breast: No rashes, no pruritis   	  Ophthalmologic: No visual changes. No redness.   	  ENMT:	No discharge. No swelling.    Respiratory and Thorax: No difficulty breathing. No cough.  	   Cardiovascular:	No chest pain. No palpitations.    Gastrointestinal:	 No abdominal pain. No diarrhea.     Genitourinary: No dysuria. No bleeding.    Musculoskeletal: SEE HPI.    Neurological: No sensory or motor changes.     Psychiatric: No anxiety or depression.    Hematology/Lymphatics: No swelling.    Endocrine: No Hx of diabetes.    ROS is otherwise negative.    PAST MEDICAL & SURGICAL HISTORY:  PAST MEDICAL & SURGICAL HISTORY:  No pertinent past medical history  No significant past surgical history      Allergies: Allergy Status Unknown      Medications: insulin regular  human recombinant. 10 Unit(s) SubCutaneous once  insulin regular Infusion 9 Unit(s)/Hr IV Continuous <Continuous>  sodium chloride 0.9%. 1000 milliLiter(s) IV Continuous <Continuous>      FAMILY HISTORY:  No pertinent family history in first degree relatives  : non-contributory    Social History:     Ambulation: Walking independently [ ] With Cane [ ] With Walker [ ]  Bed / wheelchairbound [ ]                           8.7    12.6  )-----------( 286      ( 29 May 2019 19:01 )             28.1     05-29    138  |  95<L>  |  25.0<H>  ----------------------------<  615<HH>  4.3   |  19.0<L>  |  1.08    Ca    10.6<H>      29 May 2019 19:01    TPro  9.7<H>  /  Alb  2.7<L>  /  TBili  0.4  /  DBili  x   /  AST  15  /  ALT  10  /  AlkPhos  138<H>  05-29      PHYSICAL EXAM:    Vital Signs Last 24 Hrs  T(C): --  T(F): --  HR: 120 (29 May 2019 23:25) (120 - 120)  BP: 168/98 (29 May 2019 23:25) (168/98 - 168/98)  BP(mean): --  RR: 20 (29 May 2019 23:25) (20 - 20)  SpO2: 96% (29 May 2019 23:25) (96% - 96%)  Daily     Daily     Appearance: Alert, responsive, in no acute distress.    Skin: no rash on visible skin. Skin is clean, dry and intact. No bleeding. No abrasions. No ulcerations.    Vascular: 2+ distal pulses. Cap refill < 2 sec. No signs of venous  insufficiency  or stasis. No extremity ulcerations. No cyanosis.    Musculoskeletal:  wearing c-collar       Left Upper Extremity: Atraumatic with normal alignment NROM. No crepitus. No bony tenderness.        Right Upper Extremity: Atraumatic with normal alignment NROM. No crepitus. No bony tenderness.        Left Lower Extremity: Atraumatic with normal alignment NROM. No crepitus. No bony tenderness.        Right Lower Extremity: Atraumatic with normal alignment NROM. No crepitus. No bony tenderness.     Neurological: Sensation is grossly intact to light touch. No focal deficits or weaknesses found.    Pathologic reflexes: none  DTRs: normal      Motor exam:         Upper extremities - moving upper extremities spontaneously, will not follow most commands, no deficits noted         Lower extremities  - moving upper extremities spontaneously, will not follow most commands, no deficits noted    Imaging Studies:    < from: CT Cervical Spine No Cont (05.29.19 @ 19:45) >     EXAM:  CT CERVICAL SPINE                          PROCEDURE DATE:  05/29/2019          INTERPRETATION:  HISTORY: Trauma. Neck pain.. Assess for fracture.    Technique: 1.25 mm axial sections with sagittal and coronal   reconstructions, were obtained from T2 to the base of the skull without   contrast.    Comparisons: None    FINDINGS:  There is a fracture, nondisplaced of the RIGHT C6 transverse process and   adjacent to the vertebral foramen without definite displaced bone   fragment.  There is degenerative narrowing of the C4 C5, C5 C6, C6 C7, C7 T1 disc   levels. Axial imaging shows a narrowing of the LEFT C4-C5 and bilateral   C6-C7 neural foramens by uncovertebral spur formation..    C1-C2 vertebra aside from the degenerative narrowing and sclerosis of the   C3-C1 anterior arch articulation unremarkable.  No paraspinal soft tissue hematoma.. 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    Impression: Fracture of RIGHT C6 transverse process.  Degenerative spondylosis.    Critical value  discussed with Dr. Ha, on 5/29/2019 at 7:50 PM with   read back.  Hospital policies for critical values including read back   policy were followed.  The verbal communication of the critical value   supplements this written report.     SWETA CAIN M.D., ATTENDING RADIOLOGIST  This document has been electronically signed. May 29 2019  8:04PM        < end of copied text >      A/P:  Pt is a  72y Male with C6 right transverse process fx    PLAN:  * CTA due to AMS  * continue C-collar  * Pain Control  * Cont. care per trauma team  * bedrest

## 2019-05-29 NOTE — CONSULT NOTE ADULT - SUBJECTIVE AND OBJECTIVE BOX
72y  Male  Allergy Status Unknown    CC: Patient is a 72y old  Male who presents with a chief complaint of fall     HPI:  73yo male presents after fall down 12 stairs. Unknown if he takes blood thinners. Unknown LOC. Found at bottom of stairs by friends. Upon arrival, EMS states his GCS 13 (inappropriate speech). Unsure of mechanism how patient fell. Currently complains of neck pain.     Primary Survey:  A-Airway Intact  B-Breath Sounds [place] bilaterally  C-Central and peripheral pulses  D-GCS 13  E-no stepoffs or bony deformities    Unknown of medical hx (29 May 2019 19:56)    PAST MEDICAL & SURGICAL HISTORY:  No pertinent past medical history  No significant past surgical history    FAMILY HISTORY:  No pertinent family history in first degree relatives      Vitals   Vital Signs Last 24 Hrs  T(C): --  T(F): --  HR: 105 (30 May 2019 00:19) (105 - 120)  BP: 171/81 (30 May 2019 00:19) (168/98 - 171/81)  BP(mean): --  RR: 18 (30 May 2019 00:19) (18 - 20)  SpO2: 94% (30 May 2019 00:19) (94% - 96%)    I&O's Summary      LABS  05-29    138  |  95<L>  |  25.0<H>  ----------------------------<  615<HH>  4.3   |  19.0<L>  |  1.08    Ca    10.6<H>      29 May 2019 19:01    TPro  9.7<H>  /  Alb  2.7<L>  /  TBili  0.4  /  DBili  x   /  AST  15  /  ALT  10  /  AlkPhos  138<H>  05-29                          7.7    12.4  )-----------( 253      ( 30 May 2019 00:30 )             25.4     PT/INR - ( 30 May 2019 00:30 )   PT: 14.7 sec;   INR: 1.27 ratio         PTT - ( 29 May 2019 19:01 )  PTT:32.5 sec  CARDIAC MARKERS ( 29 May 2019 22:27 )  x     / 0.03 ng/mL / 79 U/L / x     / x          LIVER FUNCTIONS - ( 29 May 2019 19:01 )  Alb: 2.7 g/dL / Pro: 9.7 g/dL / ALK PHOS: 138 U/L / ALT: 10 U/L / AST: 15 U/L / GGT: x           CAPILLARY BLOOD GLUCOSE      POCT Blood Glucose.: >530 mg/dL (29 May 2019 23:57)        VENT SETTINGS       Meds  MEDICATIONS  (STANDING):  insulin regular Infusion 9 Unit(s)/Hr (9 mL/Hr) IV Continuous <Continuous>  sodium chloride 0.9%. 1000 milliLiter(s) (150 mL/Hr) IV Continuous <Continuous>      REVIEW OF SYSTEMS:    Unable to obtain due to poor mental status     Physicial Exam:     Constitutional: post fall in cervical collar   HEENT: PERRLA, EOMI, no drainage or redness  Neck: No bruits; no thyromegaly or nodules,  No JVD  Back: Normal spine flexure, No CVA tenderness, No deformity or limitation of movement  Respiratory: Breath Sounds equal & clear to percussion & auscultation, no accessory muscle use  Cardiovascular: Regular rate & rhythm, normal S1, S2; no murmurs, gallops or rubs; no S3, S4  Gastrointestinal: Soft, non-tender, non distended no hepatosplenomegaly, normal bowel sounds  Extremities: No peripheral edema, No cyanosis, clubbing   Vascular: Equal and normal pulses: 2+ peripheral pulses throughout  Neurological: GCS:    A&O x 3; no sensory, motor  deficits, normal reflexes  Psychiatric: Normal mood, normal affect  Musculoskeletal: No joint pain, swelling or deformity; no limitation of movement  Skin: No rashes 72y  Male  Allergy Status Unknown    CC: Patient is a 72y old  Male who presents with a chief complaint of fall     HPI:  72 year old male presented to ER as code trauma after fall down 12 stairs. He was found at bottom of stairs by friends, had a GCS of 13 on EMS arrival.  The mechanism of the fall is not known nor is his baseline mental status or PMHx. He is expressive enough to complain of neck pain, and follow simple commands in the ER but is not oriented to person place or time and is unreliable in his exam findings and review of systems. During the intially trauma workup the labs showed patient to be in DKA with anion gap of 24 and serum glucose of 615. bs      Unknown of medical hx (29 May 2019 19:56)    PAST MEDICAL & SURGICAL HISTORY:  No pertinent past medical history  No significant past surgical history    FAMILY HISTORY:  No pertinent family history in first degree relatives    Vitals   Vital Signs Last 24 Hrs  T(C): --  T(F): --  HR: 105 (30 May 2019 00:19) (105 - 120)  BP: 171/81 (30 May 2019 00:19) (168/98 - 171/81)  BP(mean): --  RR: 18 (30 May 2019 00:19) (18 - 20)  SpO2: 94% (30 May 2019 00:19) (94% - 96%)    LABS  05-29    138  |  95<L>  |  25.0<H>  ----------------------------<  615<HH>  4.3   |  19.0<L>  |  1.08  Ca    10.6<H>      29 May 2019 19:01  TPro  9.7<H>  /  Alb  2.7<L>  /  TBili  0.4  /  DBili  x   /  AST  15  /  ALT  10  /  AlkPhos  138<H>  05-29                        7.7    12.4  )-----------( 253      ( 30 May 2019 00:30 )             25.4     PT/INR - ( 30 May 2019 00:30 )   PT: 14.7 sec;   INR: 1.27 ratio    PTT - ( 29 May 2019 19:01 )  PTT:32.5 sec  CARDIAC MARKERS ( 29 May 2019 22:27 )  x     / 0.03 ng/mL / 79 U/L / x     / x          LIVER FUNCTIONS - ( 29 May 2019 19:01 )  Alb: 2.7 g/dL / Pro: 9.7 g/dL / ALK PHOS: 138 U/L / ALT: 10 U/L / AST: 15 U/L / GGT: x           CAPILLARY BLOOD GLUCOSE  POCT Blood Glucose.: >530 mg/dL (29 May 2019 23:57)    MEDICATIONS  (STANDING):  insulin regular Infusion 9 Unit(s)/Hr (9 mL/Hr) IV Continuous <Continuous>  sodium chloride 0.9%. 1000 milliLiter(s) (150 mL/Hr) IV Continuous <Continuous>      REVIEW OF SYSTEMS:    Unable to obtain due to poor mental status     Physicial Exam:     Constitutional: post fall in cervical collar   HEENT: PERRLA, EOMI, no drainage or redness  Neck: No bruits; no thyromegaly or nodules,  No JVD  Back: Normal spine flexure, No CVA tenderness, No deformity or limitation of movement  Respiratory: Breath Sounds equal & clear to percussion & auscultation, no accessory muscle use  Cardiovascular: Regular rate & rhythm, normal S1, S2; no murmurs, gallops or rubs; no S3, S4  Gastrointestinal: Soft, non-tender, non distended no hepatosplenomegaly, normal bowel sounds  Extremities: No peripheral edema, No cyanosis, clubbing   Vascular: Equal and normal pulses: 2+ peripheral pulses throughout  Neurological: GCS:    A&O x 3; no sensory, motor  deficits, normal reflexes  Psychiatric: Normal mood, normal affect  Musculoskeletal: No joint pain, swelling or deformity; no limitation of movement  Skin: No rashes

## 2019-05-30 DIAGNOSIS — S12.590A OTHER DISPLACED FRACTURE OF SIXTH CERVICAL VERTEBRA, INITIAL ENCOUNTER FOR CLOSED FRACTURE: ICD-10-CM
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
## 2019-05-30 DIAGNOSIS — S12.9XXA FRACTURE OF NECK, UNSPECIFIED, INITIAL ENCOUNTER: ICD-10-CM

## 2019-05-30 DIAGNOSIS — E13.10 OTHER SPECIFIED DIABETES MELLITUS WITH KETOACIDOSIS WITHOUT COMA: ICD-10-CM

## 2019-05-30 DIAGNOSIS — C34.01 MALIGNANT NEOPLASM OF RIGHT MAIN BRONCHUS: ICD-10-CM

## 2019-05-30 DIAGNOSIS — C34.90 MALIGNANT NEOPLASM OF UNSPECIFIED PART OF UNSPECIFIED BRONCHUS OR LUNG: ICD-10-CM

## 2019-05-30 LAB
ABO RH CONFIRMATION: SIGNIFICANT CHANGE UP
ALBUMIN SERPL ELPH-MCNC: 2.5 G/DL — LOW (ref 3.3–5.2)
ALP SERPL-CCNC: 123 U/L — HIGH (ref 40–120)
ALT FLD-CCNC: 9 U/L — SIGNIFICANT CHANGE UP
ANION GAP SERPL CALC-SCNC: 14 MMOL/L — SIGNIFICANT CHANGE UP (ref 5–17)
ANION GAP SERPL CALC-SCNC: 14 MMOL/L — SIGNIFICANT CHANGE UP (ref 5–17)
ANION GAP SERPL CALC-SCNC: 18 MMOL/L — HIGH (ref 5–17)
ANION GAP SERPL CALC-SCNC: 22 MMOL/L — HIGH (ref 5–17)
APPEARANCE UR: CLEAR — SIGNIFICANT CHANGE UP
AST SERPL-CCNC: 15 U/L — SIGNIFICANT CHANGE UP
BILIRUB SERPL-MCNC: 0.3 MG/DL — LOW (ref 0.4–2)
BILIRUB UR-MCNC: NEGATIVE — SIGNIFICANT CHANGE UP
BUN SERPL-MCNC: 13 MG/DL — SIGNIFICANT CHANGE UP (ref 8–20)
BUN SERPL-MCNC: 15 MG/DL — SIGNIFICANT CHANGE UP (ref 8–20)
BUN SERPL-MCNC: 19 MG/DL — SIGNIFICANT CHANGE UP (ref 8–20)
BUN SERPL-MCNC: 23 MG/DL — HIGH (ref 8–20)
CALCIUM SERPL-MCNC: 8.5 MG/DL — LOW (ref 8.6–10.2)
CALCIUM SERPL-MCNC: 9.1 MG/DL — SIGNIFICANT CHANGE UP (ref 8.6–10.2)
CALCIUM SERPL-MCNC: 9.7 MG/DL — SIGNIFICANT CHANGE UP (ref 8.6–10.2)
CALCIUM SERPL-MCNC: 9.9 MG/DL — SIGNIFICANT CHANGE UP (ref 8.6–10.2)
CHLORIDE SERPL-SCNC: 104 MMOL/L — SIGNIFICANT CHANGE UP (ref 98–107)
CHLORIDE SERPL-SCNC: 108 MMOL/L — HIGH (ref 98–107)
CHLORIDE SERPL-SCNC: 113 MMOL/L — HIGH (ref 98–107)
CHLORIDE SERPL-SCNC: 113 MMOL/L — HIGH (ref 98–107)
CO2 SERPL-SCNC: 17 MMOL/L — LOW (ref 22–29)
CO2 SERPL-SCNC: 19 MMOL/L — LOW (ref 22–29)
CO2 SERPL-SCNC: 21 MMOL/L — LOW (ref 22–29)
CO2 SERPL-SCNC: 22 MMOL/L — SIGNIFICANT CHANGE UP (ref 22–29)
COLOR SPEC: YELLOW — SIGNIFICANT CHANGE UP
CREAT SERPL-MCNC: 0.5 MG/DL — SIGNIFICANT CHANGE UP (ref 0.5–1.3)
CREAT SERPL-MCNC: 0.55 MG/DL — SIGNIFICANT CHANGE UP (ref 0.5–1.3)
CREAT SERPL-MCNC: 0.71 MG/DL — SIGNIFICANT CHANGE UP (ref 0.5–1.3)
CREAT SERPL-MCNC: 0.94 MG/DL — SIGNIFICANT CHANGE UP (ref 0.5–1.3)
DIFF PNL FLD: ABNORMAL
EOSINOPHIL # BLD AUTO: 0 K/UL — SIGNIFICANT CHANGE UP (ref 0–0.5)
EOSINOPHIL NFR BLD AUTO: 0 % — SIGNIFICANT CHANGE UP (ref 0–5)
EPI CELLS # UR: SIGNIFICANT CHANGE UP
GLUCOSE BLDC GLUCOMTR-MCNC: 134 MG/DL — HIGH (ref 70–99)
GLUCOSE BLDC GLUCOMTR-MCNC: 152 MG/DL — HIGH (ref 70–99)
GLUCOSE BLDC GLUCOMTR-MCNC: 154 MG/DL — HIGH (ref 70–99)
GLUCOSE BLDC GLUCOMTR-MCNC: 159 MG/DL — HIGH (ref 70–99)
GLUCOSE BLDC GLUCOMTR-MCNC: 167 MG/DL — HIGH (ref 70–99)
GLUCOSE BLDC GLUCOMTR-MCNC: 174 MG/DL — HIGH (ref 70–99)
GLUCOSE BLDC GLUCOMTR-MCNC: 181 MG/DL — HIGH (ref 70–99)
GLUCOSE BLDC GLUCOMTR-MCNC: 183 MG/DL — HIGH (ref 70–99)
GLUCOSE BLDC GLUCOMTR-MCNC: 187 MG/DL — HIGH (ref 70–99)
GLUCOSE BLDC GLUCOMTR-MCNC: 195 MG/DL — HIGH (ref 70–99)
GLUCOSE BLDC GLUCOMTR-MCNC: 197 MG/DL — HIGH (ref 70–99)
GLUCOSE BLDC GLUCOMTR-MCNC: 197 MG/DL — HIGH (ref 70–99)
GLUCOSE BLDC GLUCOMTR-MCNC: 212 MG/DL — HIGH (ref 70–99)
GLUCOSE BLDC GLUCOMTR-MCNC: 218 MG/DL — HIGH (ref 70–99)
GLUCOSE BLDC GLUCOMTR-MCNC: 219 MG/DL — HIGH (ref 70–99)
GLUCOSE BLDC GLUCOMTR-MCNC: 253 MG/DL — HIGH (ref 70–99)
GLUCOSE BLDC GLUCOMTR-MCNC: 256 MG/DL — HIGH (ref 70–99)
GLUCOSE BLDC GLUCOMTR-MCNC: 279 MG/DL — HIGH (ref 70–99)
GLUCOSE BLDC GLUCOMTR-MCNC: 282 MG/DL — HIGH (ref 70–99)
GLUCOSE BLDC GLUCOMTR-MCNC: 282 MG/DL — HIGH (ref 70–99)
GLUCOSE BLDC GLUCOMTR-MCNC: 285 MG/DL — HIGH (ref 70–99)
GLUCOSE BLDC GLUCOMTR-MCNC: 293 MG/DL — HIGH (ref 70–99)
GLUCOSE BLDC GLUCOMTR-MCNC: 392 MG/DL — HIGH (ref 70–99)
GLUCOSE SERPL-MCNC: 185 MG/DL — HIGH (ref 70–115)
GLUCOSE SERPL-MCNC: 203 MG/DL — HIGH (ref 70–115)
GLUCOSE SERPL-MCNC: 238 MG/DL — HIGH (ref 70–115)
GLUCOSE SERPL-MCNC: 548 MG/DL — CRITICAL HIGH (ref 70–115)
GLUCOSE UR QL: 1000 MG/DL
HCT VFR BLD CALC: 25.4 % — LOW (ref 42–52)
HCT VFR BLD CALC: 26.6 % — LOW (ref 42–52)
HCV AB S/CO SERPL IA: 12.83 S/CO — HIGH (ref 0–0.99)
HCV AB SERPL-IMP: REACTIVE
HGB BLD-MCNC: 7.7 G/DL — LOW (ref 14–18)
HGB BLD-MCNC: 8.1 G/DL — LOW (ref 14–18)
INR BLD: 1.27 RATIO — HIGH (ref 0.88–1.16)
KETONES UR-MCNC: ABNORMAL
LACTATE BLDV-MCNC: 2 MMOL/L — SIGNIFICANT CHANGE UP (ref 0.5–2)
LEUKOCYTE ESTERASE UR-ACNC: NEGATIVE — SIGNIFICANT CHANGE UP
LYMPHOCYTES # BLD AUTO: 1.2 K/UL — SIGNIFICANT CHANGE UP (ref 1–4.8)
LYMPHOCYTES # BLD AUTO: 10.5 % — LOW (ref 20–55)
MAGNESIUM SERPL-MCNC: 1.4 MG/DL — LOW (ref 1.6–2.6)
MAGNESIUM SERPL-MCNC: 1.6 MG/DL — SIGNIFICANT CHANGE UP (ref 1.6–2.6)
MAGNESIUM SERPL-MCNC: 1.8 MG/DL — SIGNIFICANT CHANGE UP (ref 1.6–2.6)
MAGNESIUM SERPL-MCNC: 2.7 MG/DL — HIGH (ref 1.6–2.6)
MCHC RBC-ENTMCNC: 22.1 PG — LOW (ref 27–31)
MCHC RBC-ENTMCNC: 22.3 PG — LOW (ref 27–31)
MCHC RBC-ENTMCNC: 30.3 G/DL — LOW (ref 32–36)
MCHC RBC-ENTMCNC: 30.5 G/DL — LOW (ref 32–36)
MCV RBC AUTO: 72.5 FL — LOW (ref 80–94)
MCV RBC AUTO: 73.4 FL — LOW (ref 80–94)
MONOCYTES # BLD AUTO: 0.7 K/UL — SIGNIFICANT CHANGE UP (ref 0–0.8)
MONOCYTES NFR BLD AUTO: 6.6 % — SIGNIFICANT CHANGE UP (ref 3–10)
NEUTROPHILS # BLD AUTO: 9.2 K/UL — HIGH (ref 1.8–8)
NEUTROPHILS NFR BLD AUTO: 82.5 % — HIGH (ref 37–73)
NITRITE UR-MCNC: NEGATIVE — SIGNIFICANT CHANGE UP
PH UR: 5 — SIGNIFICANT CHANGE UP (ref 5–8)
PHOSPHATE SERPL-MCNC: 1.9 MG/DL — LOW (ref 2.4–4.7)
PHOSPHATE SERPL-MCNC: 2 MG/DL — LOW (ref 2.4–4.7)
PHOSPHATE SERPL-MCNC: 3.5 MG/DL — SIGNIFICANT CHANGE UP (ref 2.4–4.7)
PLATELET # BLD AUTO: 253 K/UL — SIGNIFICANT CHANGE UP (ref 150–400)
PLATELET # BLD AUTO: 253 K/UL — SIGNIFICANT CHANGE UP (ref 150–400)
POTASSIUM SERPL-MCNC: 3.2 MMOL/L — LOW (ref 3.5–5.3)
POTASSIUM SERPL-MCNC: 3.8 MMOL/L — SIGNIFICANT CHANGE UP (ref 3.5–5.3)
POTASSIUM SERPL-MCNC: 3.8 MMOL/L — SIGNIFICANT CHANGE UP (ref 3.5–5.3)
POTASSIUM SERPL-MCNC: 3.9 MMOL/L — SIGNIFICANT CHANGE UP (ref 3.5–5.3)
POTASSIUM SERPL-SCNC: 3.2 MMOL/L — LOW (ref 3.5–5.3)
POTASSIUM SERPL-SCNC: 3.8 MMOL/L — SIGNIFICANT CHANGE UP (ref 3.5–5.3)
POTASSIUM SERPL-SCNC: 3.8 MMOL/L — SIGNIFICANT CHANGE UP (ref 3.5–5.3)
POTASSIUM SERPL-SCNC: 3.9 MMOL/L — SIGNIFICANT CHANGE UP (ref 3.5–5.3)
PROT SERPL-MCNC: 8.4 G/DL — SIGNIFICANT CHANGE UP (ref 6.6–8.7)
PROT UR-MCNC: 100 MG/DL
PROTHROM AB SERPL-ACNC: 14.7 SEC — HIGH (ref 10–12.9)
RBC # BLD: 3.46 M/UL — LOW (ref 4.6–6.2)
RBC # BLD: 3.67 M/UL — LOW (ref 4.6–6.2)
RBC # FLD: 18.7 % — HIGH (ref 11–15.6)
RBC # FLD: 19.1 % — HIGH (ref 11–15.6)
RBC CASTS # UR COMP ASSIST: SIGNIFICANT CHANGE UP /HPF (ref 0–4)
SODIUM SERPL-SCNC: 143 MMOL/L — SIGNIFICANT CHANGE UP (ref 135–145)
SODIUM SERPL-SCNC: 145 MMOL/L — SIGNIFICANT CHANGE UP (ref 135–145)
SODIUM SERPL-SCNC: 148 MMOL/L — HIGH (ref 135–145)
SODIUM SERPL-SCNC: 149 MMOL/L — HIGH (ref 135–145)
SP GR SPEC: 1.01 — SIGNIFICANT CHANGE UP (ref 1.01–1.02)
TROPONIN T SERPL-MCNC: 0.02 NG/ML — SIGNIFICANT CHANGE UP (ref 0–0.06)
UROBILINOGEN FLD QL: 1 MG/DL
WBC # BLD: 11.2 K/UL — HIGH (ref 4.8–10.8)
WBC # BLD: 12.4 K/UL — HIGH (ref 4.8–10.8)
WBC # FLD AUTO: 11.2 K/UL — HIGH (ref 4.8–10.8)
WBC # FLD AUTO: 12.4 K/UL — HIGH (ref 4.8–10.8)
WBC UR QL: NEGATIVE — SIGNIFICANT CHANGE UP

## 2019-05-30 PROCEDURE — 99291 CRITICAL CARE FIRST HOUR: CPT

## 2019-05-30 PROCEDURE — 70498 CT ANGIOGRAPHY NECK: CPT | Mod: 26

## 2019-05-30 PROCEDURE — 71045 X-RAY EXAM CHEST 1 VIEW: CPT | Mod: 26

## 2019-05-30 PROCEDURE — 99223 1ST HOSP IP/OBS HIGH 75: CPT

## 2019-05-30 PROCEDURE — 22310 CLOSED TX VERT FX W/O MANJ: CPT

## 2019-05-30 PROCEDURE — 99222 1ST HOSP IP/OBS MODERATE 55: CPT | Mod: 57

## 2019-05-30 PROCEDURE — 93010 ELECTROCARDIOGRAM REPORT: CPT

## 2019-05-30 PROCEDURE — 70496 CT ANGIOGRAPHY HEAD: CPT | Mod: 26

## 2019-05-30 PROCEDURE — 99222 1ST HOSP IP/OBS MODERATE 55: CPT | Mod: AI

## 2019-05-30 RX ORDER — HEPARIN SODIUM 5000 [USP'U]/ML
5000 INJECTION INTRAVENOUS; SUBCUTANEOUS EVERY 8 HOURS
Refills: 0 | Status: DISCONTINUED | OUTPATIENT
Start: 2019-05-30 | End: 2019-06-07

## 2019-05-30 RX ORDER — INSULIN GLARGINE 100 [IU]/ML
10 INJECTION, SOLUTION SUBCUTANEOUS AT BEDTIME
Refills: 0 | Status: DISCONTINUED | OUTPATIENT
Start: 2019-05-30 | End: 2019-05-31

## 2019-05-30 RX ORDER — MAGNESIUM SULFATE 500 MG/ML
2 VIAL (ML) INJECTION
Refills: 0 | Status: COMPLETED | OUTPATIENT
Start: 2019-05-30 | End: 2019-05-30

## 2019-05-30 RX ORDER — POTASSIUM CHLORIDE 20 MEQ
10 PACKET (EA) ORAL
Refills: 0 | Status: COMPLETED | OUTPATIENT
Start: 2019-05-30 | End: 2019-05-30

## 2019-05-30 RX ORDER — SODIUM CHLORIDE 9 MG/ML
2000 INJECTION, SOLUTION INTRAVENOUS ONCE
Refills: 0 | Status: COMPLETED | OUTPATIENT
Start: 2019-05-30 | End: 2019-05-30

## 2019-05-30 RX ORDER — POTASSIUM CHLORIDE 20 MEQ
10 PACKET (EA) ORAL
Refills: 0 | Status: COMPLETED | OUTPATIENT
Start: 2019-05-30 | End: 2019-05-31

## 2019-05-30 RX ORDER — LABETALOL HCL 100 MG
5 TABLET ORAL ONCE
Refills: 0 | Status: DISCONTINUED | OUTPATIENT
Start: 2019-05-30 | End: 2019-05-30

## 2019-05-30 RX ORDER — SODIUM CHLORIDE 9 MG/ML
1000 INJECTION, SOLUTION INTRAVENOUS
Refills: 0 | Status: DISCONTINUED | OUTPATIENT
Start: 2019-05-30 | End: 2019-05-30

## 2019-05-30 RX ORDER — IPRATROPIUM/ALBUTEROL SULFATE 18-103MCG
3 AEROSOL WITH ADAPTER (GRAM) INHALATION EVERY 6 HOURS
Refills: 0 | Status: DISCONTINUED | OUTPATIENT
Start: 2019-05-30 | End: 2019-05-31

## 2019-05-30 RX ORDER — DILTIAZEM HCL 120 MG
10 CAPSULE, EXT RELEASE 24 HR ORAL ONCE
Refills: 0 | Status: COMPLETED | OUTPATIENT
Start: 2019-05-30 | End: 2019-05-30

## 2019-05-30 RX ORDER — METOPROLOL TARTRATE 50 MG
5 TABLET ORAL EVERY 6 HOURS
Refills: 0 | Status: DISCONTINUED | OUTPATIENT
Start: 2019-05-30 | End: 2019-05-31

## 2019-05-30 RX ORDER — POTASSIUM PHOSPHATE, MONOBASIC POTASSIUM PHOSPHATE, DIBASIC 236; 224 MG/ML; MG/ML
30 INJECTION, SOLUTION INTRAVENOUS ONCE
Refills: 0 | Status: COMPLETED | OUTPATIENT
Start: 2019-05-30 | End: 2019-05-30

## 2019-05-30 RX ORDER — INSULIN HUMAN 100 [IU]/ML
10 INJECTION, SOLUTION SUBCUTANEOUS ONCE
Refills: 0 | Status: COMPLETED | OUTPATIENT
Start: 2019-05-30 | End: 2019-05-30

## 2019-05-30 RX ORDER — METOPROLOL TARTRATE 50 MG
5 TABLET ORAL ONCE
Refills: 0 | Status: COMPLETED | OUTPATIENT
Start: 2019-05-30 | End: 2019-05-30

## 2019-05-30 RX ORDER — MAGNESIUM SULFATE 500 MG/ML
2 VIAL (ML) INJECTION ONCE
Refills: 0 | Status: COMPLETED | OUTPATIENT
Start: 2019-05-30 | End: 2019-05-30

## 2019-05-30 RX ORDER — CHLORHEXIDINE GLUCONATE 213 G/1000ML
1 SOLUTION TOPICAL DAILY
Refills: 0 | Status: DISCONTINUED | OUTPATIENT
Start: 2019-05-30 | End: 2019-06-01

## 2019-05-30 RX ORDER — TIOTROPIUM BROMIDE 18 UG/1
1 CAPSULE ORAL; RESPIRATORY (INHALATION) DAILY
Refills: 0 | Status: DISCONTINUED | OUTPATIENT
Start: 2019-05-30 | End: 2019-05-31

## 2019-05-30 RX ADMIN — SODIUM CHLORIDE 150 MILLILITER(S): 9 INJECTION INTRAMUSCULAR; INTRAVENOUS; SUBCUTANEOUS at 00:12

## 2019-05-30 RX ADMIN — Medication 100 MILLIEQUIVALENT(S): at 11:00

## 2019-05-30 RX ADMIN — Medication 10 MILLIGRAM(S): at 01:01

## 2019-05-30 RX ADMIN — Medication 50 GRAM(S): at 12:17

## 2019-05-30 RX ADMIN — SODIUM CHLORIDE 125 MILLILITER(S): 9 INJECTION, SOLUTION INTRAVENOUS at 15:45

## 2019-05-30 RX ADMIN — Medication 5 MILLIGRAM(S): at 23:10

## 2019-05-30 RX ADMIN — Medication 100 MILLIEQUIVALENT(S): at 18:45

## 2019-05-30 RX ADMIN — Medication 50 GRAM(S): at 15:02

## 2019-05-30 RX ADMIN — Medication 50 GRAM(S): at 14:03

## 2019-05-30 RX ADMIN — INSULIN HUMAN 9 UNIT(S)/HR: 100 INJECTION, SOLUTION SUBCUTANEOUS at 00:12

## 2019-05-30 RX ADMIN — Medication 100 MILLIEQUIVALENT(S): at 07:53

## 2019-05-30 RX ADMIN — Medication 50 GRAM(S): at 19:59

## 2019-05-30 RX ADMIN — Medication 5 MILLIGRAM(S): at 01:10

## 2019-05-30 RX ADMIN — INSULIN HUMAN 10 UNIT(S): 100 INJECTION, SOLUTION SUBCUTANEOUS at 00:15

## 2019-05-30 RX ADMIN — Medication 100 MILLIEQUIVALENT(S): at 23:10

## 2019-05-30 RX ADMIN — HEPARIN SODIUM 5000 UNIT(S): 5000 INJECTION INTRAVENOUS; SUBCUTANEOUS at 23:10

## 2019-05-30 RX ADMIN — Medication 100 MILLIEQUIVALENT(S): at 15:45

## 2019-05-30 RX ADMIN — INSULIN GLARGINE 10 UNIT(S): 100 INJECTION, SOLUTION SUBCUTANEOUS at 23:37

## 2019-05-30 RX ADMIN — HEPARIN SODIUM 5000 UNIT(S): 5000 INJECTION INTRAVENOUS; SUBCUTANEOUS at 05:32

## 2019-05-30 RX ADMIN — Medication 100 MILLIEQUIVALENT(S): at 17:45

## 2019-05-30 RX ADMIN — SODIUM CHLORIDE 1000 MILLILITER(S): 9 INJECTION INTRAMUSCULAR; INTRAVENOUS; SUBCUTANEOUS at 00:12

## 2019-05-30 RX ADMIN — CHLORHEXIDINE GLUCONATE 1 APPLICATION(S): 213 SOLUTION TOPICAL at 12:17

## 2019-05-30 RX ADMIN — SODIUM CHLORIDE 666.67 MILLILITER(S): 9 INJECTION, SOLUTION INTRAVENOUS at 12:35

## 2019-05-30 RX ADMIN — Medication 100 MILLIEQUIVALENT(S): at 09:15

## 2019-05-30 RX ADMIN — POTASSIUM PHOSPHATE, MONOBASIC POTASSIUM PHOSPHATE, DIBASIC 83.33 MILLIMOLE(S): 236; 224 INJECTION, SOLUTION INTRAVENOUS at 15:03

## 2019-05-30 RX ADMIN — INSULIN HUMAN 5 UNIT(S)/HR: 100 INJECTION, SOLUTION SUBCUTANEOUS at 02:25

## 2019-05-30 RX ADMIN — HEPARIN SODIUM 5000 UNIT(S): 5000 INJECTION INTRAVENOUS; SUBCUTANEOUS at 14:05

## 2019-05-30 NOTE — PROGRESS NOTE ADULT - SUBJECTIVE AND OBJECTIVE BOX
Attempted central venous access after multiple attempts at peripheral access unsuccessful. Attempted to canulate L subclavian vein, good blood return x3 attempts however unable to advance wire on any attempt. CXR done no pneumothorax. Will access chemo port for now. will still require IV access for CTA. D/w ICU attending

## 2019-05-30 NOTE — CONSULT NOTE ADULT - SUBJECTIVE AND OBJECTIVE BOX
PALLIATIVE CONSULT    CC: Patient is a 72y old  Male who presents with a chief complaint of Traumatic fall with neck injury (29 May 2019 23:55)    HPI:  Updated information from SICU  Pt's name is Cyril Rocha,  1950, MRN [id number]  Pt is nonverbal and unable to provide history, info from chart review and staff.    Mr. Rocha is a 69 year old male with PMHx as listed admitted to SICU on  s/p fall down 12 stairs brought in by friends. Trauma protocol initiated. He was found to have a C6 transverse process fracture and DKA requiring insulin gtt. Imaging also revealed bilateral lung masses, extensive LAD, hepatic and osseous lytic lesion concerning for mets and ?colon mass. Per oncology, Dr. Arana, noted back from 2017 pt was on 2nd line tx with nivolumab with mix response with plans to resume on discharge. Palliative consulted for support and goc.     Unable to obtain ROS as pt is nonverbal. He is able to communicate to some questions via nodding yes/no. He denied any pain. Per staff, reptitively repeats "water."    PMHx/PSHx  Stage IV NSCLC on chemotherapy based on onc note in 2017  Hepatitis C: per chart no previous tx noted. h/o IVDA as teenager  Lung nodule  HLD/HTN  DM  Portacath in place: 2016  Former smoker  No significant past surgical history      SOCIAL HISTORY:    Admitted from:  home       Baseline ADLs (prior to admission):  unknown baseline  [place]:  %    Surrogate/HCP/Guardian: Phone#:  - per chart HCP is son Cruz Rocha - [phone]; however, SICU team called and spoke with him, confirms he is NOT HCP and deferred to pt's joselorcaroline García [phone]    ADVANCE DIRECTIVES:   DNR YES NO  Completed on:                     MOLST  YES NO   Completed on:  Living Will  YES NO   Completed on:    FAMILY HISTORY:  No pertinent family history in first degree relatives  Unable to obtain due to being nonverbal     Allergies    Allergy Status Unknown    Intolerances        MEDICATIONS  (STANDING):  chlorhexidine 2% Cloths 1 Application(s) Topical daily  heparin  Injectable 5000 Unit(s) SubCutaneous every 8 hours  insulin regular Infusion 6 Unit(s)/Hr (6 mL/Hr) IV Continuous <Continuous>  lactated ringers. 1000 milliLiter(s) (125 mL/Hr) IV Continuous <Continuous>  magnesium sulfate  IVPB 2 Gram(s) IV Intermittent once  potassium chloride  10 mEq/100 mL IVPB 10 milliEquivalent(s) IV Intermittent every 1 hour    MEDICATIONS  (PRN):      PHYSICAL EXAM:    Vital Signs Last 24 Hrs  T(C): 36.9 (30 May 2019 14:00), Max: 36.9 (30 May 2019 14:00)  T(F): 98.4 (30 May 2019 14:00), Max: 98.4 (30 May 2019 14:00)  HR: 88 (30 May 2019 16:00) (71 - 120)  BP: 135/78 (30 May 2019 16:00) (135/78 - 171/81)  BP(mean): 101 (30 May 2019 16:00) (98 - 123)  RR: 18 (30 May 2019 16:00) (18 - 26)  SpO2: 98% (30 May 2019 16:00) (93% - 99%)    General: Resting comfortably. No acute distress.   HEENT: mucous membrane moist  +neck collar in place  Lungs: ctab. no rales, rhonchi, wheezing. non-labored.    CV: +s1/s2. RRR   +mediport  GI:+ bowel sound. abdomen soft, non-tender, non-distended.  MSK: Moves all 4 extremities.  No cyanosis or edema. weakness.   Neuro: wakes to verbal stimuli, follows simple commands   Skin: warm and dry.      LABS:                        8.1    11.2  )-----------( 253      ( 30 May 2019 06:14 )             26.6     05-30    145  |  108<H>  |  15.0  ----------------------------<  238<H>  3.8   |  19.0<L>  |  0.50    Ca    8.5<L>      30 May 2019 13:50  Phos  2.0     05-30  Mg     1.8     05-30    TPro  8.4  /  Alb  2.5<L>  /  TBili  0.3<L>  /  DBili  x   /  AST  15  /  ALT  9   /  AlkPhos  123<H>  05-30    PT/INR - ( 30 May 2019 00:30 )   PT: 14.7 sec;   INR: 1.27 ratio         PTT - ( 29 May 2019 19:01 )  PTT:32.5 sec  Urinalysis Basic - ( 30 May 2019 03:18 )    Color: Yellow / Appearance: Clear / S.015 / pH: x  Gluc: x / Ketone: Moderate  / Bili: Negative / Urobili: 1 mg/dL   Blood: x / Protein: 100 mg/dL / Nitrite: Negative   Leuk Esterase: Negative / RBC: 0-2 /HPF / WBC Negative   Sq Epi: x / Non Sq Epi: Few / Bacteria: x      I&O's Summary    29 May 2019 07:01  -  30 May 2019 07:00  --------------------------------------------------------  IN: 916 mL / OUT: 210 mL / NET: 706 mL    30 May 2019 07:  -  30 May 2019 16:27  --------------------------------------------------------  IN: 3740.6 mL / OUT: 875 mL / NET: 2865.6 mL      RADIOLOGY & ADDITIONAL STUDIES:   CT C/A/P 19  IMPRESSION:   Bilateral pulmonary masses and extensive mediastinal/abdominal   adenopathy, consistent with neoplasm.  Hepatic lesion, consistent with metastasis.  Mural thickening of the ascending colon, possibly neoplasm.  Possible lytic osseous metastases.    CT C-spine 19  Impression: Fracture of RIGHT C6 transverse process.  Degenerative spondylosis.    CTH 19  IMPRESSION:  No acute intracranial findings.    CXR 19  IMPRESSION:   Large RIGHT perihilar infiltrating mass and LEFT medial   basilar opacities consistent with carcinoma as depicted on subsequent   chest CT scan 2019 7:33 PM

## 2019-05-30 NOTE — CONSULT NOTE ADULT - ASSESSMENT
Mr. Rocha is a 69M with known advance NSCLC admitted s/p fall with C6 transverse process fracture and DKA.     PLAN    DKA  - c/w IVF, insulin gtt, fingersticks, management per SICU    C6 Transverse Process Fracture  - ortho following, note reviewed, pending CTA   - pain appears to be controlled as no clinical signs of distress  - c/w pain control, suggest IV tylenol 1 mg q6 PRN, IV morphine 1-2 mg q4H PRN mild/mod pain while pt is NPO    Nonverbal  - unclear baseline, CTH w/o acute pathology  - c/w supportive care    NSCLC with mets to bone/liver/LN/?colon mass  - imaging reviewed  - per chart previously on chemotherapy followed by Dr. Ramsay/[facility], s/p RT  - recommend onc consult for further input    Palliative Care Encounter  Pt's son Cruz contacted by SICU team and deferred to pt's landlord  Lenard García. Lenard to bring in additional information regarding pt's medical history and medications. Will follow up to for ongoing goc.

## 2019-05-30 NOTE — CHART NOTE - NSCHARTNOTEFT_GEN_A_CORE
UPDATED PATIENT INFORMATION        - 54  MRN [id number] ([facility] previous admission) same house address and last name per register's office compared to EMS record       SICU team contacted patient's son Cruz Rocha [phone] currently in [place]. States that he is NOT the health care proxy. Recommended SICU team to speak to damian García [phone] who informed team that most of the patient's care has been at [facility] and that he's had Hx of lung CA s/p radiation. Damian currently gathering patient's PCP name, medical Hx and pill bottles and will relay this info to the team.

## 2019-05-30 NOTE — PROGRESS NOTE ADULT - SUBJECTIVE AND OBJECTIVE BOX
Received 3L bolus in ED prior to arrival in ICU. Non verbal patient (unknown if this is baseline) Pending CTA neck and repeat CT head once IV access is established.     SUBJECTIVE/ROS:  [ ] A ten-point review of systems was otherwise negative except as noted.  [x] Due to altered mental status/intubation, subjective information were not able to be obtained from the patient. History was obtained, to the extent possible, from review of the chart and collateral sources of information.    Constitutional: Elderly patient resting comfortably in bed in no acute distress   HEENT: EOMI/PERRL b/l  Neck: C collar present   Respiratory: CTAB with unlabored respirations,   Cardiovascular: Normal S1 and S2   Gastrointestinal: Abdomen soft, non-tender, non-distended with no rebound tenderness or guarding   Rectal: Not indicated   Neurological: GCS 11 (E4V1M6) with no gross sensory, motor or coordination deficits   Psychiatric: Normal mood and affect   Musculoskeletal: No joint pain, swelling or deformity     NEURO  RASS:     GCS: 11    CAM ICU:  Exam:   Meds:   [x] Adequacy of sedation and pain control has been assessed and adjusted    RESPIRATORY  RR: 21 (05-30-19 @ 11:38) (18 - 26)  SpO2: 98% (05-30-19 @ 11:38) (93% - 99%)  Wt(kg): --  Exam: unlabored, clear to auscultation bilaterally  Mechanical Ventilation:     CARDIOVASCULAR  HR: 91 (05-30-19 @ 11:38) (71 - 120)  BP: 158/82 (05-30-19 @ 11:00) (141/81 - 171/81)  BP(mean): 112 (05-30-19 @ 11:00) (98 - 123)  ABP: --  ABP(mean): --  Wt(kg): --  CVP(cm H2O): --  VBG - ( 30 May 2019 00:32 )  pH: x     /  pCO2: x     /  pO2: x     / HCO3: x     / Base Excess: x     /  SaO2: x      Lactate: 2.0        Exam:  Cardiac Rhythm: NSR   Perfusion     [x]Adequate   [ ]Inadequate  Mentation   [ ]Normal       [x]Reduced  Extremities  [x]Warm         [ ]Cool  Volume Status [ ]Hypervolemic [ ]Euvolemic [x]Hypovolemic  Meds:     GI/NUTRITION  Diet: NPO   Meds:     GENITOURINARY  I&O's Detail    05-29 @ 07:01  -  05-30 @ 07:00  --------------------------------------------------------  IN:    insulin regular Infusion: 11 mL    insulin regular Infusion: 5 mL    multiple electrolytes Injection Type 1: 450 mL    sodium chloride 0.9%: 450 mL  Total IN: 916 mL    OUT:    Voided: 210 mL  Total OUT: 210 mL    Total NET: 706 mL      05-30 @ 07:01 - 05-30 @ 12:03  --------------------------------------------------------  IN:    insulin regular Infusion: 3 mL    multiple electrolytes Injection Type 1: 600 mL    Solution: 200 mL  Total IN: 803 mL    OUT:    Voided: 200 mL  Total OUT: 200 mL    Total NET: 603 mL    05-30    148<H>  |  113<H>  |  19.0  ----------------------------<  203<H>  3.2<L>   |  21.0<L>  |  0.71    Ca    9.7      30 May 2019 06:14  Phos  1.9     05-30  Mg     1.4     05-30    TPro  8.4  /  Alb  2.5<L>  /  TBili  0.3<L>  /  DBili  x   /  AST  15  /  ALT  9   /  AlkPhos  123<H>  05-30    [ ] Dunn catheter, indication: N/A  Meds: magnesium sulfate  IVPB 2 Gram(s) IV Intermittent every 1 hour  multiple electrolytes Injection Type 1 1000 milliLiter(s) IV Continuous <Continuous>  multiple electrolytes Injection Type 1 Bolus 2000 milliLiter(s) IV Bolus once  potassium chloride  10 mEq/100 mL IVPB 10 milliEquivalent(s) IV Intermittent every 1 hour  potassium phosphate IVPB 30 milliMole(s) IV Intermittent once    HEMATOLOGIC  Meds: heparin  Injectable 5000 Unit(s) SubCutaneous every 8 hours    [x] VTE Prophylaxis                        8.1    11.2  )-----------( 253      ( 30 May 2019 06:14 )             26.6     PT/INR - ( 30 May 2019 00:30 )   PT: 14.7 sec;   INR: 1.27 ratio         PTT - ( 29 May 2019 19:01 )  PTT:32.5 sec  Transfusion     [ ] PRBC   [ ] Platelets   [ ] FFP   [ ] Cryoprecipitate      INFECTIOUS DISEASES  T(C): 36.8 (05-30-19 @ 08:00), Max: 36.8 (05-30-19 @ 08:00)  Wt(kg): --  WBC Count: 11.2 K/uL (05-30 @ 06:14)  WBC Count: 12.4 K/uL (05-30 @ 00:30)  WBC Count: 12.6 K/uL (05-29 @ 19:01)    Recent Cultures:    Meds:       ENDOCRINE  Capillary Blood Glucose    Meds: insulin regular Infusion 3 Unit(s)/Hr IV Continuous <Continuous>    ACCESS DEVICES:  [x] Peripheral IV  [ ] Central Venous Line	[ ] R	[ ] L	[ ] IJ	[ ] Fem	[ ] SC	Placed:   [ ] Arterial Line		[ ] R	[ ] L	[ ] Fem	[ ] Rad	[ ] Ax	Placed:   [ ] PICC:					[ ] Mediport  [ ] Urinary Catheter, Date Placed:   [ ] Necessity of urinary, arterial, and venous catheters discussed    OTHER MEDICATIONS:  chlorhexidine 2% Cloths 1 Application(s) Topical daily Received 3L bolus in ED prior to arrival in ICU. Non verbal patient (unknown if this is baseline) Pending CTA neck and repeat CT head once IV access is established.     SUBJECTIVE/ROS:  [ ] A ten-point review of systems was otherwise negative except as noted.  [x] Due to altered mental status/intubation, subjective information were not able to be obtained from the patient. History was obtained, to the extent possible, from review of the chart and collateral sources of information.    Constitutional: Elderly patient resting comfortably in bed in no acute distress   HEENT: EOMI/PERRL b/l  Neck: C collar present   Respiratory: R sided port present, CTAB with unlabored respirations    Cardiovascular: Normal S1 and S2   Gastrointestinal: Abdomen soft, non-tender, non-distended with no rebound tenderness or guarding   Rectal: Not indicated   Neurological: GCS 11 (E4V1M6) with no gross sensory, motor or coordination deficits   Psychiatric: Normal mood and affect   Musculoskeletal: No joint pain, swelling or deformity     NEURO  RASS:     GCS: 11    CAM ICU:  Exam:   Meds:   [x] Adequacy of sedation and pain control has been assessed and adjusted    RESPIRATORY  RR: 21 (05-30-19 @ 11:38) (18 - 26)  SpO2: 98% (05-30-19 @ 11:38) (93% - 99%)  Wt(kg): --  Exam: unlabored, clear to auscultation bilaterally  Mechanical Ventilation:     CARDIOVASCULAR  HR: 91 (05-30-19 @ 11:38) (71 - 120)  BP: 158/82 (05-30-19 @ 11:00) (141/81 - 171/81)  BP(mean): 112 (05-30-19 @ 11:00) (98 - 123)  ABP: --  ABP(mean): --  Wt(kg): --  CVP(cm H2O): --  VBG - ( 30 May 2019 00:32 )  pH: x     /  pCO2: x     /  pO2: x     / HCO3: x     / Base Excess: x     /  SaO2: x      Lactate: 2.0        Exam:  Cardiac Rhythm: NSR   Perfusion     [x]Adequate   [ ]Inadequate  Mentation   [ ]Normal       [x]Reduced  Extremities  [x]Warm         [ ]Cool  Volume Status [ ]Hypervolemic [ ]Euvolemic [x]Hypovolemic  Meds:     GI/NUTRITION  Diet: NPO   Meds:     GENITOURINARY  I&O's Detail    05-29 @ 07:01 - 05-30 @ 07:00  --------------------------------------------------------  IN:    insulin regular Infusion: 11 mL    insulin regular Infusion: 5 mL    multiple electrolytes Injection Type 1: 450 mL    sodium chloride 0.9%: 450 mL  Total IN: 916 mL    OUT:    Voided: 210 mL  Total OUT: 210 mL    Total NET: 706 mL      05-30 @ 07:01 - 05-30 @ 12:03  --------------------------------------------------------  IN:    insulin regular Infusion: 3 mL    multiple electrolytes Injection Type 1: 600 mL    Solution: 200 mL  Total IN: 803 mL    OUT:    Voided: 200 mL  Total OUT: 200 mL    Total NET: 603 mL    05-30    148<H>  |  113<H>  |  19.0  ----------------------------<  203<H>  3.2<L>   |  21.0<L>  |  0.71    Ca    9.7      30 May 2019 06:14  Phos  1.9     05-30  Mg     1.4     05-30    TPro  8.4  /  Alb  2.5<L>  /  TBili  0.3<L>  /  DBili  x   /  AST  15  /  ALT  9   /  AlkPhos  123<H>  05-30    [ ] Dunn catheter, indication: N/A  Meds: magnesium sulfate  IVPB 2 Gram(s) IV Intermittent every 1 hour  multiple electrolytes Injection Type 1 1000 milliLiter(s) IV Continuous <Continuous>  multiple electrolytes Injection Type 1 Bolus 2000 milliLiter(s) IV Bolus once  potassium chloride  10 mEq/100 mL IVPB 10 milliEquivalent(s) IV Intermittent every 1 hour  potassium phosphate IVPB 30 milliMole(s) IV Intermittent once    HEMATOLOGIC  Meds: heparin  Injectable 5000 Unit(s) SubCutaneous every 8 hours    [x] VTE Prophylaxis                        8.1    11.2  )-----------( 253      ( 30 May 2019 06:14 )             26.6     PT/INR - ( 30 May 2019 00:30 )   PT: 14.7 sec;   INR: 1.27 ratio         PTT - ( 29 May 2019 19:01 )  PTT:32.5 sec  Transfusion     [ ] PRBC   [ ] Platelets   [ ] FFP   [ ] Cryoprecipitate      INFECTIOUS DISEASES  T(C): 36.8 (05-30-19 @ 08:00), Max: 36.8 (05-30-19 @ 08:00)  Wt(kg): --  WBC Count: 11.2 K/uL (05-30 @ 06:14)  WBC Count: 12.4 K/uL (05-30 @ 00:30)  WBC Count: 12.6 K/uL (05-29 @ 19:01)    Recent Cultures:    Meds:       ENDOCRINE  Capillary Blood Glucose    Meds: insulin regular Infusion 3 Unit(s)/Hr IV Continuous <Continuous>    ACCESS DEVICES:  [x] Peripheral IV  [ ] Central Venous Line	[ ] R	[ ] L	[ ] IJ	[ ] Fem	[ ] SC	Placed:   [ ] Arterial Line		[ ] R	[ ] L	[ ] Fem	[ ] Rad	[ ] Ax	Placed:   [ ] PICC:					[ ] Mediport  [ ] Urinary Catheter, Date Placed:   [ ] Necessity of urinary, arterial, and venous catheters discussed    OTHER MEDICATIONS:  chlorhexidine 2% Cloths 1 Application(s) Topical daily

## 2019-05-30 NOTE — CHART NOTE - NSCHARTNOTEFT_GEN_A_CORE
No family present to consent for the CT scan with IV contrast hence emergent nature of imaging deemed necessary to proceed at this time.

## 2019-05-30 NOTE — PROGRESS NOTE ADULT - ASSESSMENT
72 year old male s/p fall down stairs found to have C6 transverse process fracture     Neuro:     CV:     Pulm:     GI/Nutrition:     /Renal:     ID:     Endo:     Skin: Repositioning for DTI prevention while in bed    DVT Prophylaxis: SCDs    Lines/Tubes:     Dispo:     CODE STATUS:     IMAGING:    ____ minutes of critical care time spent providing medical care for patient's acute illness/conditions that impairs at least one vital organ system and/or poses a high risk of imminent or life threatening deterioration in the patient's condition. It includes time spent evaluating and treating the patient's acute illness as well as time spent reviewing labs, radiology, discussing goals of care with patient and/or patient's family, and discussing the case with a multidisciplinary team in an effort to prevent further life threatening deterioration or end organ damage. This time is independent of any procedures performed. 72 year old male s/p fall down stairs found to have C6 transverse process fracture found to be in DKA requiring insulin gtt with last finegrsticks in 250 range    Neuro: Non verbal at baseline - unable to adequately assess pain and does not appear to be in pain at this time     CV: NSR with no active issues at this time     Pulm: R sided port in place - unknown if still in use     GI/Nutrition: Currently NPO pending further imaging studies     /Renal: UOP noted to be 30-50cc/hr; Cr 0.71; plan to give 2L bolus now for volume resuscitation and obtain repeat labs in PM      ID: No indication for IV ABx at this time     Endo: Insulin gtt for DKA - anion gap on admission 24 now 14    Skin: Repositioning for DTI prevention while in bed    DVT Prophylaxis: SCDs and subQ hep present for chemical DVT PPx    Lines/Tubes: Establish peripheral access    Dispo: Needs continued ICU care given need for insulin gtt     Misc: Attempt at contacting family made - brother in [place] (not the health care proxy; confirmed Hx of lung CA) Team continuing attempts at reaching family for further details on patient's medical Hx      CODE STATUS: Full

## 2019-05-30 NOTE — PROGRESS NOTE ADULT - SUBJECTIVE AND OBJECTIVE BOX
[facility]    [id number]    History: 72y Male with C6 transverse process fx. Patient is alert and follows commands but in non-verbal and unable to provide a history.  The patient is in no apparent distress.                          8.1    11.2  )-----------( 253      ( 30 May 2019 06:14 )             26.6     05-30    148<H>  |  113<H>  |  19.0  ----------------------------<  203<H>  3.2<L>   |  21.0<L>  |  0.71    Ca    9.7      30 May 2019 06:14  Phos  1.9     05-30  Mg     1.4     05-30    TPro  8.4  /  Alb  2.5<L>  /  TBili  0.3<L>  /  DBili  x   /  AST  15  /  ALT  9   /  AlkPhos  123<H>  05-30      MEDICATIONS  (STANDING):  chlorhexidine 2% Cloths 1 Application(s) Topical daily  heparin  Injectable 5000 Unit(s) SubCutaneous every 8 hours  insulin regular Infusion 3 Unit(s)/Hr (3 mL/Hr) IV Continuous <Continuous>  magnesium sulfate  IVPB 2 Gram(s) IV Intermittent every 1 hour  multiple electrolytes Injection Type 1 1000 milliLiter(s) (150 mL/Hr) IV Continuous <Continuous>  potassium chloride  10 mEq/100 mL IVPB 10 milliEquivalent(s) IV Intermittent every 1 hour  potassium phosphate IVPB 30 milliMole(s) IV Intermittent once    MEDICATIONS  (PRN):      Physical exam: Physical exam limited secondary to pt being non-verbal.    cervical: Gregg collar in place.    b/l UE: Sensation to light touch is grossly intact distally. Pt with 4/5  strength b/l. 2+ radial pulse. Capillary refill is less than 2 seconds. No cyanosis.    Primary Orthopedic Assessment:  • 72y Male with C6 transverse process fx    Secondary  Orthopedic Assessment(s):   •     Secondary  Medical Assessment(s):   •     Plan:   • Pain control as clinically indicated  • DVT prophylaxis as prescribed, including use of compression devices and ankle pumps  • Phili collar at all times  • f/u CTA of cervical spine when stable

## 2019-05-30 NOTE — CHART NOTE - NSCHARTNOTEFT_GEN_A_CORE
Patient went into rapid afib -180's, blood pressure /80's, sating well on NC. Given 5mg Lopressor and a 1L bolus.  Patient immediately converted to NSR HR 80's.  BP remains stable.  Patient continues on insulin gtt.

## 2019-05-30 NOTE — CONSULT NOTE ADULT - ATTENDING COMMENTS
Attending statement:  I have personally seen this patient, and formed a face to face diagnostic evaluation on this patient on this date.  I have reviewed the PA, NP and or Medical/PA student and/or Resident documentation and agree with the history, physical exam and plan of care except if noted otherwise.     The patient is examined this morning on May 30, 2019 in approximately 7 AM. Patient is not alert and oriented at this point time cervical collar is in place. Cervical CAT scan was reviewed with a non-shock she'll spinal cord or spinal injury of the vertebral body/transverse process at C6. I do think a cervical MRI would be beneficial to completely clear his cervical spine secondary to the aspect of the is not alert and orientated x3. Maintain palatal MRI is complete
D/W RN, SICU team,  Lyndsey    Thank you for the opportunity to assist with the care of this patient.   [facility] [phone].

## 2019-05-30 NOTE — ED ADULT NURSE REASSESSMENT NOTE - NS ED NURSE REASSESS COMMENT FT1
Pt remained tachycardic going as high as 180 bpm. ACS called multiple times to come and assess pt heart rate. ED attending MD APARICIO at bedside to assess pt. Pt medicated as ordered. SICU DENNYS Nguyễn, Trauma MD Morales, ED MD APARICIO at bedside, as well as MICU DENNYS Avina at bedside assessing pt status. Pt heart rate at 180 bpm /98, medicated with 10mg of Cardizem, pt remains tachycardic at 160's after medication, BP reassessed at 160/99 and heart rate 160, given 5 mg Lopressor. Pt heart rate came down to 100 bpm, and blood pressure remains stable at 160/92. EKG obtained at this time, and pt converted to regular rhythm with rate at 85 at this time. DENNYS Avina made aware of new blood glucose 392 and insulin gtt to be adjusted as ordered. Pt remained tachycardic going as high as 180 bpm. ACS called multiple times to come and assess pt heart rate. ED attending MD APARICIO at bedside to assess pt. Pt medicated as ordered. SICU DENNYS Nguyễn, Trauma MD Morales, ED MD APARICIO at bedside, as well as MICU DENNYS Avina at bedside assessing pt status. At 0100 pt heart rate at 180 bpm /98, medicated with 10mg of Cardizem, pt remains tachycardic at 160's after medication, 0110 BP reassessed at 160/99 and heart rate 160, given 5 mg Lopressor. Pt heart rate came down to 100 bpm, and blood pressure remains stable at 160/92. EKG obtained at this time, and pt converted to regular rhythm with rate at 85 at this time. DENNYS Avina made aware of new blood glucose 392 and insulin gtt to be adjusted as ordered. DENNYS Nguyễn made aware pt needs consent as per CT RN to preform test as ordered with IV contrast, as per DENNYS Nguyễn will speak with CT scan herself to obtain testing. Pt remained tachycardic going as high as 180 bpm. ACS called multiple times to come and assess pt heart rate. ED attending MD APARICIO at bedside to assess pt. Pt medicated as ordered. At 0100 pt heart rate at 180 bpm /98, medicated with 10mg of Cardizem, pt remains tachycardic at 160's after medication, 0110 BP reassessed at 160/99 and heart rate 160, given 5 mg Lopressor. Pt heart rate came down to 100 bpm, and blood pressure remains stable at 160/92. EKG obtained at this time given to MD HURTADO who was at bedside assessing pt at that time, pt noted to have converted  into a regular rhythm with rate at 85 at this time. After event, SICU DENNYS Nguyễn, Trauma MD Morales, at bedside, as well as MICU DENNYS Avina at bedside assessing pt status.  DENNYS Avina made aware of new blood glucose 392 and insulin gtt to be adjusted as ordered. DENNYS Nguyễn made aware pt needs consent as per CT RN to preform test as ordered with IV contrast, as per DENNYS Nguyễn will speak with CT scan herself to obtain testing.

## 2019-05-31 DIAGNOSIS — S12.501A UNSPECIFIED NONDISPLACED FRACTURE OF SIXTH CERVICAL VERTEBRA, INITIAL ENCOUNTER FOR CLOSED FRACTURE: ICD-10-CM

## 2019-05-31 LAB
ACETONE SERPL-MCNC: ABNORMAL
ANION GAP SERPL CALC-SCNC: 12 MMOL/L — SIGNIFICANT CHANGE UP (ref 5–17)
ANION GAP SERPL CALC-SCNC: 16 MMOL/L — SIGNIFICANT CHANGE UP (ref 5–17)
ANISOCYTOSIS BLD QL: SLIGHT — SIGNIFICANT CHANGE UP
BUN SERPL-MCNC: 12 MG/DL — SIGNIFICANT CHANGE UP (ref 8–20)
BUN SERPL-MCNC: 12 MG/DL — SIGNIFICANT CHANGE UP (ref 8–20)
BURR CELLS BLD QL SMEAR: PRESENT — SIGNIFICANT CHANGE UP
CALCIUM SERPL-MCNC: 9.1 MG/DL — SIGNIFICANT CHANGE UP (ref 8.6–10.2)
CALCIUM SERPL-MCNC: 9.5 MG/DL — SIGNIFICANT CHANGE UP (ref 8.6–10.2)
CHLORIDE SERPL-SCNC: 111 MMOL/L — HIGH (ref 98–107)
CHLORIDE SERPL-SCNC: 112 MMOL/L — HIGH (ref 98–107)
CO2 SERPL-SCNC: 20 MMOL/L — LOW (ref 22–29)
CO2 SERPL-SCNC: 23 MMOL/L — SIGNIFICANT CHANGE UP (ref 22–29)
CREAT SERPL-MCNC: 0.51 MG/DL — SIGNIFICANT CHANGE UP (ref 0.5–1.3)
CREAT SERPL-MCNC: 0.57 MG/DL — SIGNIFICANT CHANGE UP (ref 0.5–1.3)
DACRYOCYTES BLD QL SMEAR: SLIGHT — SIGNIFICANT CHANGE UP
EOSINOPHIL # BLD AUTO: 0 K/UL — SIGNIFICANT CHANGE UP (ref 0–0.5)
EOSINOPHIL NFR BLD AUTO: 0 % — SIGNIFICANT CHANGE UP (ref 0–5)
GLUCOSE BLDC GLUCOMTR-MCNC: 111 MG/DL — HIGH (ref 70–99)
GLUCOSE BLDC GLUCOMTR-MCNC: 138 MG/DL — HIGH (ref 70–99)
GLUCOSE BLDC GLUCOMTR-MCNC: 161 MG/DL — HIGH (ref 70–99)
GLUCOSE BLDC GLUCOMTR-MCNC: 207 MG/DL — HIGH (ref 70–99)
GLUCOSE BLDC GLUCOMTR-MCNC: 207 MG/DL — HIGH (ref 70–99)
GLUCOSE BLDC GLUCOMTR-MCNC: 217 MG/DL — HIGH (ref 70–99)
GLUCOSE BLDC GLUCOMTR-MCNC: 238 MG/DL — HIGH (ref 70–99)
GLUCOSE BLDC GLUCOMTR-MCNC: 248 MG/DL — HIGH (ref 70–99)
GLUCOSE SERPL-MCNC: 181 MG/DL — HIGH (ref 70–115)
GLUCOSE SERPL-MCNC: 214 MG/DL — HIGH (ref 70–115)
HBA1C BLD-MCNC: 12.1 % — HIGH (ref 4–5.6)
HCT VFR BLD CALC: 23.3 % — LOW (ref 42–52)
HCT VFR BLD CALC: 24.8 % — LOW (ref 42–52)
HCV RNA FLD QL NAA+PROBE: SIGNIFICANT CHANGE UP
HCV RNA SPEC QL PROBE+SIG AMP: DETECTED
HGB BLD-MCNC: 7.1 G/DL — LOW (ref 14–18)
HGB BLD-MCNC: 7.4 G/DL — LOW (ref 14–18)
LYMPHOCYTES # BLD AUTO: 1.5 K/UL — SIGNIFICANT CHANGE UP (ref 1–4.8)
LYMPHOCYTES # BLD AUTO: 14.2 % — LOW (ref 20–55)
MAGNESIUM SERPL-MCNC: 2.3 MG/DL — SIGNIFICANT CHANGE UP (ref 1.6–2.6)
MCHC RBC-ENTMCNC: 21.8 PG — LOW (ref 27–31)
MCHC RBC-ENTMCNC: 22.1 PG — LOW (ref 27–31)
MCHC RBC-ENTMCNC: 29.8 G/DL — LOW (ref 32–36)
MCHC RBC-ENTMCNC: 30.5 G/DL — LOW (ref 32–36)
MCV RBC AUTO: 72.6 FL — LOW (ref 80–94)
MCV RBC AUTO: 73.2 FL — LOW (ref 80–94)
MICROCYTES BLD QL: SLIGHT — SIGNIFICANT CHANGE UP
MONOCYTES # BLD AUTO: 1.1 K/UL — HIGH (ref 0–0.8)
MONOCYTES NFR BLD AUTO: 9.8 % — SIGNIFICANT CHANGE UP (ref 3–10)
NEUTROPHILS # BLD AUTO: 8.2 K/UL — HIGH (ref 1.8–8)
NEUTROPHILS NFR BLD AUTO: 75.4 % — HIGH (ref 37–73)
PHOSPHATE SERPL-MCNC: 3.3 MG/DL — SIGNIFICANT CHANGE UP (ref 2.4–4.7)
PLAT MORPH BLD: NORMAL — SIGNIFICANT CHANGE UP
PLATELET # BLD AUTO: 208 K/UL — SIGNIFICANT CHANGE UP (ref 150–400)
PLATELET # BLD AUTO: 245 K/UL — SIGNIFICANT CHANGE UP (ref 150–400)
POIKILOCYTOSIS BLD QL AUTO: SLIGHT — SIGNIFICANT CHANGE UP
POTASSIUM SERPL-MCNC: 4 MMOL/L — SIGNIFICANT CHANGE UP (ref 3.5–5.3)
POTASSIUM SERPL-MCNC: 4.1 MMOL/L — SIGNIFICANT CHANGE UP (ref 3.5–5.3)
POTASSIUM SERPL-SCNC: 4 MMOL/L — SIGNIFICANT CHANGE UP (ref 3.5–5.3)
POTASSIUM SERPL-SCNC: 4.1 MMOL/L — SIGNIFICANT CHANGE UP (ref 3.5–5.3)
RBC # BLD: 3.21 M/UL — LOW (ref 4.6–6.2)
RBC # BLD: 3.39 M/UL — LOW (ref 4.6–6.2)
RBC # FLD: 19.5 % — HIGH (ref 11–15.6)
RBC # FLD: 19.7 % — HIGH (ref 11–15.6)
RBC BLD AUTO: ABNORMAL
SODIUM SERPL-SCNC: 146 MMOL/L — HIGH (ref 135–145)
SODIUM SERPL-SCNC: 148 MMOL/L — HIGH (ref 135–145)
TROPONIN T SERPL-MCNC: 0.02 NG/ML — SIGNIFICANT CHANGE UP (ref 0–0.06)
WBC # BLD: 10 K/UL — SIGNIFICANT CHANGE UP (ref 4.8–10.8)
WBC # BLD: 10.8 K/UL — SIGNIFICANT CHANGE UP (ref 4.8–10.8)
WBC # FLD AUTO: 10 K/UL — SIGNIFICANT CHANGE UP (ref 4.8–10.8)
WBC # FLD AUTO: 10.8 K/UL — SIGNIFICANT CHANGE UP (ref 4.8–10.8)

## 2019-05-31 PROCEDURE — 93306 TTE W/DOPPLER COMPLETE: CPT | Mod: 26

## 2019-05-31 PROCEDURE — 99233 SBSQ HOSP IP/OBS HIGH 50: CPT

## 2019-05-31 PROCEDURE — 99222 1ST HOSP IP/OBS MODERATE 55: CPT

## 2019-05-31 RX ORDER — DEXTROSE 50 % IN WATER 50 %
25 SYRINGE (ML) INTRAVENOUS ONCE
Refills: 0 | Status: DISCONTINUED | OUTPATIENT
Start: 2019-05-31 | End: 2019-06-07

## 2019-05-31 RX ORDER — IPRATROPIUM/ALBUTEROL SULFATE 18-103MCG
3 AEROSOL WITH ADAPTER (GRAM) INHALATION EVERY 6 HOURS
Refills: 0 | Status: DISCONTINUED | OUTPATIENT
Start: 2019-05-31 | End: 2019-06-05

## 2019-05-31 RX ORDER — METOPROLOL TARTRATE 50 MG
25 TABLET ORAL
Refills: 0 | Status: DISCONTINUED | OUTPATIENT
Start: 2019-05-31 | End: 2019-06-02

## 2019-05-31 RX ORDER — DEXTROSE 50 % IN WATER 50 %
12.5 SYRINGE (ML) INTRAVENOUS ONCE
Refills: 0 | Status: DISCONTINUED | OUTPATIENT
Start: 2019-05-31 | End: 2019-06-07

## 2019-05-31 RX ORDER — DEXTROSE 50 % IN WATER 50 %
15 SYRINGE (ML) INTRAVENOUS ONCE
Refills: 0 | Status: DISCONTINUED | OUTPATIENT
Start: 2019-05-31 | End: 2019-05-31

## 2019-05-31 RX ORDER — SIMVASTATIN 20 MG/1
20 TABLET, FILM COATED ORAL AT BEDTIME
Refills: 0 | Status: DISCONTINUED | OUTPATIENT
Start: 2019-05-31 | End: 2019-06-07

## 2019-05-31 RX ORDER — INSULIN GLARGINE 100 [IU]/ML
22 INJECTION, SOLUTION SUBCUTANEOUS AT BEDTIME
Refills: 0 | Status: DISCONTINUED | OUTPATIENT
Start: 2019-05-31 | End: 2019-06-01

## 2019-05-31 RX ORDER — LOSARTAN POTASSIUM 100 MG/1
25 TABLET, FILM COATED ORAL DAILY
Refills: 0 | Status: DISCONTINUED | OUTPATIENT
Start: 2019-05-31 | End: 2019-06-02

## 2019-05-31 RX ORDER — INSULIN GLARGINE 100 [IU]/ML
17 INJECTION, SOLUTION SUBCUTANEOUS AT BEDTIME
Refills: 0 | Status: DISCONTINUED | OUTPATIENT
Start: 2019-05-31 | End: 2019-05-31

## 2019-05-31 RX ORDER — INSULIN LISPRO 100/ML
VIAL (ML) SUBCUTANEOUS EVERY 4 HOURS
Refills: 0 | Status: DISCONTINUED | OUTPATIENT
Start: 2019-05-31 | End: 2019-05-31

## 2019-05-31 RX ORDER — INSULIN LISPRO 100/ML
5 VIAL (ML) SUBCUTANEOUS
Refills: 0 | Status: DISCONTINUED | OUTPATIENT
Start: 2019-05-31 | End: 2019-05-31

## 2019-05-31 RX ORDER — INSULIN LISPRO 100/ML
5 VIAL (ML) SUBCUTANEOUS
Refills: 0 | Status: DISCONTINUED | OUTPATIENT
Start: 2019-05-31 | End: 2019-06-01

## 2019-05-31 RX ORDER — ALBUTEROL 90 UG/1
2.5 AEROSOL, METERED ORAL EVERY 4 HOURS
Refills: 0 | Status: DISCONTINUED | OUTPATIENT
Start: 2019-05-31 | End: 2019-06-05

## 2019-05-31 RX ORDER — DEXTROSE 50 % IN WATER 50 %
25 SYRINGE (ML) INTRAVENOUS ONCE
Refills: 0 | Status: DISCONTINUED | OUTPATIENT
Start: 2019-05-31 | End: 2019-05-31

## 2019-05-31 RX ORDER — SODIUM CHLORIDE 9 MG/ML
1000 INJECTION, SOLUTION INTRAVENOUS
Refills: 0 | Status: DISCONTINUED | OUTPATIENT
Start: 2019-05-31 | End: 2019-06-07

## 2019-05-31 RX ORDER — SODIUM CHLORIDE 9 MG/ML
1000 INJECTION, SOLUTION INTRAVENOUS
Refills: 0 | Status: DISCONTINUED | OUTPATIENT
Start: 2019-05-31 | End: 2019-06-01

## 2019-05-31 RX ORDER — GLUCAGON INJECTION, SOLUTION 0.5 MG/.1ML
1 INJECTION, SOLUTION SUBCUTANEOUS ONCE
Refills: 0 | Status: DISCONTINUED | OUTPATIENT
Start: 2019-05-31 | End: 2019-06-07

## 2019-05-31 RX ORDER — DEXTROSE 50 % IN WATER 50 %
15 SYRINGE (ML) INTRAVENOUS ONCE
Refills: 0 | Status: DISCONTINUED | OUTPATIENT
Start: 2019-05-31 | End: 2019-06-07

## 2019-05-31 RX ORDER — GLUCAGON INJECTION, SOLUTION 0.5 MG/.1ML
1 INJECTION, SOLUTION SUBCUTANEOUS ONCE
Refills: 0 | Status: DISCONTINUED | OUTPATIENT
Start: 2019-05-31 | End: 2019-05-31

## 2019-05-31 RX ORDER — DEXTROSE 50 % IN WATER 50 %
12.5 SYRINGE (ML) INTRAVENOUS ONCE
Refills: 0 | Status: DISCONTINUED | OUTPATIENT
Start: 2019-05-31 | End: 2019-05-31

## 2019-05-31 RX ORDER — INSULIN GLARGINE 100 [IU]/ML
5 INJECTION, SOLUTION SUBCUTANEOUS EVERY MORNING
Refills: 0 | Status: DISCONTINUED | OUTPATIENT
Start: 2019-05-31 | End: 2019-05-31

## 2019-05-31 RX ORDER — CHOLECALCIFEROL (VITAMIN D3) 125 MCG
1000 CAPSULE ORAL DAILY
Refills: 0 | Status: DISCONTINUED | OUTPATIENT
Start: 2019-05-31 | End: 2019-06-07

## 2019-05-31 RX ORDER — INSULIN LISPRO 100/ML
VIAL (ML) SUBCUTANEOUS AT BEDTIME
Refills: 0 | Status: DISCONTINUED | OUTPATIENT
Start: 2019-05-31 | End: 2019-06-07

## 2019-05-31 RX ORDER — SODIUM CHLORIDE 9 MG/ML
1000 INJECTION, SOLUTION INTRAVENOUS
Refills: 0 | Status: DISCONTINUED | OUTPATIENT
Start: 2019-05-31 | End: 2019-05-31

## 2019-05-31 RX ORDER — INSULIN LISPRO 100/ML
VIAL (ML) SUBCUTANEOUS
Refills: 0 | Status: DISCONTINUED | OUTPATIENT
Start: 2019-05-31 | End: 2019-06-07

## 2019-05-31 RX ADMIN — HEPARIN SODIUM 5000 UNIT(S): 5000 INJECTION INTRAVENOUS; SUBCUTANEOUS at 21:47

## 2019-05-31 RX ADMIN — Medication 4: at 14:06

## 2019-05-31 RX ADMIN — INSULIN GLARGINE 22 UNIT(S): 100 INJECTION, SOLUTION SUBCUTANEOUS at 21:47

## 2019-05-31 RX ADMIN — Medication 5 MILLIGRAM(S): at 06:30

## 2019-05-31 RX ADMIN — Medication 100 MILLIEQUIVALENT(S): at 01:00

## 2019-05-31 RX ADMIN — Medication 100 MILLIEQUIVALENT(S): at 00:41

## 2019-05-31 RX ADMIN — HEPARIN SODIUM 5000 UNIT(S): 5000 INJECTION INTRAVENOUS; SUBCUTANEOUS at 14:05

## 2019-05-31 RX ADMIN — CHLORHEXIDINE GLUCONATE 1 APPLICATION(S): 213 SOLUTION TOPICAL at 11:42

## 2019-05-31 RX ADMIN — INSULIN GLARGINE 5 UNIT(S): 100 INJECTION, SOLUTION SUBCUTANEOUS at 07:45

## 2019-05-31 RX ADMIN — Medication 5 MILLIGRAM(S): at 11:44

## 2019-05-31 RX ADMIN — Medication 25 MILLIGRAM(S): at 18:58

## 2019-05-31 RX ADMIN — Medication 4: at 10:19

## 2019-05-31 RX ADMIN — Medication 4: at 06:30

## 2019-05-31 RX ADMIN — HEPARIN SODIUM 5000 UNIT(S): 5000 INJECTION INTRAVENOUS; SUBCUTANEOUS at 06:30

## 2019-05-31 RX ADMIN — Medication 3 MILLILITER(S): at 20:51

## 2019-05-31 RX ADMIN — SIMVASTATIN 20 MILLIGRAM(S): 20 TABLET, FILM COATED ORAL at 21:47

## 2019-05-31 NOTE — PROGRESS NOTE ADULT - SUBJECTIVE AND OBJECTIVE BOX
INTERVAL HPI/OVERNIGHT EVENTS/SUBJECTIVE: Pt CO inability to close jaw as well as "Soreness" to BL Anterior mandible.   Family at bedside state this is not baseline. Also CO mild posterior neck pain. Gap closed overnight after addition of Lantus.  Taken off of D5.  Able to stop drip.  Denies HA, NV, CP , SOB , Abd pain or other CO.  Negative Head CT, CTA brain and Neck.    ICU Vital Signs Last 24 Hrs  T(C): 37.4 (31 May 2019 04:00), Max: 37.7 (31 May 2019 01:15)  T(F): 99.3 (31 May 2019 04:00), Max: 99.9 (31 May 2019 01:15)  HR: 97 (31 May 2019 04:00) (79 - 97)  BP: 162/77 (31 May 2019 04:00) (135/78 - 171/88)  BP(mean): 111 (31 May 2019 04:00) (98 - 121)  ABP: --  ABP(mean): --  RR: 27 (31 May 2019 04:00) (0 - 27)  SpO2: 97% (31 May 2019 04:00) (94% - 99%)      I&O's Detail    29 May 2019 07:01  -  30 May 2019 07:00  --------------------------------------------------------  IN:    insulin regular Infusion: 11 mL    insulin regular Infusion: 5 mL    multiple electrolytes Injection Type 1multiple electrolytes Injection Type 1: 450 mL    sodium chloride 0.9%: 450 mL  Total IN: 916 mL    OUT:    Voided: 210 mL  Total OUT: 210 mL    Total NET: 706 mL      30 May 2019 07:01  -  31 May 2019 05:25  --------------------------------------------------------  IN:    dextrose 5% + lactated ringers.: 875 mL    insulin regular Infusion: 22 mL    insulin regular Infusion: 8 mL    insulin regular Infusion: 8 mL    insulin regular Infusion: 8 mL    lactated ringers.: 875 mL    multiple electrolytes Injection Type 1multiple electrolytes Injection Type 1: 750 mL    Sodium Chloride 0.9% IV Bolus: 2000 mL    Solution: 900 mL    Solution: 200 mL    Solution: 666.4 mL  Total IN: 6312.4 mL    OUT:    Voided: 2045 mL  Total OUT: 2045 mL    Total NET: 4267.4 mL                MEDICATIONS  (STANDING):  chlorhexidine 2% Cloths 1 Application(s) Topical daily  heparin  Injectable 5000 Unit(s) SubCutaneous every 8 hours  insulin glargine Injectable (LANTUS) 10 Unit(s) SubCutaneous at bedtime  insulin regular Infusion 2 Unit(s)/Hr (2 mL/Hr) IV Continuous <Continuous>  metoprolol tartrate Injectable 5 milliGRAM(s) IV Push every 6 hours  tiotropium 18 MICROgram(s) Capsule 1 Capsule(s) Inhalation daily    MEDICATIONS  (PRN):  ALBUTerol/ipratropium for Nebulization 3 milliLiter(s) Nebulizer every 6 hours PRN Shortness of Breath and/or Wheezing      NUTRITION/IVF: NPO/ P-lyte @ 150      PHYSICAL EXAM:     Gen: NAD, Well appearing, Phili Collar in place.  Jaw wide open. No cyanosis, Pallor.    Eyes: PERRL ~ 3mm, EOMI,     Neurological: A&Ox3, GCS 15, No focal deficit. Able to write words.     ENMT: Clear canals, clear throat.  TMJ appears NL by Palpation BL.  No step off or crepitus.  I am able to passively close jaw ~ 80%.    Neck: Supple. NT AT. Collar in place.  No JVD. ROM Deferred.     Pulmonary: NAD, CTA, = BL .      Cardiovascular: RRR, S1, S2, No Murmurs, rubs or gallops noted.    Gastrointestinal :ND, Soft, NT.    Extremities: NT, AT, no edema, erythema or palpable cord noted.  FROM, = 2+ pulses throughout.    LABS:  CBC Full  -  ( 31 May 2019 03:43 )  WBC Count : 10.8 K/uL  RBC Count : 3.21 M/uL  Hemoglobin : 7.1 g/dL  Hematocrit : 23.3 %  Platelet Count - Automated : x  Mean Cell Volume : 72.6 fl  Mean Cell Hemoglobin : 22.1 pg  Mean Cell Hemoglobin Concentration : 30.5 g/dL  Auto Neutrophil # : 8.2 K/uL  Auto Lymphocyte # : 1.5 K/uL  Auto Monocyte # : 1.1 K/uL  Auto Eosinophil # : 0.0 K/uL  Auto Basophil # : x  Auto Neutrophil % : 75.4 %  Auto Lymphocyte % : 14.2 %  Auto Monocyte % : 9.8 %  Auto Eosinophil % : 0.0 %  Auto Basophil % : x        148<H>  |  112<H>  |  12.0  ----------------------------<  181<H>  4.1   |  20.0<L>  |  0.57    Ca    9.1      31 May 2019 03:43  Phos  3.3       Mg     2.3         TPro  8.4  /  Alb  2.5<L>  /  TBili  0.3<L>  /  DBili  x   /  AST  15  /  ALT  9   /  AlkPhos  123<H>      PT/INR - ( 30 May 2019 00:30 )   PT: 14.7 sec;   INR: 1.27 ratio         PTT - ( 29 May 2019 19:01 )  PTT:32.5 sec  Urinalysis Basic - ( 30 May 2019 03:18 )    Color: Yellow / Appearance: Clear / S.015 / pH: x  Gluc: x / Ketone: Moderate  / Bili: Negative / Urobili: 1 mg/dL   Blood: x / Protein: 100 mg/dL / Nitrite: Negative   Leuk Esterase: Negative / RBC: 0-2 /HPF / WBC Negative   Sq Epi: x / Non Sq Epi: Few / Bacteria: x      RECENT CULTURES:      LIVER FUNCTIONS - ( 30 May 2019 00:30 )  Alb: 2.5 g/dL / Pro: 8.4 g/dL / ALK PHOS: 123 U/L / ALT: 9 U/L / AST: 15 U/L / GGT: x           CARDIAC MARKERS ( 31 May 2019 03:43 )  x     / 0.02 ng/mL / x     / x     / x      CARDIAC MARKERS ( 30 May 2019 21:40 )  x     / 0.02 ng/mL / x     / x     / x      CARDIAC MARKERS ( 29 May 2019 22:27 )  x     / 0.03 ng/mL / 79 U/L / x     / x          CAPILLARY BLOOD GLUCOSE      RADIOLOGY & ADDITIONAL STUDIES:    ASSESSMENT/PLAN:  72yMale presenting with: C6 R TP Frx    Neurological: Keep C-Collar.  Attempt non-sedation IV analgesics as needed.  FU with spine about further recs.  Q 1 Neuro checks     ENMT: I some suspicion of mandibular dislocation.  I can not appropriately R/O or rule in based on existing imaging or to RO fracture. I will discuss with radiology today.  If not well visualized then i will get Max face CT.  If Ruled out then would start low dose muscle relaxants.    Pulmonary: Urge IS.  Home Duonebs and Spiriva as home meds.    Cardiovascular: IV Lopressor until can take PO.  Will Trend Troponin.  Order TTE for syncope MONROE.    Gastrointestinal: NPO until find reason pt can't close jaw and after S/S.    Endo: Will restart Insulin drip for hypoerglycemia.  Will Give Additional Lantus now in order to get off drip, keep gap closed and maintain Euglycemia.    ID: Guillermo    Dispo: Stay in SICU for Neuro checks.    CARE TIME SPENT: 39 minutes.

## 2019-05-31 NOTE — PROGRESS NOTE ADULT - SUBJECTIVE AND OBJECTIVE BOX
VITAL SIGNS.    Vital Signs Last 24 Hrs  T(C): 37 (31 May 2019 08:00), Max: 37.7 (31 May 2019 01:15)  T(F): 98.6 (31 May 2019 08:00), Max: 99.9 (31 May 2019 01:15)  HR: 73 (31 May 2019 13:00) (72 - 97)  BP: 148/77 (31 May 2019 13:00) (135/78 - 171/88)  BP(mean): 105 (31 May 2019 13:00) (101 - 121)  RR: 17 (31 May 2019 13:00) (0 - 27)  SpO2: 97% (31 May 2019 13:00) (94% - 99%)        =================================================    LABS.                          7.4    10.0  )-----------( 208      ( 31 May 2019 12:41 )             24.8     05-31    146<H>  |  111<H>  |  12.0  ----------------------------<  214<H>  4.0   |  23.0  |  0.51    Ca    9.5      31 May 2019 12:41  Phos  3.3     05-31  Mg     2.3     05-31    TPro  8.4  /  Alb  2.5<L>  /  TBili  0.3<L>  /  DBili  x   /  AST  15  /  ALT  9   /  AlkPhos  123<H>  05-30    LIVER FUNCTIONS - ( 30 May 2019 00:30 )  Alb: 2.5 g/dL / Pro: 8.4 g/dL / ALK PHOS: 123 U/L / ALT: 9 U/L / AST: 15 U/L / GGT: x           PT/INR - ( 30 May 2019 00:30 )   PT: 14.7 sec;   INR: 1.27 ratio         PTT - ( 29 May 2019 19:01 )  PTT:32.5 sec  I&O's Summary    30 May 2019 07:01  -  31 May 2019 07:00  --------------------------------------------------------  IN: 6687.4 mL / OUT: 2420 mL / NET: 4267.4 mL    31 May 2019 07:01  -  31 May 2019 14:37  --------------------------------------------------------  IN: 500 mL / OUT: 525 mL / NET: -25 mL          ================================================    IMAGING.    < from: CT Angio Head w/ IV Cont (05.30.19 @ 17:46) >  Bilateral anterior mandibular dislocations. Findings discussed with Dr. Bunn.      *** END OF ADDENDUM 05/31/2019  ***      PROCEDURE DATE:  05/30/2019          INTERPRETATION:  CLINICAL INDICATIONS:r/o BCVI    TECHNIQUE: CTA brain and neck. 93 cc Omnipaque 320 Intravenous contrast   was administered. 2-D MIP and 3-D volume rendering images.    COMPARISON: CT head dated 5/29/2019    FINDINGS:    NONCONTRAST CT HEAD:  There is periventricular and subcortical white matter hypodensity without   mass effect, nonspecific, likely representing mild chronic microvascular   ischemic changes. There is nocompelling evidence for an acute   transcortical infarction. There is no evidence of mass, mass effect,   midline shift or extra-axial fluid collection. The lateral ventricles and   cortical sulci are age-appropriate in size and configuration. Chronic   left orbit medial wall fracture deformity. Chronic bilateral nasal bone   fracture deformities. Polypoid mucosal thickening in the bilateral   maxillary sinuses. Chronic left zygomatic arch fracture deformity. The   orbits, mastoid air cells and visualized paranasal sinuses are otherwise   unremarkable. The calvarium is intact. MR is a more sensitive imaging   modality for the evaluation of an acute infarction.       CTA BRAIN:  The [place] of [place] and vertebrobasilar system are unremarkable without   evidence of stenosis, occlusion or saccular aneurysm dilation. No   evidence for arterial venous malformation. The vertebral arteries are   codominant.      CTA NECK: Mild right carotid bulb calcific plaque. No significant   stenosis.  A left-sided aortic arch is demonstrated. There is normal relationship to   the great vessels. The common carotid arteries, internal carotid arteries   and vertebral arteries shows no evidence of significant stenosis,   occlusion or saccular aneurysm dilation. The vertebral arteries are   codominant.    6.7 x 6.5 cm irregular mass is partially visualized. Bilateral upper lobe   pulmonary metastatic disease. Mediastinal adenopathy.    IMPRESSION:      No large vessel occlusion. Additional findings above.      ***Please see the addendum at the top of this report. It may contain   additional important information or changes.****        < end of copied text >    ================================================    HOME MEDS.    Home Medications:      ================================================    HOSPITAL MEDS.    MEDICATIONS  (STANDING):  chlorhexidine 2% Cloths 1 Application(s) Topical daily  dextrose 5%. 1000 milliLiter(s) (50 mL/Hr) IV Continuous <Continuous>  dextrose 50% Injectable 12.5 Gram(s) IV Push once  dextrose 50% Injectable 25 Gram(s) IV Push once  dextrose 50% Injectable 25 Gram(s) IV Push once  heparin  Injectable 5000 Unit(s) SubCutaneous every 8 hours  insulin glargine Injectable (LANTUS) 17 Unit(s) SubCutaneous at bedtime  insulin lispro (HumaLOG) corrective regimen sliding scale   SubCutaneous every 4 hours  lactated ringers. 1000 milliLiter(s) (125 mL/Hr) IV Continuous <Continuous>  metoprolol tartrate Injectable 5 milliGRAM(s) IV Push every 6 hours  tiotropium 18 MICROgram(s) Capsule 1 Capsule(s) Inhalation daily    MEDICATIONS  (PRN):  ALBUTerol/ipratropium for Nebulization 3 milliLiter(s) Nebulizer every 6 hours PRN Shortness of Breath and/or Wheezing  dextrose 40% Gel 15 Gram(s) Oral once PRN Blood Glucose LESS THAN 70 milliGRAM(s)/deciliter  glucagon  Injectable 1 milliGRAM(s) IntraMuscular once PRN Glucose LESS THAN 70 milligrams/deciliter ***CICU DOWNGRADE ACCEPTANCE NOTE***    CC: dka    Patient seen and examined at the bedside. No acute overnight events. admitted yesterday. Complaining of nothing today. Denies fever/chills, headache, lightheadedness, dizziness, chest pain, palpitations, shortness of breath, cough, abd pain, nausea/vomiting/diarrhea, muscle pain.      =========================================================================================  T(C): 37.3 (19 @ 17:13), Max: 37.7 (19 @ 01:15)  HR: 81 (19 @ 16:44) (72 - 97)  BP: 126/85 (19 @ 16:00) (126/85 - 171/88)  RR: 9 (19 @ 16:44) (9 - 27)  SpO2: 96% (19 @ 16:44) (94% - 99%)    PHYSICAL EXAM.    GEN - appears age appropriate. well nourished. pleasant. no distress.   HEENT - NCAT, EOMI, JOE. in ccollar  RESP - diffuse mild wheeze with mildly decreased airway motion throughout. no rhonchi/crackles. not on supplemental O2. able to speak in full sentences without distress.   CARDIO - NS1S2, RRR. No murmurs/rubs/gallops.  ABD - Soft/Non tender/Non distended. Normal BS x4 quadrants. no guarding/rebound tenderness.  Ext - No STEFF.  MSK - BL 5/5 strength on upper and lower extremities.   Neuro - AAOx3. cn 2-12 grossly intact  Psych - normal affect  Skin - c/d/i. no rashes/lesions      I&O's Summary    30 May 2019 07:01  -  31 May 2019 07:00  --------------------------------------------------------  IN: 6687.4 mL / OUT: 2420 mL / NET: 4267.4 mL    31 May 2019 07:01  -  31 May 2019 17:28  --------------------------------------------------------  IN: 1575 mL / OUT: 1025 mL / NET: 550 mL      Daily Height in cm: 190.5 (30 May 2019 19:00)    Daily Weight in k (31 May 2019 08:22)    =========================================================================================  LABS.        146<H>  |  111<H>  |  12.0  ----------------------------<  214<H>  4.0   |  23.0  |  0.51    Ca    9.5      31 May 2019 12:41  Phos  3.3       Mg     2.3         TPro  8.4  /  Alb  2.5<L>  /  TBili  0.3<L>  /  DBili  x   /  AST  15  /  ALT  9   /  AlkPhos  123<H>                            7.4    10.0  )-----------( 208      ( 31 May 2019 12:41 )             24.8     LIVER FUNCTIONS - ( 30 May 2019 00:30 )  Alb: 2.5 g/dL / Pro: 8.4 g/dL / ALK PHOS: 123 U/L / ALT: 9 U/L / AST: 15 U/L / GGT: x           PT/INR - ( 30 May 2019 00:30 )   PT: 14.7 sec;   INR: 1.27 ratio         PTT - ( 29 May 2019 19:01 )  PTT:32.5 sec  CARDIAC MARKERS ( 31 May 2019 03:43 )  x     / 0.02 ng/mL / x     / x     / x      CARDIAC MARKERS ( 30 May 2019 21:40 )  x     / 0.02 ng/mL / x     / x     / x      CARDIAC MARKERS ( 29 May 2019 22:27 )  x     / 0.03 ng/mL / 79 U/L / x     / x          Urinalysis Basic - ( 30 May 2019 03:18 )    Color: Yellow / Appearance: Clear / S.015 / pH: x  Gluc: x / Ketone: Moderate  / Bili: Negative / Urobili: 1 mg/dL   Blood: x / Protein: 100 mg/dL / Nitrite: Negative   Leuk Esterase: Negative / RBC: 0-2 /HPF / WBC Negative   Sq Epi: x / Non Sq Epi: Few / Bacteria: x          =========================================================================================  IMAGING.     < from: CT Angio Head w/ IV Cont (19 @ 17:46) >  Bilateral anterior mandibular dislocations. Findings discussed with Dr. Bunn.      *** END OF ADDENDUM 2019  ***      PROCEDURE DATE:  2019          INTERPRETATION:  CLINICAL INDICATIONS:r/o BCVI    TECHNIQUE: CTA brain and neck. 93 cc Omnipaque 320 Intravenous contrast   was administered. 2-D MIP and 3-D volume rendering images.    COMPARISON: CT head dated 2019    FINDINGS:    NONCONTRAST CT HEAD:  There is periventricular and subcortical white matter hypodensity without   mass effect, nonspecific, likely representing mild chronic microvascular   ischemic changes. There is nocompelling evidence for an acute   transcortical infarction. There is no evidence of mass, mass effect,   midline shift or extra-axial fluid collection. The lateral ventricles and   cortical sulci are age-appropriate in size and configuration. Chronic   left orbit medial wall fracture deformity. Chronic bilateral nasal bone   fracture deformities. Polypoid mucosal thickening in the bilateral   maxillary sinuses. Chronic left zygomatic arch fracture deformity. The   orbits, mastoid air cells and visualized paranasal sinuses are otherwise   unremarkable. The calvarium is intact. MR is a more sensitive imaging   modality for the evaluation of an acute infarction.       CTA BRAIN:  The [place] of [place] and vertebrobasilar system are unremarkable without   evidence of stenosis, occlusion or saccular aneurysm dilation. No   evidence for arterial venous malformation. The vertebral arteries are   codominant.      CTA NECK: Mild right carotid bulb calcific plaque. No significant   stenosis.  A left-sided aortic arch is demonstrated. There is normal relationship to   the great vessels. The common carotid arteries, internal carotid arteries   and vertebral arteries shows no evidence of significant stenosis,   occlusion or saccular aneurysm dilation. The vertebral arteries are   codominant.    6.7 x 6.5 cm irregular mass is partially visualized. Bilateral upper lobe   pulmonary metastatic disease. Mediastinal adenopathy.    IMPRESSION:      No large vessel occlusion. Additional findings above.      ***Please see the addendum at the top of this report. It may contain   additional important information or changes.****      < end of copied text >    =========================================================================================    HOME MEDS.    Home Medications:      =========================================================================================    HOSPITAL MEDS.    MEDICATIONS  (STANDING):  chlorhexidine 2% Cloths 1 Application(s) Topical daily  heparin  Injectable 5000 Unit(s) SubCutaneous every 8 hours  insulin glargine Injectable (LANTUS) 22 Unit(s) SubCutaneous at bedtime  lactated ringers. 1000 milliLiter(s) (125 mL/Hr) IV Continuous <Continuous>  metoprolol tartrate Injectable 5 milliGRAM(s) IV Push every 6 hours  tiotropium 18 MICROgram(s) Capsule 1 Capsule(s) Inhalation daily    MEDICATIONS  (PRN):  ALBUTerol/ipratropium for Nebulization 3 milliLiter(s) Nebulizer every 6 hours PRN Shortness of Breath and/or Wheezing

## 2019-05-31 NOTE — DIETITIAN INITIAL EVALUATION ADULT. - PERTINENT LABORATORY DATA
05-31 Na148 mmol/L<H> Glu 181 mg/dL<H> K+ 4.1 mmol/L Cr  0.57 mg/dL BUN 12.0 mg/dL Phos 3.3 mg/dL Alb n/a   PAB n/a

## 2019-05-31 NOTE — CHART NOTE - NSCHARTNOTEFT_GEN_A_CORE
Pt no longer has critical care needs requiring SICU care. He is stable to be transferred to the floor and has been accepted to the care of Dr. Mallory. She has received sign out on the patient. All questions were answered. Endocrinology (Dr. Klein[facility]) consult has been called to manage diabetes. Awaiting return call from ortho spine regarding weight bearing status.

## 2019-05-31 NOTE — PROGRESS NOTE ADULT - ASSESSMENT
72yoM w/ pmh dm presenting sp unwitnessed fall down 12 stairs. presentation complicated by dka. downgraded from cicu to med floors 5/31/19.     #fall.   unwitnessed down 12 stairs  cva ruled out, cta H+N w/o clinically significant stenosis, however noted several chronic healed fx inc LT orbit medial wall, chronic BL nasal bone fx, LT zygomatic arch fx, poss sec to hx of recurrent falls?  unclear if mechanical or syncope  favoring poss cardiac etiology given atrial tach on admission ekg  trop neg x3  tele  check orthostatics  echo performed, pending report  PT eval pending clearance from spine sx    #Extensive Lung Ca.   sp partial tx course w/ chemo via RT chest wall port, pt lost to follow up  CT Chest w/ mediastinal lymphadenopathy + multiple BL massess + nodules w/ partial obstruction of bronchus intermedius + areas of tumor encasing lobar + segmental branches of pulm arteries, occ areas of cavitation noted  3.6 x 3.7cm liver mass noted, as well as multiple areas consistent w/ bone mets, pedro luis to the BL ribs, suspecting stage 4 disease  palliative cs appreciated    #Colon CA. ? dx  CT w/ suspicion for neoplasm of ascending colon, also w/ abd lymphadenopathy in addition to bone + liver mets as above    #dka. resolved  sp insulin drip  unclear if type 1 or 2 dm, unknown insulin use status  uncontrolled, a1c 12.1, goal < 8  iss + fs q4h  lantus 17  endo cs pending  will need continued insulin on dc  start mod intensity statin when taking po, lipid panel in am    #RT C6 transverse process fx  ? if truly present per ortho  ortho eval appreciated  ccollar, clearance to remove per ortho + spine  MR CSpine to be ordered by ortho to confirm    #BL mandibular dislocation. resolved  suspect sec to fall  sp reduction  nutri eval appreciated  vitamin c, mvi  pending dysphagia screening, npo until performed    #hepatitis C  reactive on routine screening  hep c rna + genotype in am    #? copd. stable  suspected dx  pt w/ nebs prescribed outpt  cont duonebs prn, spiriva    #htn. uncontrolled, sbp into low 170s  no home med list available  on iv lopressor, change to po when tolerating  also start amlodipine when taking po  goal bp < 140/90, most ideally < 130/80    #anemia.  no baseline labs  no acute s/s or acute/active bleed  likely related to malignancy  type + screen in am  transfuse for hgb < 7 or symptom onset    #hypernatremia. improving  serial bmp    #dvt ppx. scd, sqh    #dispo. PT eval pending order when cleared. pt unable to return to prior home, [facility] not accepting, will need placement, undetermined date of dc    #outpt fu. PMD (unknown), endo 72yoM w/ pmh dm2 on insulin, htn, hld, lung cancer on chemo presenting sp unwitnessed fall down 12 stairs. noted to have presentation complicated by dka. downgraded from cicu to med floors 5/31/19.     #fall.   unwitnessed down 12 stairs  cva ruled out, cta H+N w/o clinically significant stenosis, however noted several chronic healed fx inc LT orbit medial wall, chronic BL nasal bone fx, LT zygomatic arch fx, poss sec to prior falls?  unclear if mechanical or syncope  favoring poss cardiac etiology given atrial tach on admission ekg  trop neg x3  tele  check orthostatics  echo performed, pending report  PT eval pending    #Extensive Lung Ca.   sp partial tx course w/ chemo via RT chest wall port, last session 2mo ago, pt lost to follow up  CT Chest w/ mediastinal lymphadenopathy + multiple BL masses + nodules w/ partial obstruction of bronchus intermedius + areas of tumor encasing lobar + segmental branches of pulm arteries, occ areas of cavitation noted  3.6 x 3.7cm liver mass also noted, as well as multiple areas consistent w/ bone mets, pedro luis to the BL ribs, consistent w/ stage 4 disease  palliative cs appreciated  follows w/ Dr Madrid[facility], cs attempted to be placed but office closed, will need to be called in am    #Colon CA. ? dx  CT w/ suspicion for neoplasm of ascending colon, also w/ abd lymphadenopathy in addition to bone + liver mets as above  onc eval as above    #dka. resolved  in setting of uncontrolled chronic type 2dm on long term insulin therapy  sp insulin drip  uncontrolled, a1c 12.1, goal < 8  iss + fs achs  lantus 22  endo cs appreciated, start lispro 5u tid  will need continued insulin on dc  dm educator eval pending  nutri eval appreciated  vitamin c, mvi  pending dysphagia screening, npo until performed    #hld.   on simvastatin 20mg @ home  resume, inc to high intensity, if ascvd risk high/very high  lipid panel in am    #RT C6 transverse process fx  post traumatic sec to fall  ortho eval appreciated  ccollar, clearance to remove per ortho + spine pending  MR CSpine to be ordered by ortho to confirm    #BL mandibular dislocation. resolved  suspect sec to fall  sp reduction  liq diet for now, pending slp eval    #hepatitis C  unclear if pt aware of dx  reactive on routine screening  hep c rna + genotype in am    #copd not o2 dependent. w/ mild exac  hold spiriva while nebs made standing, resume on dc  add prn nebs    #htn. uncontrolled, sbp into low 170s  change iv lopressor to po, titrate to home dose 100mg bid as tolerated  resume losartan low dose, titrate to home dose 100mg as tolerated  also on norvasc 10mg, resume if needed after titrating other meds back to home doses  goal bp < 140/90, most ideally < 130/80    #vitamin d def.   on ergocalciferol as outpt  start vit d 1000u daily  level in am, if low resume high dose    #anemia.  no baseline labs  no acute s/s or acute/active bleed  likely related to malignancy  type + screen in am  transfuse for hgb < 7 or symptom onset    #hypernatremia. improving  serial bmp    #dvt ppx. scd, sqh    #dispo. PT eval pending. per SW pt unable to return to prior home, [facility] not accepting, will need placement, undetermined date of dc    #outpt fu. PMD (unknown), endo

## 2019-05-31 NOTE — DIETITIAN INITIAL EVALUATION ADULT. - OTHER INFO
72 year old male s/p fall down stairs found to have C6 transverse process fracture found to be in DKA. Pt remains NPO with possible mandibular fx. Collar in place. Pt unarousable during visit, unable to obtain hx

## 2019-05-31 NOTE — CONSULT NOTE ADULT - SUBJECTIVE AND OBJECTIVE BOX
Patient is a 69y old  Male who presents with a chief complaint of fall (31 May 2019 14:36)    HPI:  72M presents after fall down 12 stairs. Unknown if he takes blood thinners. Unknown LOC. Found at bottom of stairs by friends. Upon arrival, EMS states his GCS 13 (inappropriate speech). Unsure of mechanism how patient fell.         PAST MEDICAL & SURGICAL HISTORY:  No pertinent past medical history  No significant past surgical history      SH: lives at home with family. no EtOH/smoking    FAMILY HISTORY:  No pertinent family history in first degree relatives        Allergies    Allergy Status Unknown    Intolerances        REVIEW OF SYSTEMS:    CONSTITUTIONAL: No fever, weight loss, or fatigue  EYES: No eye pain, visual disturbances, or discharge  ENMT:  No difficulty hearing, tinnitus, vertigo; No sinus or throat pain  NECK: No pain or stiffness  RESPIRATORY: No cough, wheezing, chills or hemoptysis; No shortness of breath  CARDIOVASCULAR: No chest pain, palpitations, dizziness, or leg swelling  GASTROINTESTINAL: No abdominal or epigastric pain. No nausea, vomiting, or hematemesis; No diarrhea or constipation. No melena or hematochezia.  NEUROLOGICAL: No headaches, memory loss, loss of strength, numbness, or tremors  SKIN: No itching, burning, rashes, or lesions   MUSCULOSKELETAL: No joint pain or swelling; No muscle, back, or extremity pain  PSYCHIATRIC: No depression, anxiety, mood swings, or difficulty sleeping        MEDICATIONS  (STANDING):  chlorhexidine 2% Cloths 1 Application(s) Topical daily  dextrose 5%. 1000 milliLiter(s) (50 mL/Hr) IV Continuous <Continuous>  dextrose 50% Injectable 12.5 Gram(s) IV Push once  dextrose 50% Injectable 25 Gram(s) IV Push once  dextrose 50% Injectable 25 Gram(s) IV Push once  heparin  Injectable 5000 Unit(s) SubCutaneous every 8 hours  insulin glargine Injectable (LANTUS) 17 Unit(s) SubCutaneous at bedtime  insulin lispro (HumaLOG) corrective regimen sliding scale   SubCutaneous every 4 hours  lactated ringers. 1000 milliLiter(s) (125 mL/Hr) IV Continuous <Continuous>  metoprolol tartrate Injectable 5 milliGRAM(s) IV Push every 6 hours  tiotropium 18 MICROgram(s) Capsule 1 Capsule(s) Inhalation daily    MEDICATIONS  (PRN):  ALBUTerol/ipratropium for Nebulization 3 milliLiter(s) Nebulizer every 6 hours PRN Shortness of Breath and/or Wheezing  dextrose 40% Gel 15 Gram(s) Oral once PRN Blood Glucose LESS THAN 70 milliGRAM(s)/deciliter  glucagon  Injectable 1 milliGRAM(s) IntraMuscular once PRN Glucose LESS THAN 70 milligrams/deciliter      Vital Signs Last 24 Hrs  T(C): 37 (31 May 2019 08:00), Max: 37.7 (31 May 2019 01:15)  T(F): 98.6 (31 May 2019 08:00), Max: 99.9 (31 May 2019 01:15)  HR: 72 (31 May 2019 14:00) (72 - 97)  BP: 166/79 (31 May 2019 14:00) (137/76 - 171/88)  BP(mean): 114 (31 May 2019 14:00) (101 - 121)  RR: 18 (31 May 2019 14:00) (0 - 27)  SpO2: 96% (31 May 2019 14:00) (94% - 99%)      PHYSICAL EXAM:    Constitutional: NAD, well-groomed, well-developed  HEENT: EOMI, no exophalmos  Neck: trachea midline, no thyroid enlargement  Respiratory: CTAB  Cardiovascular: S1 and S2, RRR  Gastrointestinal: BS+, soft, ntnd  Extremities: No peripheral edema  Neurological: A/O x 3, no focal deficits  Psychiatric: Normal mood, normal affect  Skin: No rashes, no acanthosis        LABS  05-31    146<H>  |  111<H>  |  12.0  ----------------------------<  214<H>  4.0   |  23.0  |  0.51    Ca    9.5      31 May 2019 12:41  Phos  3.3     05-31  Mg     2.3     05-31    TPro  8.4  /  Alb  2.5<L>  /  TBili  0.3<L>  /  DBili  x   /  AST  15  /  ALT  9   /  AlkPhos  123<H>  05-30                          7.4    10.0  )-----------( 208      ( 31 May 2019 12:41 )             24.8           Hemoglobin A1C, Whole Blood: 12.1 % (05-31 @ 12:41)    Ketone - Urine: Moderate (05-30 @ 03:18)    Alanine Aminotransferase (ALT/SGPT): 9 U/L (05-30-19 @ 00:30)  Albumin, Serum: 2.5 g/dL (05-30-19 @ 00:30)  Alkaline Phosphatase, Serum: 123 U/L (05-30-19 @ 00:30)  Aspartate Aminotransferase (AST/SGOT): 15 U/L (05-30-19 @ 00:30)  Aspartate Aminotransferase (AST/SGOT): 15 U/L (05-29-19 @ 19:01)  Alkaline Phosphatase, Serum: 138 U/L (05-29-19 @ 19:01)  Alanine Aminotransferase (ALT/SGPT): 10 U/L (05-29-19 @ 19:01)  Albumin, Serum: 2.7 g/dL (05-29-19 @ 19:01)      CAPILLARY BLOOD GLUCOSE      POCT Blood Glucose.: 217 mg/dL (31 May 2019 14:05)  POCT Blood Glucose.: 207 mg/dL (31 May 2019 10:16)  POCT Blood Glucose.: 207 mg/dL (31 May 2019 07:39)  POCT Blood Glucose.: 248 mg/dL (31 May 2019 06:29)  POCT Blood Glucose.: 138 mg/dL (31 May 2019 01:10)  POCT Blood Glucose.: 111 mg/dL (31 May 2019 00:18)  POCT Blood Glucose.: 152 mg/dL (30 May 2019 23:20)  POCT Blood Glucose.: 174 mg/dL (30 May 2019 21:52)  POCT Blood Glucose.: 183 mg/dL (30 May 2019 20:55)  POCT Blood Glucose.: 195 mg/dL (30 May 2019 19:57)  POCT Blood Glucose.: 167 mg/dL (30 May 2019 18:50)  POCT Blood Glucose.: 154 mg/dL (30 May 2019 17:50)  POCT Blood Glucose.: 134 mg/dL (30 May 2019 16:49) Patient is a 69y old  Male who presents with a chief complaint of fall (31 May 2019 14:36)    HPI:  72M w/ T2DM presents after fall down 12 stairs. Unknown if he takes blood thinners. Unknown LOC. Found at bottom of stairs by friends. Upon arrival, EMS states his GCS 13 (inappropriate speech). Unsure of mechanism how patient fell.   diabetes for 7 years  denies any FH of diabetes  meds: lantus 50 units, short acting insulin (does not remember name) 20/15/20  sees PMD  denies any complications      PAST MEDICAL & SURGICAL HISTORY:  No pertinent past medical history  No significant past surgical history      SH: lives at home with aide. no EtOH/smoking    FAMILY HISTORY:  No pertinent family history in first degree relatives        Allergies    Allergy Status Unknown    Intolerances        REVIEW OF SYSTEMS:    CONSTITUTIONAL: No fever, weight loss, or fatigue  EYES: No eye pain, visual disturbances, or discharge  ENMT:  No difficulty hearing, tinnitus, vertigo; No sinus or throat pain  NECK: No pain or stiffness  RESPIRATORY: No cough, wheezing, chills or hemoptysis; No shortness of breath  CARDIOVASCULAR: No chest pain, palpitations, dizziness, or leg swelling  GASTROINTESTINAL: No abdominal or epigastric pain. No nausea, vomiting, or hematemesis; No diarrhea or constipation. No melena or hematochezia.  NEUROLOGICAL: No headaches, memory loss, loss of strength, numbness, or tremors  SKIN: No itching, burning, rashes, or lesions   MUSCULOSKELETAL: No joint pain or swelling; No muscle, back, or extremity pain  PSYCHIATRIC: No depression, anxiety, mood swings, or difficulty sleeping        MEDICATIONS  (STANDING):  chlorhexidine 2% Cloths 1 Application(s) Topical daily  dextrose 5%. 1000 milliLiter(s) (50 mL/Hr) IV Continuous <Continuous>  dextrose 50% Injectable 12.5 Gram(s) IV Push once  dextrose 50% Injectable 25 Gram(s) IV Push once  dextrose 50% Injectable 25 Gram(s) IV Push once  heparin  Injectable 5000 Unit(s) SubCutaneous every 8 hours  insulin glargine Injectable (LANTUS) 17 Unit(s) SubCutaneous at bedtime  insulin lispro (HumaLOG) corrective regimen sliding scale   SubCutaneous every 4 hours  lactated ringers. 1000 milliLiter(s) (125 mL/Hr) IV Continuous <Continuous>  metoprolol tartrate Injectable 5 milliGRAM(s) IV Push every 6 hours  tiotropium 18 MICROgram(s) Capsule 1 Capsule(s) Inhalation daily    MEDICATIONS  (PRN):  ALBUTerol/ipratropium for Nebulization 3 milliLiter(s) Nebulizer every 6 hours PRN Shortness of Breath and/or Wheezing  dextrose 40% Gel 15 Gram(s) Oral once PRN Blood Glucose LESS THAN 70 milliGRAM(s)/deciliter  glucagon  Injectable 1 milliGRAM(s) IntraMuscular once PRN Glucose LESS THAN 70 milligrams/deciliter      Vital Signs Last 24 Hrs  T(C): 37 (31 May 2019 08:00), Max: 37.7 (31 May 2019 01:15)  T(F): 98.6 (31 May 2019 08:00), Max: 99.9 (31 May 2019 01:15)  HR: 72 (31 May 2019 14:00) (72 - 97)  BP: 166/79 (31 May 2019 14:00) (137/76 - 171/88)  BP(mean): 114 (31 May 2019 14:00) (101 - 121)  RR: 18 (31 May 2019 14:00) (0 - 27)  SpO2: 96% (31 May 2019 14:00) (94% - 99%)      PHYSICAL EXAM:    Constitutional: NAD, elderly  HEENT: EOMI, no exophalmos  Neck: c-collar in place  Respiratory: CTAB  Cardiovascular: S1 and S2, RRR  Gastrointestinal: BS+, soft, ntnd  Extremities: No peripheral edema  Neurological: A/O x 3, no focal deficits  Psychiatric: Normal mood, normal affect  Skin: No rashes, no acanthosis        LABS  05-31    146<H>  |  111<H>  |  12.0  ----------------------------<  214<H>  4.0   |  23.0  |  0.51    Ca    9.5      31 May 2019 12:41  Phos  3.3     05-31  Mg     2.3     05-31    TPro  8.4  /  Alb  2.5<L>  /  TBili  0.3<L>  /  DBili  x   /  AST  15  /  ALT  9   /  AlkPhos  123<H>  05-30                          7.4    10.0  )-----------( 208      ( 31 May 2019 12:41 )             24.8           Hemoglobin A1C, Whole Blood: 12.1 % (05-31 @ 12:41)    Ketone - Urine: Moderate (05-30 @ 03:18)    Alanine Aminotransferase (ALT/SGPT): 9 U/L (05-30-19 @ 00:30)  Albumin, Serum: 2.5 g/dL (05-30-19 @ 00:30)  Alkaline Phosphatase, Serum: 123 U/L (05-30-19 @ 00:30)  Aspartate Aminotransferase (AST/SGOT): 15 U/L (05-30-19 @ 00:30)  Aspartate Aminotransferase (AST/SGOT): 15 U/L (05-29-19 @ 19:01)  Alkaline Phosphatase, Serum: 138 U/L (05-29-19 @ 19:01)  Alanine Aminotransferase (ALT/SGPT): 10 U/L (05-29-19 @ 19:01)  Albumin, Serum: 2.7 g/dL (05-29-19 @ 19:01)      CAPILLARY BLOOD GLUCOSE      POCT Blood Glucose.: 217 mg/dL (31 May 2019 14:05)  POCT Blood Glucose.: 207 mg/dL (31 May 2019 10:16)  POCT Blood Glucose.: 207 mg/dL (31 May 2019 07:39)  POCT Blood Glucose.: 248 mg/dL (31 May 2019 06:29)  POCT Blood Glucose.: 138 mg/dL (31 May 2019 01:10)  POCT Blood Glucose.: 111 mg/dL (31 May 2019 00:18)  POCT Blood Glucose.: 152 mg/dL (30 May 2019 23:20)  POCT Blood Glucose.: 174 mg/dL (30 May 2019 21:52)  POCT Blood Glucose.: 183 mg/dL (30 May 2019 20:55)  POCT Blood Glucose.: 195 mg/dL (30 May 2019 19:57)  POCT Blood Glucose.: 167 mg/dL (30 May 2019 18:50)  POCT Blood Glucose.: 154 mg/dL (30 May 2019 17:50)  POCT Blood Glucose.: 134 mg/dL (30 May 2019 16:49)

## 2019-05-31 NOTE — CONSULT NOTE ADULT - ASSESSMENT
Uncontrolled T2DM- hyperglycemic  -A1c 12.1  -check sugars AC and bedtime  -ensure diabetic diet  -recommend the following     continue lantus 17 units QHS     start lispro 5 units TID     continue with insulin sliding scale  -needs to be seen by CDE for insulin and meter teach  -needs to be seen by nutrition for diet education    Hypernatremia- mild. monitor for now    HLD- check lipids Uncontrolled T2DM- hyperglycemic  -A1c 12.1  -check sugars AC and bedtime  -ensure diabetic diet  -recommend the following     increase lantus to 22 units QHS     npo currently due to possible mandibular fracture but when starting diet can start lispro 5 units TID     continue with insulin sliding scale  -needs to be seen by CDE for insulin and meter teach  -needs to be seen by nutrition for diet education    Hypernatremia- mild. monitor for now    HLD- check lipids 72M w/ T2DM presents after fall down 12 stairs. Unknown if he takes blood thinners. Unknown LOC. Found at bottom of stairs by friends. Upon arrival, EMS states his GCS 13 (inappropriate speech). Unsure of mechanism how patient fell.     Uncontrolled T2DM- hyperglycemic  -A1c 12.1  -check sugars AC and bedtime  -ensure diabetic diet  -recommend the following     increase lantus to 22 units QHS     npo currently due to possible mandibular fracture but when starting diet can start lispro 5 units TID     continue with insulin sliding scale  -needs to be seen by CDE for insulin and meter teach  -needs to be seen by nutrition for diet education  -will need follow up in the clinic with us as mechanical fall could be related to diabetes    Hypernatremia- mild. monitor for now    HLD- check lipids

## 2019-05-31 NOTE — PROGRESS NOTE ADULT - SUBJECTIVE AND OBJECTIVE BOX
[facility]  [id number]          History: 72y Male with C6 transverse process fx. Patient is alert and follows commands but in non-verbal and unable to provide a history.  The patient is in no apparent distress.  Patient nods his head "no" when asked if he has pain, numbness/tingling or weakness down his bilateral upper extremities.  There is some pain to his posterior neck.                            7.1    10.8  )-----------( 245      ( 31 May 2019 03:43 )             23.3     05-31    148<H>  |  112<H>  |  12.0  ----------------------------<  181<H>  4.1   |  20.0<L>  |  0.57    Ca    9.1      31 May 2019 03:43  Phos  3.3     05-31  Mg     2.3     05-31    TPro  8.4  /  Alb  2.5<L>  /  TBili  0.3<L>  /  DBili  x   /  AST  15  /  ALT  9   /  AlkPhos  123<H>  05-30      MEDICATIONS  (STANDING):  chlorhexidine 2% Cloths 1 Application(s) Topical daily  dextrose 5%. 1000 milliLiter(s) (50 mL/Hr) IV Continuous <Continuous>  dextrose 50% Injectable 12.5 Gram(s) IV Push once  dextrose 50% Injectable 25 Gram(s) IV Push once  dextrose 50% Injectable 25 Gram(s) IV Push once  heparin  Injectable 5000 Unit(s) SubCutaneous every 8 hours  insulin glargine Injectable (LANTUS) 5 Unit(s) SubCutaneous every morning  insulin glargine Injectable (LANTUS) 10 Unit(s) SubCutaneous at bedtime  insulin lispro (HumaLOG) corrective regimen sliding scale   SubCutaneous every 4 hours  lactated ringers. 1000 milliLiter(s) (125 mL/Hr) IV Continuous <Continuous>  metoprolol tartrate Injectable 5 milliGRAM(s) IV Push every 6 hours  tiotropium 18 MICROgram(s) Capsule 1 Capsule(s) Inhalation daily    MEDICATIONS  (PRN):  ALBUTerol/ipratropium for Nebulization 3 milliLiter(s) Nebulizer every 6 hours PRN Shortness of Breath and/or Wheezing  dextrose 40% Gel 15 Gram(s) Oral once PRN Blood Glucose LESS THAN 70 milliGRAM(s)/deciliter  glucagon  Injectable 1 milliGRAM(s) IntraMuscular once PRN Glucose LESS THAN 70 milligrams/deciliter      Physical exam:   Appearance:  NAD, alert.  Patient is nonverbal, but can nod head to questions.  Exam slightly limited to patient not being verbal  C-collar is in place and intact  Non tender to palpation through out the paracervical muscles bilaterally  Sensation intact bilaterally UE and LE  2+ radial pulses; capillary refill <2 sec.  No cyanosis  5/5 strength bilateral ankle DF/PF, EHL/FHL  Motor exam:                           ShShrug       Biceps      Triceps     WF     WE      strength                   Right:      5/5             5/5           5/5         5/5     5/5       4/5              Left:        5/5             5/5           5/5         5/5     5/5       4/5        Primary Orthopedic Assessment:  • C6 right transverse process fracture        Plan:   • Continue care a per primary team  • C-collar at all times  • Patient will need MRI of the cervical spine; ICU to place order when patient is stable enough to have test done  • DVT prophylaxis as prescribed, including use of compression devices and ankle pumps  • Pain control as clinically indicated

## 2019-06-01 ENCOUNTER — OUTPATIENT (OUTPATIENT)
Dept: OUTPATIENT SERVICES | Facility: HOSPITAL | Age: 69
LOS: 1 days | End: 2019-06-01
Payer: MEDICARE

## 2019-06-01 DIAGNOSIS — Z95.828 PRESENCE OF OTHER VASCULAR IMPLANTS AND GRAFTS: Chronic | ICD-10-CM

## 2019-06-01 LAB
ANION GAP SERPL CALC-SCNC: 12 MMOL/L — SIGNIFICANT CHANGE UP (ref 5–17)
BUN SERPL-MCNC: 12 MG/DL — SIGNIFICANT CHANGE UP (ref 8–20)
CALCIUM SERPL-MCNC: 9.6 MG/DL — SIGNIFICANT CHANGE UP (ref 8.6–10.2)
CHLORIDE SERPL-SCNC: 110 MMOL/L — HIGH (ref 98–107)
CHOLEST SERPL-MCNC: 90 MG/DL — LOW (ref 110–199)
CO2 SERPL-SCNC: 22 MMOL/L — SIGNIFICANT CHANGE UP (ref 22–29)
CREAT SERPL-MCNC: 0.49 MG/DL — LOW (ref 0.5–1.3)
GLUCOSE BLDC GLUCOMTR-MCNC: 226 MG/DL — HIGH (ref 70–99)
GLUCOSE BLDC GLUCOMTR-MCNC: 274 MG/DL — HIGH (ref 70–99)
GLUCOSE BLDC GLUCOMTR-MCNC: 291 MG/DL — HIGH (ref 70–99)
GLUCOSE BLDC GLUCOMTR-MCNC: 302 MG/DL — HIGH (ref 70–99)
GLUCOSE SERPL-MCNC: 233 MG/DL — HIGH (ref 70–115)
HCT VFR BLD CALC: 27.1 % — LOW (ref 42–52)
HDLC SERPL-MCNC: 26 MG/DL — LOW
HGB BLD-MCNC: 8.1 G/DL — LOW (ref 14–18)
LIPID PNL WITH DIRECT LDL SERPL: 44 MG/DL — SIGNIFICANT CHANGE UP
MAGNESIUM SERPL-MCNC: 1.6 MG/DL — SIGNIFICANT CHANGE UP (ref 1.6–2.6)
MCHC RBC-ENTMCNC: 22.2 PG — LOW (ref 27–31)
MCHC RBC-ENTMCNC: 29.9 G/DL — LOW (ref 32–36)
MCV RBC AUTO: 74.2 FL — LOW (ref 80–94)
PHOSPHATE SERPL-MCNC: 3.6 MG/DL — SIGNIFICANT CHANGE UP (ref 2.4–4.7)
PLATELET # BLD AUTO: 183 K/UL — SIGNIFICANT CHANGE UP (ref 150–400)
POTASSIUM SERPL-MCNC: 4.1 MMOL/L — SIGNIFICANT CHANGE UP (ref 3.5–5.3)
POTASSIUM SERPL-SCNC: 4.1 MMOL/L — SIGNIFICANT CHANGE UP (ref 3.5–5.3)
RBC # BLD: 3.65 M/UL — LOW (ref 4.6–6.2)
RBC # FLD: 20 % — HIGH (ref 11–15.6)
SODIUM SERPL-SCNC: 144 MMOL/L — SIGNIFICANT CHANGE UP (ref 135–145)
TOTAL CHOLESTEROL/HDL RATIO MEASUREMENT: 3 RATIO — LOW (ref 3.4–9.6)
TRIGL SERPL-MCNC: 99 MG/DL — SIGNIFICANT CHANGE UP (ref 10–200)
WBC # BLD: 12.7 K/UL — HIGH (ref 4.8–10.8)
WBC # FLD AUTO: 12.7 K/UL — HIGH (ref 4.8–10.8)

## 2019-06-01 PROCEDURE — 99233 SBSQ HOSP IP/OBS HIGH 50: CPT

## 2019-06-01 PROCEDURE — 99232 SBSQ HOSP IP/OBS MODERATE 35: CPT

## 2019-06-01 PROCEDURE — 99231 SBSQ HOSP IP/OBS SF/LOW 25: CPT

## 2019-06-01 RX ORDER — MAGNESIUM SULFATE 500 MG/ML
1 VIAL (ML) INJECTION ONCE
Refills: 0 | Status: COMPLETED | OUTPATIENT
Start: 2019-06-01 | End: 2019-06-01

## 2019-06-01 RX ORDER — METOPROLOL TARTRATE 50 MG
25 TABLET ORAL ONCE
Refills: 0 | Status: COMPLETED | OUTPATIENT
Start: 2019-06-01 | End: 2019-06-01

## 2019-06-01 RX ORDER — INSULIN LISPRO 100/ML
8 VIAL (ML) SUBCUTANEOUS
Refills: 0 | Status: DISCONTINUED | OUTPATIENT
Start: 2019-06-01 | End: 2019-06-01

## 2019-06-01 RX ORDER — INSULIN GLARGINE 100 [IU]/ML
30 INJECTION, SOLUTION SUBCUTANEOUS AT BEDTIME
Refills: 0 | Status: DISCONTINUED | OUTPATIENT
Start: 2019-06-01 | End: 2019-06-01

## 2019-06-01 RX ORDER — INSULIN GLARGINE 100 [IU]/ML
35 INJECTION, SOLUTION SUBCUTANEOUS AT BEDTIME
Refills: 0 | Status: DISCONTINUED | OUTPATIENT
Start: 2019-06-01 | End: 2019-06-01

## 2019-06-01 RX ORDER — INSULIN GLARGINE 100 [IU]/ML
30 INJECTION, SOLUTION SUBCUTANEOUS AT BEDTIME
Refills: 0 | Status: DISCONTINUED | OUTPATIENT
Start: 2019-06-01 | End: 2019-06-02

## 2019-06-01 RX ORDER — INSULIN LISPRO 100/ML
10 VIAL (ML) SUBCUTANEOUS
Refills: 0 | Status: DISCONTINUED | OUTPATIENT
Start: 2019-06-01 | End: 2019-06-02

## 2019-06-01 RX ADMIN — Medication 25 MILLIGRAM(S): at 17:29

## 2019-06-01 RX ADMIN — Medication 25 MILLIGRAM(S): at 01:33

## 2019-06-01 RX ADMIN — Medication 3 MILLILITER(S): at 20:01

## 2019-06-01 RX ADMIN — Medication 2: at 22:50

## 2019-06-01 RX ADMIN — Medication 25 MILLIGRAM(S): at 05:08

## 2019-06-01 RX ADMIN — SODIUM CHLORIDE 125 MILLILITER(S): 9 INJECTION, SOLUTION INTRAVENOUS at 07:55

## 2019-06-01 RX ADMIN — Medication 10 UNIT(S): at 17:29

## 2019-06-01 RX ADMIN — Medication 8: at 12:49

## 2019-06-01 RX ADMIN — Medication 100 GRAM(S): at 17:29

## 2019-06-01 RX ADMIN — Medication 4: at 07:55

## 2019-06-01 RX ADMIN — Medication 3 MILLILITER(S): at 10:10

## 2019-06-01 RX ADMIN — Medication 3 MILLILITER(S): at 15:02

## 2019-06-01 RX ADMIN — HEPARIN SODIUM 5000 UNIT(S): 5000 INJECTION INTRAVENOUS; SUBCUTANEOUS at 22:50

## 2019-06-01 RX ADMIN — HEPARIN SODIUM 5000 UNIT(S): 5000 INJECTION INTRAVENOUS; SUBCUTANEOUS at 14:43

## 2019-06-01 RX ADMIN — SODIUM CHLORIDE 125 MILLILITER(S): 9 INJECTION, SOLUTION INTRAVENOUS at 03:05

## 2019-06-01 RX ADMIN — Medication 5 UNIT(S): at 12:49

## 2019-06-01 RX ADMIN — Medication 5 UNIT(S): at 07:55

## 2019-06-01 RX ADMIN — SIMVASTATIN 20 MILLIGRAM(S): 20 TABLET, FILM COATED ORAL at 22:50

## 2019-06-01 RX ADMIN — INSULIN GLARGINE 30 UNIT(S): 100 INJECTION, SOLUTION SUBCUTANEOUS at 22:50

## 2019-06-01 RX ADMIN — LOSARTAN POTASSIUM 25 MILLIGRAM(S): 100 TABLET, FILM COATED ORAL at 05:08

## 2019-06-01 RX ADMIN — Medication 1000 UNIT(S): at 12:49

## 2019-06-01 RX ADMIN — Medication 6: at 17:29

## 2019-06-01 RX ADMIN — HEPARIN SODIUM 5000 UNIT(S): 5000 INJECTION INTRAVENOUS; SUBCUTANEOUS at 05:08

## 2019-06-01 NOTE — SWALLOW BEDSIDE ASSESSMENT ADULT - SWALLOW EVAL: RECOMMENDED FEEDING/EATING TECHNIQUES
maintain upright posture during/after eating for 30 mins/oral hygiene/small sips/bites/position upright (90 degrees)

## 2019-06-01 NOTE — PROGRESS NOTE ADULT - SUBJECTIVE AND OBJECTIVE BOX
SUBJECTIVE: ARMIDA overnight. Pain is well controlled. Up in a chair without difficulty. Denies nausea/vomiting/SOB/Fevers/chills.       MEDICATIONS  (STANDING):  ALBUTerol/ipratropium for Nebulization 3 milliLiter(s) Nebulizer every 6 hours  chlorhexidine 2% Cloths 1 Application(s) Topical daily  cholecalciferol 1000 Unit(s) Oral daily  dextrose 5%. 1000 milliLiter(s) (50 mL/Hr) IV Continuous <Continuous>  dextrose 50% Injectable 12.5 Gram(s) IV Push once  dextrose 50% Injectable 25 Gram(s) IV Push once  dextrose 50% Injectable 25 Gram(s) IV Push once  heparin  Injectable 5000 Unit(s) SubCutaneous every 8 hours  insulin glargine Injectable (LANTUS) 22 Unit(s) SubCutaneous at bedtime  insulin lispro (HumaLOG) corrective regimen sliding scale   SubCutaneous three times a day before meals  insulin lispro (HumaLOG) corrective regimen sliding scale   SubCutaneous at bedtime  insulin lispro Injectable (HumaLOG) 5 Unit(s) SubCutaneous three times a day before meals  lactated ringers. 1000 milliLiter(s) (125 mL/Hr) IV Continuous <Continuous>  losartan 25 milliGRAM(s) Oral daily  metoprolol tartrate 25 milliGRAM(s) Oral two times a day  simvastatin 20 milliGRAM(s) Oral at bedtime    MEDICATIONS  (PRN):  ALBUTerol    0.083% 2.5 milliGRAM(s) Nebulizer every 4 hours PRN Shortness of Breath and/or Wheezing  dextrose 40% Gel 15 Gram(s) Oral once PRN Blood Glucose LESS THAN 70 milliGRAM(s)/deciliter  glucagon  Injectable 1 milliGRAM(s) IntraMuscular once PRN Glucose LESS THAN 70 milligrams/deciliter      Vital Signs Last 24 Hrs  T(C): 37.1 (01 Jun 2019 08:00), Max: 37.3 (31 May 2019 17:13)  T(F): 98.8 (01 Jun 2019 08:00), Max: 99.2 (31 May 2019 17:13)  HR: 88 (01 Jun 2019 10:12) (66 - 92)  BP: 145/88 (01 Jun 2019 08:00) (126/85 - 178/71)  BP(mean): 103 (31 May 2019 16:00) (103 - 121)  RR: 18 (01 Jun 2019 08:00) (9 - 20)  SpO2: 92% (01 Jun 2019 10:12) (92% - 98%)    PHYSICAL EXAM:   Gen: NAD, Well appearing, Phili Collar in place.  Jaw wide open. No cyanosis, Pallor  Pulmonary: NAD, CTA, = BL .    Cardiovascular: RRR, S1, S2, No Murmurs, rubs or gallops noted.  Gastrointestinal :ND, Soft, NT.  Extremities: NT, AT, no edema, erythema or palpable cord noted.  FROM, = 2+ pulses throughout.      I&O's Detail    31 May 2019 07:01  -  01 Jun 2019 07:00  --------------------------------------------------------  IN:    lactated ringers.: 1125 mL    Oral Fluid: 450 mL  Total IN: 1575 mL    OUT:    Voided: 1325 mL  Total OUT: 1325 mL    Total NET: 250 mL          LABS:                        8.1    12.7  )-----------( 183      ( 01 Jun 2019 07:00 )             27.1     06-01    144  |  110<H>  |  12.0  ----------------------------<  233<H>  4.1   |  22.0  |  0.49<L>    Ca    9.6      01 Jun 2019 06:51  Phos  3.6     06-01  Mg     1.6     06-01            RADIOLOGY & ADDITIONAL STUDIES:

## 2019-06-01 NOTE — SWALLOW BEDSIDE ASSESSMENT ADULT - ORAL PREPARATORY PHASE
slow but functional mastication, likely 2* c-collar likely 2* c-collar, incomplete dentition/Decreased mastication ability Within functional limits

## 2019-06-01 NOTE — PROGRESS NOTE ADULT - SUBJECTIVE AND OBJECTIVE BOX
Pt Name: PATO BUSCH    MRN: [id number]        Patient is a 72 year old Male with C6 transverse process fracture. Patient seen and examined this morning. C-collar in place. Patient is verbal and following commands. He denies numbness or tingling to upper or lower extremities bilaterally. Patient states he is in no pain at time of exam. No other complaints at this time.       PAST MEDICAL & SURGICAL HISTORY:  PAST MEDICAL & SURGICAL HISTORY:  No pertinent past medical history  No significant past surgical history      Allergies: Allergy Status Unknown      Medications: ALBUTerol    0.083% 2.5 milliGRAM(s) Nebulizer every 4 hours PRN  ALBUTerol/ipratropium for Nebulization 3 milliLiter(s) Nebulizer every 6 hours  chlorhexidine 2% Cloths 1 Application(s) Topical daily  cholecalciferol 1000 Unit(s) Oral daily  dextrose 40% Gel 15 Gram(s) Oral once PRN  dextrose 5%. 1000 milliLiter(s) IV Continuous <Continuous>  dextrose 50% Injectable 12.5 Gram(s) IV Push once  dextrose 50% Injectable 25 Gram(s) IV Push once  dextrose 50% Injectable 25 Gram(s) IV Push once  glucagon  Injectable 1 milliGRAM(s) IntraMuscular once PRN  heparin  Injectable 5000 Unit(s) SubCutaneous every 8 hours  insulin glargine Injectable (LANTUS) 22 Unit(s) SubCutaneous at bedtime  insulin lispro (HumaLOG) corrective regimen sliding scale   SubCutaneous three times a day before meals  insulin lispro (HumaLOG) corrective regimen sliding scale   SubCutaneous at bedtime  insulin lispro Injectable (HumaLOG) 5 Unit(s) SubCutaneous three times a day before meals  lactated ringers. 1000 milliLiter(s) IV Continuous <Continuous>  losartan 25 milliGRAM(s) Oral daily  metoprolol tartrate 25 milliGRAM(s) Oral two times a day  simvastatin 20 milliGRAM(s) Oral at bedtime      PHYSICAL EXAM:    Vital Signs Last 24 Hrs  T(C): 37.1 (31 May 2019 23:00), Max: 37.3 (31 May 2019 17:13)  T(F): 98.8 (31 May 2019 23:00), Max: 99.2 (31 May 2019 17:13)  HR: 77 (2019 04:13) (66 - 92)  BP: 166/74 (2019 04:13) (126/85 - 178/71)  BP(mean): 103 (31 May 2019 16:00) (103 - 121)  RR: 20 (31 May 2019 18:45) (9 - 20)  SpO2: 94% (31 May 2019 18:45) (94% - 98%)  Daily     Daily Weight in k (31 May 2019 08:22)      Appearance:  NAD, alert.  C-collar is in place and intact  Non tender to palpation through out the paracervical muscles bilaterally  Sensation intact bilaterally UE and LE  2+ radial pulses; capillary refill <2 sec.  No cyanosis  5/5 strength bilateral ankle DF/PF, EHL/FHL  Motor exam:                           ShShrug       Biceps      Triceps     WF     WE      strength                   Right:      5/5             5/5           5/5         5/5     5/5       4/5              Left:        5/5             5/5           5/5         5/5     5/5       4/5        Primary Orthopedic Assessment:  • C6 right transverse process fracture        Plan:   • C-collar at all times  • WBAT with cervical collar  • MRI of the cervical spine pending  • DVT prophylaxis as prescribed, including use of compression devices and ankle pumps  • Pain control as clinically indicated  • Continue care a per primary team

## 2019-06-01 NOTE — PROGRESS NOTE ADULT - SUBJECTIVE AND OBJECTIVE BOX
Interval Events:  no overnight events  follow up on type 2 diabetes    patient seen and examined at bedside.  hyperglycemic  still with c collar but able to eat  has regular soda at bedside. unclear why. patient knows that he is not supposed to have regular soda  feeling "junky"    REVIEW OF SYSTEMS:    CONSTITUTIONAL: No fever, weight loss, or fatigue  EYES: No eye pain, visual disturbances, or discharge  ENMT:  No difficulty hearing, tinnitus, vertigo; No sinus or throat pain  NECK: No pain or stiffness  RESPIRATORY: No cough, wheezing, chills or hemoptysis; No shortness of breath  CARDIOVASCULAR: No chest pain, palpitations, dizziness, or leg swelling  GASTROINTESTINAL: No abdominal or epigastric pain. No nausea, vomiting, or hematemesis; No diarrhea or constipation. No melena or hematochezia.  NEUROLOGICAL: No headaches, memory loss, loss of strength, numbness, or tremors  SKIN: No itching, burning, rashes, or lesions   MUSCULOSKELETAL: No joint pain or swelling; No muscle, back, or extremity pain  PSYCHIATRIC: No depression, anxiety, mood swings, or difficulty sleeping        Allergy Status Unknown      MEDICATIONS  (STANDING):  ALBUTerol/ipratropium for Nebulization 3 milliLiter(s) Nebulizer every 6 hours  cholecalciferol 1000 Unit(s) Oral daily  dextrose 5%. 1000 milliLiter(s) (50 mL/Hr) IV Continuous <Continuous>  dextrose 50% Injectable 12.5 Gram(s) IV Push once  dextrose 50% Injectable 25 Gram(s) IV Push once  dextrose 50% Injectable 25 Gram(s) IV Push once  heparin  Injectable 5000 Unit(s) SubCutaneous every 8 hours  insulin glargine Injectable (LANTUS) 30 Unit(s) SubCutaneous at bedtime  insulin lispro (HumaLOG) corrective regimen sliding scale   SubCutaneous three times a day before meals  insulin lispro (HumaLOG) corrective regimen sliding scale   SubCutaneous at bedtime  insulin lispro Injectable (HumaLOG) 10 Unit(s) SubCutaneous three times a day before meals  losartan 25 milliGRAM(s) Oral daily  magnesium sulfate  IVPB 1 Gram(s) IV Intermittent once  metoprolol tartrate 25 milliGRAM(s) Oral two times a day  simvastatin 20 milliGRAM(s) Oral at bedtime    MEDICATIONS  (PRN):  ALBUTerol    0.083% 2.5 milliGRAM(s) Nebulizer every 4 hours PRN Shortness of Breath and/or Wheezing  dextrose 40% Gel 15 Gram(s) Oral once PRN Blood Glucose LESS THAN 70 milliGRAM(s)/deciliter  glucagon  Injectable 1 milliGRAM(s) IntraMuscular once PRN Glucose LESS THAN 70 milligrams/deciliter      Vital Signs Last 24 Hrs  T(C): 37.1 (01 Jun 2019 08:00), Max: 37.3 (31 May 2019 17:13)  T(F): 98.8 (01 Jun 2019 08:00), Max: 99.2 (31 May 2019 17:13)  HR: 90 (01 Jun 2019 15:03) (66 - 92)  BP: 145/88 (01 Jun 2019 08:00) (126/85 - 178/71)  BP(mean): 103 (31 May 2019 16:00) (103 - 103)  RR: 18 (01 Jun 2019 08:00) (9 - 20)  SpO2: 94% (01 Jun 2019 15:03) (92% - 98%)    PHYSICAL EXAM:    Constitutional: NAD, elderly  HEENT: EOMI, no exophalmos  Neck: c-collar in place  Respiratory: diffuse rhonchorous breath sounds  Cardiovascular: S1 and S2, RRR  Gastrointestinal: BS+, soft, ntnd  Extremities: No peripheral edema  Neurological: A/O x 3, no focal deficits  Psychiatric: Normal mood, normal affect        LABS  06-01    144  |  110<H>  |  12.0  ----------------------------<  233<H>  4.1   |  22.0  |  0.49<L>    Ca    9.6      01 Jun 2019 06:51  Phos  3.6     06-01  Mg     1.6     06-01                            8.1    12.7  )-----------( 183      ( 01 Jun 2019 07:00 )             27.1     Triglycerides, Serum: 99 mg/dL (06-01-19 @ 06:51)  HDL Cholesterol, Serum: 26 mg/dL (06-01-19 @ 06:51)  Cholesterol, Serum: 90 mg/dL (06-01-19 @ 06:51)    Hemoglobin A1C, Whole Blood: 12.1 % (05-31-19 @ 12:41)      CAPILLARY BLOOD GLUCOSE      POCT Blood Glucose.: 302 mg/dL (01 Jun 2019 11:39)  POCT Blood Glucose.: 226 mg/dL (01 Jun 2019 07:38)  POCT Blood Glucose.: 238 mg/dL (31 May 2019 21:46)  POCT Blood Glucose.: 161 mg/dL (31 May 2019 17:42)

## 2019-06-01 NOTE — PROGRESS NOTE ADULT - ASSESSMENT
72M w/ T2DM presents after fall down 12 stairs. Unknown if he takes blood thinners. Unknown LOC. Found at bottom of stairs by friends. Upon arrival, EMS states his GCS 13 (inappropriate speech). Unsure of mechanism how patient fell.     Uncontrolled T2DM- hyperglycemic  -A1c 12.1  -check sugars AC and bedtime  -ensure diabetic diet  -recommend the following     increase lantus to 30 units QHS     increase to lispro 8 units TID     continue with insulin sliding scale  -needs to be seen by CDE for insulin and meter teach  -needs to be seen by nutrition for diet education  -will need follow up in the clinic with us as mechanical fall could be related to diabetes    Hypernatremia- resolved. monitor for now    HLD- low HDL. can consider statin    Vitamin D deficiency- on OTC daily vitamin D

## 2019-06-01 NOTE — PHYSICAL THERAPY INITIAL EVALUATION ADULT - GAIT DEVIATIONS NOTED, PT EVAL
decreased step length/decreased stride length/decreased weight-shifting ability/decreased imtiaz/increased time in double stance

## 2019-06-01 NOTE — PHYSICAL THERAPY INITIAL EVALUATION ADULT - PERTINENT HX OF CURRENT PROBLEM, REHAB EVAL
"              After Visit Summary   9/27/2017    Asa Francis    MRN: [id number]           Patient Information     Date Of Birth          1969        Visit Information        Provider Department      9/27/2017 2:45 PM Jose Armando Cerda; Tech, [facility], [facility]        Today's Diagnoses     Palpitations           Follow-ups after your visit        Your next 10 appointments already scheduled     Oct 09, 2017  4:30 PM CDT   (Arrive by 4:15 PM)   NEW ARRHYTHMIA with Cuco Meza MD   [facility] ([facility])    [address] [place] [id number] [phone]              Who to contact     If you have questions or need follow up information about today's clinic visit or your schedule please contact [facility] directly at [phone].  Normal or non-critical lab and imaging results will be communicated to you by MyChart, letter or phone within 4 business days after the clinic has received the results. If you do not hear from us within 7 days, please contact the clinic through extraTKThart or phone. If you have a critical or abnormal lab result, we will notify you by phone as soon as possible.  Submit refill requests through [facility] or call your pharmacy and they will forward the refill request to us. Please allow 3 business days for your refill to be completed.          Additional Information About Your Visit        MyChart Information     [employer] lets you send messages to your doctor, view your test results, renew your prescriptions, schedule appointments and more. To sign up, go to [email or web address] . Click on \"Log in\" on the left side of the screen, which will take you to the Welcome page. Then click on \"Sign up Now\" on the right side of the page.     You will be asked to enter the access code listed below, as well as some personal information. Please follow the directions to create your username and password.   "   Your access code is: [email or web address]  Expires: 2017 12:39 AM     Your access code will  in 90 days. If you need help or a new code, please call your [facility] or [phone].        Care EveryWhere ID     This is your Care EveryWhere ID. This could be used by other organizations to access your [facility] medical records  [id number]         Blood Pressure from Last 3 Encounters:   17 107/71   17 112/75   03/03/15 93/62    Weight from Last 3 Encounters:   17 75.1 kg (165 lb 9.6 oz)   03/02/15 86.6 kg (191 lb)              We Performed the Following     Zio Patch Holter        Primary Care Provider Office Phone # Fax #    [facility] [phone]       [address] [place] [address]        Equal Access to Services     GENEVIEVE GIBBS : Hadii aad ku hadasho Soomaali, waaxda luqadaha, qaybta kaalmada adeegyada, waxay idiin hayzanen shyla wallace . So [facility] [phone].    ATENCIÓN: Si habla español, tiene a salazar disposición servicios gratuitos de asistencia lingüística. Llame al [phone].    We comply with applicable federal civil rights laws and [place] laws. We do not discriminate on the basis of race, color, national origin, age, disability, sex, sexual orientation, or gender identity.            Thank you!     Thank you for choosing [facility]  for your care. Our goal is always to provide you with excellent care. Hearing back from our patients is one way we can continue to improve our services. Please take a few minutes to complete the written survey that you may receive in the mail after your visit with us. Thank you!             Your Updated Medication List - Protect others around you: Learn how to safely use, store and throw away your medicines at www.disposemymeds.org.          This list is accurate as of: 17 11:59 PM.  Always use your most recent med list.                   Brand Name Dispense Instructions for use  Diagnosis    albuterol 108 (90 BASE) MCG/ACT Inhaler    PROAIR HFA/PROVENTIL HFA/VENTOLIN HFA     Inhale 2 puffs into the lungs every 6 hours        cholecalciferol 1000 UNIT tablet    vitamin D     Take 1,000 Units by mouth        cyanocobalamin 1000 MCG Tabs      Take 1,000 mcg by mouth        EUCERIN Lotn      Apply to face/ body twice daily as needed.        loratadine 10 MG tablet    CLARITIN     Take 10 mg by mouth        LORAZEPAM PO      Take 0.5 mg by mouth every 8 hours as needed for anxiety        meclizine 12.5 MG tablet    ANTIVERT    20 tablet    Take 1 tablet (12.5 mg) by mouth 3 times daily as needed for dizziness    Dizziness       MIRTAZAPINE PO      Take 45 mg by mouth At Bedtime        ondansetron 4 MG ODT tab    ZOFRAN-ODT    20 tablet    Take 1 tablet (4 mg) by mouth every 6 hours as needed for nausea    Nausea       pantoprazole 20 MG EC tablet    PROTONIX    30 tablet    Take by mouth 30-60 minutes before a meal.    Esophageal reflux          70 yo male admitted s/p fall down the stairs, with C6 TP fx

## 2019-06-01 NOTE — PHYSICAL THERAPY INITIAL EVALUATION ADULT - DISCHARGE DISPOSITION, PT EVAL
pending pt.'s tolerance to ambulation; unable to tolerate today secondary to c/o dizziness and refused further attempt/home

## 2019-06-01 NOTE — PROGRESS NOTE ADULT - SUBJECTIVE AND OBJECTIVE BOX
Internal Medicine Hospitalist - Dr. Glendy BUSCH    [id number]    69y      Male    Patient is a 69y old  Male who presents with a chief complaint of fall (31 May 2019 14:36)    INTERVAL HPI/ OVERNIGHT EVENTS: Patient is seen and examined, denied chest pain, SOB, abd. pain, nausea and vomiting.     REVIEW OF SYSTEMS:    Denied fever, chills, abd. pain, nausea, vomiting, chest pain, SOB, headache, dizziness    PHYSICAL EXAM:    Vital Signs Last 24 Hrs  T(C): 37.1 (01 Jun 2019 08:00), Max: 37.3 (31 May 2019 17:13)  T(F): 98.8 (01 Jun 2019 08:00), Max: 99.2 (31 May 2019 17:13)  HR: 90 (01 Jun 2019 15:03) (66 - 92)  BP: 145/88 (01 Jun 2019 08:00) (126/85 - 178/71)  BP(mean): 103 (31 May 2019 16:00) (103 - 103)  RR: 18 (01 Jun 2019 08:00) (9 - 20)  SpO2: 94% (01 Jun 2019 15:03) (92% - 98%)    GENERAL: NAD  Neck: neck collar  CHEST/LUNG: CTA b/l   HEART: S1S2+ audible  ABDOMEN: Soft, Nontender, Nondistended; Bowel sounds present  EXTREMITIES:  no edema  CNS: awake    LABS:                        8.1    12.7  )-----------( 183      ( 01 Jun 2019 07:00 )             27.1     06-01    144  |  110<H>  |  12.0  ----------------------------<  233<H>  4.1   |  22.0  |  0.49<L>    Ca    9.6      01 Jun 2019 06:51  Phos  3.6     06-01  Mg     1.6     06-01              MEDICATIONS  (STANDING):  ALBUTerol/ipratropium for Nebulization 3 milliLiter(s) Nebulizer every 6 hours  cholecalciferol 1000 Unit(s) Oral daily  dextrose 5%. 1000 milliLiter(s) (50 mL/Hr) IV Continuous <Continuous>  dextrose 50% Injectable 12.5 Gram(s) IV Push once  dextrose 50% Injectable 25 Gram(s) IV Push once  dextrose 50% Injectable 25 Gram(s) IV Push once  heparin  Injectable 5000 Unit(s) SubCutaneous every 8 hours  insulin glargine Injectable (LANTUS) 35 Unit(s) SubCutaneous at bedtime  insulin lispro (HumaLOG) corrective regimen sliding scale   SubCutaneous three times a day before meals  insulin lispro (HumaLOG) corrective regimen sliding scale   SubCutaneous at bedtime  insulin lispro Injectable (HumaLOG) 10 Unit(s) SubCutaneous three times a day before meals  lactated ringers. 1000 milliLiter(s) (125 mL/Hr) IV Continuous <Continuous>  losartan 25 milliGRAM(s) Oral daily  metoprolol tartrate 25 milliGRAM(s) Oral two times a day  simvastatin 20 milliGRAM(s) Oral at bedtime    MEDICATIONS  (PRN):  ALBUTerol    0.083% 2.5 milliGRAM(s) Nebulizer every 4 hours PRN Shortness of Breath and/or Wheezing  dextrose 40% Gel 15 Gram(s) Oral once PRN Blood Glucose LESS THAN 70 milliGRAM(s)/deciliter  glucagon  Injectable 1 milliGRAM(s) IntraMuscular once PRN Glucose LESS THAN 70 milligrams/deciliter      RADIOLOGY & ADDITIONAL TEST    < from: TTE Echo Complete w/Doppler (05.31.19 @ 09:37) >  Summary:   1. Left ventricular ejection fraction, by visual estimation, is 55 to   60%.   2. Normal global left ventricular systolic function.   3. Normal left ventricular internal cavity size.   4. The left ventricular diastolic function could not be assessed in this   study.   5. There is no evidence of pericardial effusion.   6. Limited Study bedside in CICU.    < end of copied text >

## 2019-06-01 NOTE — SWALLOW BEDSIDE ASSESSMENT ADULT - ASR SWALLOW ASPIRATION MONITOR
change of breathing pattern/cough/gurgly voice/pneumonia/upper respiratory infection/oral hygiene/position upright (90Y)/fever/throat clearing

## 2019-06-01 NOTE — PROGRESS NOTE ADULT - ASSESSMENT
73 yo M with C6 R TP Frx s/p fall down stairs     Continue management per primary team  Nsx on board - Keep C-Collar in place, follow final recommendations  PTOT/PMR - follow final recommendations for DC  No further trauma surgical intervention needed. Will sign off. Call with questions

## 2019-06-01 NOTE — SWALLOW BEDSIDE ASSESSMENT ADULT - SLP PERTINENT HISTORY OF CURRENT PROBLEM
Pt is a 72 year old male, with PMH remarkable for HTN, HLD, extensive lung CA on chemo, +liver mets, multiple areas c/w bone mets, ?colon ca admitted s/p fall with neck injury

## 2019-06-01 NOTE — PROGRESS NOTE ADULT - NSHPATTENDINGPLANDISCUSS_GEN_ALL_CORE
the patient.  All imaging and results of lab/other studies reviewed by me. All questions answered to their satisfaction. At this time they agree with the current plan of therapy.
Patient, ACS team, all questions answered

## 2019-06-01 NOTE — PROGRESS NOTE ADULT - ASSESSMENT
72yoM w/ pmh dm2 on insulin, htn, hld, lung cancer on chemo presenting sp unwitnessed fall down 12 stairs. noted to have presentation complicated by dka. downgraded from cicu to med floors 5/31/19.     #fall.   unwitnessed down 12 stairs  cva ruled out, cta H+N w/o clinically significant stenosis, however noted several chronic healed fx inc LT orbit medial wall, chronic BL nasal bone fx, LT zygomatic arch fx, poss sec to prior falls?  unclear if mechanical or syncope  trop neg x3  TTE LVEF of 55-60%  PT eval    #Extensive Lung Ca.   sp partial tx course w/ chemo via RT chest wall port, last session 2mo ago, pt lost to follow up  CT Chest w/ mediastinal lymphadenopathy + multiple BL masses + nodules w/ partial obstruction of bronchus intermedius + areas of tumor encasing lobar + segmental branches of pulm arteries, occ areas of cavitation noted  3.6 x 3.7cm liver mass also noted, as well as multiple areas consistent w/ bone mets, pedro luis to the BL ribs, consistent w/ stage 4 disease  palliative cs appreciated  Oncology consult requested    #Colon CA. ? dx  CT w/ suspicion for neoplasm of ascending colon, also w/ abd lymphadenopathy in addition to bone + liver mets as above  onc eval as above    #dka. resolved  in setting of uncontrolled chronic type 2dm on long term insulin therapy  sp insulin drip  uncontrolled, a1c 12.1, goal < 8  Fs still high - increase lantus 30, humalog 10 TID  FS and slifing scale      #hld.   on simvastatin 20mg @ home  resume, inc to high intensity, if ascvd risk high/very high  lipid panel in am    #RT C6 transverse process fx  post traumatic sec to fall  ortho eval appreciated  ccollar, clearance to remove per ortho + spine   MR CSpine pending    #BL mandibular dislocation. resolved  suspect sec to fall  sp reduction  appreciate speech - soft with thin liquid    #hepatitis C  unclear if pt aware of dx  reactive on routine screening  f/u hep c rna + genotype    #copd not o2 dependent. w/ mild exac  hold spiriva while nebs made standing, resume on dc  add prn nebs    #htn - improving  change iv lopressor to po, titrate to home dose 100mg bid as tolerated  resume losartan low dose, titrate to home dose 100mg as tolerated  also on norvasc 10mg, resume if needed after titrating other meds back to home doses  goal bp < 140/90, most ideally < 130/80    #vitamin d def.   on ergocalciferol as outpt  start vit d 1000u daily  level in am, if low resume high dose    #anemia.  no baseline labs  no acute s/s or acute/active bleed  likely related to malignancy  type + screen in am  transfuse for hgb < 7 or symptom onset    #hypernatremia - resolved    #dvt ppx. scd, sqh    #dispo. PT eval pending. per  pt unable to return to prior home, [facility] not accepting, will need placement, undetermined date of dc

## 2019-06-02 LAB
24R-OH-CALCIDIOL SERPL-MCNC: 26.4 NG/ML — LOW (ref 30–80)
GLUCOSE BLDC GLUCOMTR-MCNC: 157 MG/DL — HIGH (ref 70–99)
GLUCOSE BLDC GLUCOMTR-MCNC: 212 MG/DL — HIGH (ref 70–99)
GLUCOSE BLDC GLUCOMTR-MCNC: 231 MG/DL — HIGH (ref 70–99)
GLUCOSE BLDC GLUCOMTR-MCNC: 81 MG/DL — SIGNIFICANT CHANGE UP (ref 70–99)

## 2019-06-02 PROCEDURE — 72141 MRI NECK SPINE W/O DYE: CPT | Mod: 26

## 2019-06-02 PROCEDURE — 99232 SBSQ HOSP IP/OBS MODERATE 35: CPT

## 2019-06-02 RX ORDER — INSULIN GLARGINE 100 [IU]/ML
25 INJECTION, SOLUTION SUBCUTANEOUS AT BEDTIME
Refills: 0 | Status: DISCONTINUED | OUTPATIENT
Start: 2019-06-02 | End: 2019-06-04

## 2019-06-02 RX ORDER — INSULIN LISPRO 100/ML
8 VIAL (ML) SUBCUTANEOUS
Refills: 0 | Status: DISCONTINUED | OUTPATIENT
Start: 2019-06-02 | End: 2019-06-07

## 2019-06-02 RX ORDER — LOSARTAN POTASSIUM 100 MG/1
50 TABLET, FILM COATED ORAL DAILY
Refills: 0 | Status: DISCONTINUED | OUTPATIENT
Start: 2019-06-03 | End: 2019-06-07

## 2019-06-02 RX ORDER — METOPROLOL TARTRATE 50 MG
50 TABLET ORAL
Refills: 0 | Status: DISCONTINUED | OUTPATIENT
Start: 2019-06-02 | End: 2019-06-07

## 2019-06-02 RX ORDER — LOSARTAN POTASSIUM 100 MG/1
25 TABLET, FILM COATED ORAL ONCE
Refills: 0 | Status: COMPLETED | OUTPATIENT
Start: 2019-06-02 | End: 2019-06-02

## 2019-06-02 RX ADMIN — SIMVASTATIN 20 MILLIGRAM(S): 20 TABLET, FILM COATED ORAL at 22:15

## 2019-06-02 RX ADMIN — Medication 3 MILLILITER(S): at 15:23

## 2019-06-02 RX ADMIN — HEPARIN SODIUM 5000 UNIT(S): 5000 INJECTION INTRAVENOUS; SUBCUTANEOUS at 22:15

## 2019-06-02 RX ADMIN — Medication 1000 UNIT(S): at 12:41

## 2019-06-02 RX ADMIN — Medication 4: at 12:41

## 2019-06-02 RX ADMIN — HEPARIN SODIUM 5000 UNIT(S): 5000 INJECTION INTRAVENOUS; SUBCUTANEOUS at 05:27

## 2019-06-02 RX ADMIN — LOSARTAN POTASSIUM 25 MILLIGRAM(S): 100 TABLET, FILM COATED ORAL at 05:27

## 2019-06-02 RX ADMIN — HEPARIN SODIUM 5000 UNIT(S): 5000 INJECTION INTRAVENOUS; SUBCUTANEOUS at 13:06

## 2019-06-02 RX ADMIN — Medication 25 MILLIGRAM(S): at 05:27

## 2019-06-02 RX ADMIN — LOSARTAN POTASSIUM 25 MILLIGRAM(S): 100 TABLET, FILM COATED ORAL at 12:40

## 2019-06-02 RX ADMIN — ALBUTEROL 2.5 MILLIGRAM(S): 90 AEROSOL, METERED ORAL at 05:35

## 2019-06-02 RX ADMIN — Medication 3 MILLILITER(S): at 08:19

## 2019-06-02 RX ADMIN — Medication 3 MILLILITER(S): at 20:07

## 2019-06-02 RX ADMIN — Medication 8 UNIT(S): at 12:41

## 2019-06-02 RX ADMIN — Medication 2: at 17:25

## 2019-06-02 RX ADMIN — Medication 50 MILLIGRAM(S): at 17:29

## 2019-06-02 RX ADMIN — INSULIN GLARGINE 25 UNIT(S): 100 INJECTION, SOLUTION SUBCUTANEOUS at 22:14

## 2019-06-02 RX ADMIN — Medication 8 UNIT(S): at 17:25

## 2019-06-02 NOTE — CONSULT NOTE ADULT - SUBJECTIVE AND OBJECTIVE BOX
Hematology Oncology Consult Note    The patient was seen and examined at bedside.    PATO BUSCH is a 69y Male with stage IV lung cancer who presents s/p fall down 12 stairs.    He is my patient in the office and has not followed up recently.  He is s/p multiple therapies for lung cancer and hasn't tolerated chemo well in recent months, with increasing SOB and anemia.    He is now here s/p fall down the stairs and DKA.    He is feeling better since admission, but not well.    Interval History:    ROS   As per HPI, otherwise negative in detail    PAST MEDICAL & SURGICAL HISTORY:  No pertinent past medical history  No significant past surgical history      Allergies:  Allergy Status Unknown      Medications:  ALBUTerol    0.083% 2.5 milliGRAM(s) Nebulizer every 4 hours PRN  ALBUTerol/ipratropium for Nebulization 3 milliLiter(s) Nebulizer every 6 hours  cholecalciferol 1000 Unit(s) Oral daily  dextrose 40% Gel 15 Gram(s) Oral once PRN  dextrose 5%. 1000 milliLiter(s) IV Continuous <Continuous>  dextrose 50% Injectable 12.5 Gram(s) IV Push once  dextrose 50% Injectable 25 Gram(s) IV Push once  dextrose 50% Injectable 25 Gram(s) IV Push once  glucagon  Injectable 1 milliGRAM(s) IntraMuscular once PRN  heparin  Injectable 5000 Unit(s) SubCutaneous every 8 hours  insulin glargine Injectable (LANTUS) 25 Unit(s) SubCutaneous at bedtime  insulin lispro (HumaLOG) corrective regimen sliding scale   SubCutaneous three times a day before meals  insulin lispro (HumaLOG) corrective regimen sliding scale   SubCutaneous at bedtime  insulin lispro Injectable (HumaLOG) 8 Unit(s) SubCutaneous three times a day with meals  losartan 25 milliGRAM(s) Oral once  metoprolol tartrate 50 milliGRAM(s) Oral two times a day  simvastatin 20 milliGRAM(s) Oral at bedtime      Social History:    FAMILY HISTORY:  No pertinent family history in first degree relatives      PHYSICAL EXAM:    T(F): 98.7 (06-02-19 @ 08:00), Max: 98.8 (06-01-19 @ 16:33)  HR: 82 (06-02-19 @ 08:36) (59 - 92)  BP: 153/86 (06-02-19 @ 08:00) (153/86 - 161/85)  RR: 18 (06-02-19 @ 08:00) (18 - 18)  SpO2: 97% (06-02-19 @ 08:00) (90% - 97%)  Wt(kg): --    Daily     Daily     Gen: well developed, well nourished, comfortable  HEENT: normocephalic/atraumatic, no conjunctival pallor, no scleral icterus, no oral thrush/mucosal bleeding/mucositis  Neck: supple, no masses, no JVD  Cardiovascular: RR, nl S1S2, no murmurs/rubs/gallops  Respiratory: clear air entry b/l  Gastrointestinal: BS+, soft, NT/ND, no masses, no splenomegaly, no hepatomegaly, no evidence for ascites  Extremities: no clubbing/cyanosis, no edema, no calf tenderness  Neurological: no focal deficits  Skin: no rash on visible skin, excessive ecchymoses or petechiae  Lymph Nodes:  no significant peripheral adenopathy   Musculoskeletal:  wearing neck brace  Psychiatric:  mood stable            Labs:                          8.1    12.7  )-----------( 183      ( 01 Jun 2019 07:00 )             27.1     CBC Full  -  ( 01 Jun 2019 07:00 )  WBC Count : 12.7 K/uL  RBC Count : 3.65 M/uL  Hemoglobin : 8.1 g/dL  Hematocrit : 27.1 %  Platelet Count - Automated : 183 K/uL  Mean Cell Volume : 74.2 fl  Mean Cell Hemoglobin : 22.2 pg  Mean Cell Hemoglobin Concentration : 29.9 g/dL  Auto Neutrophil # : x  Auto Lymphocyte # : x  Auto Monocyte # : x  Auto Eosinophil # : x  Auto Basophil # : x  Auto Neutrophil % : x  Auto Lymphocyte % : x  Auto Monocyte % : x  Auto Eosinophil % : x  Auto Basophil % : x        06-01    144  |  110<H>  |  12.0  ----------------------------<  233<H>  4.1   |  22.0  |  0.49<L>    Ca    9.6      01 Jun 2019 06:51  Phos  3.6     06-01  Mg     1.6     06-01            Other Labs:    Cultures:    Pathology:    Imaging Studies:      Images:

## 2019-06-02 NOTE — PROGRESS NOTE ADULT - SUBJECTIVE AND OBJECTIVE BOX
Internal Medicine Hospitalist - Dr. Glendy BUSCH    [id number]    69y      Male    Patient is a 69y old  Male who presents with a chief complaint of dka (01 Jun 2019 15:06)    INTERVAL HPI/ OVERNIGHT EVENTS: Patient is seen and examined, no new event overnight, denied chest pain, SOB, abd. pain, nausea and vomiting    REVIEW OF SYSTEMS:    Denied fever, chills, abd. pain, nausea, vomiting, chest pain, SOB, headache, dizziness    PHYSICAL EXAM:    Vital Signs Last 24 Hrs  T(C): 37.1 (02 Jun 2019 08:00), Max: 37.1 (01 Jun 2019 16:33)  T(F): 98.7 (02 Jun 2019 08:00), Max: 98.8 (01 Jun 2019 16:33)  HR: 82 (02 Jun 2019 08:36) (59 - 92)  BP: 153/86 (02 Jun 2019 08:00) (153/86 - 161/85)  BP(mean): --  RR: 18 (02 Jun 2019 08:00) (18 - 18)  SpO2: 97% (02 Jun 2019 08:00) (90% - 97%)    GENERAL: NAD  Neck: neck collar  CHEST/LUNG: few rhonchi b/l  HEART: S1S2+ audible  ABDOMEN: Soft, Nontender, Nondistended; Bowel sounds present  EXTREMITIES:  no edema  CNS: Awake    LABS:                        8.1    12.7  )-----------( 183      ( 01 Jun 2019 07:00 )             27.1     06-01    144  |  110<H>  |  12.0  ----------------------------<  233<H>  4.1   |  22.0  |  0.49<L>    Ca    9.6      01 Jun 2019 06:51  Phos  3.6     06-01  Mg     1.6     06-01              MEDICATIONS  (STANDING):  ALBUTerol/ipratropium for Nebulization 3 milliLiter(s) Nebulizer every 6 hours  cholecalciferol 1000 Unit(s) Oral daily  dextrose 5%. 1000 milliLiter(s) (50 mL/Hr) IV Continuous <Continuous>  dextrose 50% Injectable 12.5 Gram(s) IV Push once  dextrose 50% Injectable 25 Gram(s) IV Push once  dextrose 50% Injectable 25 Gram(s) IV Push once  heparin  Injectable 5000 Unit(s) SubCutaneous every 8 hours  insulin glargine Injectable (LANTUS) 25 Unit(s) SubCutaneous at bedtime  insulin lispro (HumaLOG) corrective regimen sliding scale   SubCutaneous three times a day before meals  insulin lispro (HumaLOG) corrective regimen sliding scale   SubCutaneous at bedtime  insulin lispro Injectable (HumaLOG) 10 Unit(s) SubCutaneous three times a day before meals  losartan 25 milliGRAM(s) Oral daily  metoprolol tartrate 25 milliGRAM(s) Oral two times a day  simvastatin 20 milliGRAM(s) Oral at bedtime    MEDICATIONS  (PRN):  ALBUTerol    0.083% 2.5 milliGRAM(s) Nebulizer every 4 hours PRN Shortness of Breath and/or Wheezing  dextrose 40% Gel 15 Gram(s) Oral once PRN Blood Glucose LESS THAN 70 milliGRAM(s)/deciliter  glucagon  Injectable 1 milliGRAM(s) IntraMuscular once PRN Glucose LESS THAN 70 milligrams/deciliter      RADIOLOGY & ADDITIONAL TEST

## 2019-06-02 NOTE — CONSULT NOTE ADULT - ASSESSMENT
Mr. Rocha is a very pleasant 68 yo M with stage IV lung cancer admitted s/p fall and with DKA.    1. Lung cancer - he is s/p multiple therapies, including immunotherapy.  He has not tolerated chemotherapy well and has been a few months since he has received chemotherapy.    2. Colon lesion with liver mets - most likely related to lung cancer, however, this can be worked up out patient.  His lung cancer is very advanced that there may be no advantage to working this up.    3. Anemia - due to chemotherapy, he is at his baseline.  He has required transfusions in the past.  Monitor for now.  Transfuse to keep above 7 please.    Will follow.

## 2019-06-02 NOTE — PROGRESS NOTE ADULT - ASSESSMENT
72yoM w/ pmh dm2 on insulin, htn, hld, lung cancer on chemo presenting sp unwitnessed fall down 12 stairs. noted to have presentation complicated by dka. downgraded from cicu to med floors 5/31/19.     A/P    #fall.   unwitnessed down 12 stairs  cva ruled out, cta H+N w/o clinically significant stenosis, however noted several chronic healed fx inc LT orbit medial wall, chronic BL nasal bone fx, LT zygomatic arch fx, poss sec to prior falls?  unclear if mechanical or syncope  trop neg x3  TTE LVEF of 55-60%  PT eval    #Extensive Lung Ca.   sp partial tx course w/ chemo via RT chest wall port, last session 2mo ago, pt lost to follow up  CT Chest w/ mediastinal lymphadenopathy + multiple BL masses + nodules w/ partial obstruction of bronchus intermedius + areas of tumor encasing lobar + segmental branches of pulm arteries, occ areas of cavitation noted  3.6 x 3.7cm liver mass also noted, as well as multiple areas consistent w/ bone mets, pedro luis to the BL ribs, consistent w/ stage 4 disease  palliative cs appreciated  Oncology consult requested ON 06/01    #Colon CA. ? dx  CT w/ suspicion for neoplasm of ascending colon, also w/ abd lymphadenopathy in addition to bone + liver mets as above  onc eval as above    #dka. resolved  in setting of uncontrolled chronic type 2dm on long term insulin therapy  sp insulin drip  uncontrolled, a1c 12.1, goal < 8  Fs 81 - Decrease lantus 25, humalog 8 TID  FS and slifing scale      #hld.   on simvastatin 20mg @ home  resume, inc to high intensity, if ascvd risk high/very high  lipid panel in am    #RT C6 transverse process fx  post traumatic sec to fall  ortho eval appreciated  ccollar, clearance to remove per ortho + spine   MR CSpine pending    #BL mandibular dislocation. resolved  suspect sec to fall  sp reduction  appreciate speech - soft with thin liquid    #hepatitis C  reactive on routine screening  f/u hep c rna + genotype    #copd not o2 dependent. w/ mild exac  hold spiriva while nebs made standing, resume on dc  add prn nebs    #htn - improving  increase lopressor 50 and losartan 50, titrate to home dose depending on BP  goal bp < 140/90, most ideally < 130/80    #vitamin d def.   on ergocalciferol as outpt  start vit d 1000u daily  level in am, if low resume high dose    #anemia.  no baseline labs  no acute s/s or acute/active bleed  likely related to malignancy  type + screen in am  transfuse for hgb < 7 or symptom onset    #hypernatremia - resolved    #dvt ppx. scd, sqh    #dispo. per SW pt unable to return to prior home, [facility] not accepting, will need placement, undetermined date of dc

## 2019-06-03 LAB
ANION GAP SERPL CALC-SCNC: 11 MMOL/L — SIGNIFICANT CHANGE UP (ref 5–17)
BLD GP AB SCN SERPL QL: SIGNIFICANT CHANGE UP
BUN SERPL-MCNC: 11 MG/DL — SIGNIFICANT CHANGE UP (ref 8–20)
CALCIUM SERPL-MCNC: 8.8 MG/DL — SIGNIFICANT CHANGE UP (ref 8.6–10.2)
CHLORIDE SERPL-SCNC: 106 MMOL/L — SIGNIFICANT CHANGE UP (ref 98–107)
CO2 SERPL-SCNC: 24 MMOL/L — SIGNIFICANT CHANGE UP (ref 22–29)
CREAT SERPL-MCNC: 0.54 MG/DL — SIGNIFICANT CHANGE UP (ref 0.5–1.3)
GLUCOSE BLDC GLUCOMTR-MCNC: 105 MG/DL — HIGH (ref 70–99)
GLUCOSE BLDC GLUCOMTR-MCNC: 145 MG/DL — HIGH (ref 70–99)
GLUCOSE BLDC GLUCOMTR-MCNC: 224 MG/DL — HIGH (ref 70–99)
GLUCOSE BLDC GLUCOMTR-MCNC: 266 MG/DL — HIGH (ref 70–99)
GLUCOSE SERPL-MCNC: 162 MG/DL — HIGH (ref 70–115)
HCT VFR BLD CALC: 21.6 % — LOW (ref 42–52)
HCV RNA SERPL NAA DL=5-ACNC: HIGH IU/ML
HCV RNA SPEC NAA+PROBE-LOG IU: 7.04 LOGIU/ML — HIGH
HGB BLD-MCNC: 6.5 G/DL — CRITICAL LOW (ref 14–18)
MCHC RBC-ENTMCNC: 22.6 PG — LOW (ref 27–31)
MCHC RBC-ENTMCNC: 30.1 G/DL — LOW (ref 32–36)
MCV RBC AUTO: 75 FL — LOW (ref 80–94)
PLATELET # BLD AUTO: 202 K/UL — SIGNIFICANT CHANGE UP (ref 150–400)
POTASSIUM SERPL-MCNC: 3.1 MMOL/L — LOW (ref 3.5–5.3)
POTASSIUM SERPL-SCNC: 3.1 MMOL/L — LOW (ref 3.5–5.3)
RBC # BLD: 2.88 M/UL — LOW (ref 4.6–6.2)
RBC # FLD: 19.7 % — HIGH (ref 11–15.6)
SODIUM SERPL-SCNC: 141 MMOL/L — SIGNIFICANT CHANGE UP (ref 135–145)
TSH SERPL-MCNC: 0.28 UIU/ML — SIGNIFICANT CHANGE UP (ref 0.27–4.2)
TYPE + AB SCN PNL BLD: SIGNIFICANT CHANGE UP
WBC # BLD: 9.1 K/UL — SIGNIFICANT CHANGE UP (ref 4.8–10.8)
WBC # FLD AUTO: 9.1 K/UL — SIGNIFICANT CHANGE UP (ref 4.8–10.8)

## 2019-06-03 PROCEDURE — 99232 SBSQ HOSP IP/OBS MODERATE 35: CPT

## 2019-06-03 RX ORDER — POTASSIUM CHLORIDE 20 MEQ
40 PACKET (EA) ORAL ONCE
Refills: 0 | Status: COMPLETED | OUTPATIENT
Start: 2019-06-03 | End: 2019-06-03

## 2019-06-03 RX ADMIN — Medication 8 UNIT(S): at 17:27

## 2019-06-03 RX ADMIN — LOSARTAN POTASSIUM 50 MILLIGRAM(S): 100 TABLET, FILM COATED ORAL at 05:11

## 2019-06-03 RX ADMIN — Medication 8 UNIT(S): at 09:03

## 2019-06-03 RX ADMIN — Medication 3 MILLILITER(S): at 08:07

## 2019-06-03 RX ADMIN — Medication 6: at 17:27

## 2019-06-03 RX ADMIN — Medication 1000 UNIT(S): at 11:53

## 2019-06-03 RX ADMIN — Medication 50 MILLIGRAM(S): at 17:27

## 2019-06-03 RX ADMIN — Medication 50 MILLIGRAM(S): at 05:11

## 2019-06-03 RX ADMIN — INSULIN GLARGINE 25 UNIT(S): 100 INJECTION, SOLUTION SUBCUTANEOUS at 22:01

## 2019-06-03 RX ADMIN — HEPARIN SODIUM 5000 UNIT(S): 5000 INJECTION INTRAVENOUS; SUBCUTANEOUS at 05:11

## 2019-06-03 RX ADMIN — HEPARIN SODIUM 5000 UNIT(S): 5000 INJECTION INTRAVENOUS; SUBCUTANEOUS at 17:26

## 2019-06-03 RX ADMIN — Medication 3 MILLILITER(S): at 20:40

## 2019-06-03 RX ADMIN — Medication 3 MILLILITER(S): at 15:05

## 2019-06-03 RX ADMIN — HEPARIN SODIUM 5000 UNIT(S): 5000 INJECTION INTRAVENOUS; SUBCUTANEOUS at 22:00

## 2019-06-03 RX ADMIN — SIMVASTATIN 20 MILLIGRAM(S): 20 TABLET, FILM COATED ORAL at 22:01

## 2019-06-03 RX ADMIN — Medication 40 MILLIEQUIVALENT(S): at 17:27

## 2019-06-03 NOTE — PROGRESS NOTE ADULT - SUBJECTIVE AND OBJECTIVE BOX
INTERVAL HPI/OVERNIGHT EVENTS:  follow up of diabetes    MEDICATIONS  (STANDING):  ALBUTerol/ipratropium for Nebulization 3 milliLiter(s) Nebulizer every 6 hours  cholecalciferol 1000 Unit(s) Oral daily  dextrose 5%. 1000 milliLiter(s) (50 mL/Hr) IV Continuous <Continuous>  dextrose 50% Injectable 12.5 Gram(s) IV Push once  dextrose 50% Injectable 25 Gram(s) IV Push once  dextrose 50% Injectable 25 Gram(s) IV Push once  heparin  Injectable 5000 Unit(s) SubCutaneous every 8 hours  insulin glargine Injectable (LANTUS) 25 Unit(s) SubCutaneous at bedtime  insulin lispro (HumaLOG) corrective regimen sliding scale   SubCutaneous three times a day before meals  insulin lispro (HumaLOG) corrective regimen sliding scale   SubCutaneous at bedtime  insulin lispro Injectable (HumaLOG) 8 Unit(s) SubCutaneous three times a day with meals  losartan 50 milliGRAM(s) Oral daily  metoprolol tartrate 50 milliGRAM(s) Oral two times a day  simvastatin 20 milliGRAM(s) Oral at bedtime    MEDICATIONS  (PRN):  ALBUTerol    0.083% 2.5 milliGRAM(s) Nebulizer every 4 hours PRN Shortness of Breath and/or Wheezing  dextrose 40% Gel 15 Gram(s) Oral once PRN Blood Glucose LESS THAN 70 milliGRAM(s)/deciliter  glucagon  Injectable 1 milliGRAM(s) IntraMuscular once PRN Glucose LESS THAN 70 milligrams/deciliter      Allergies    Allergy Status Unknown    Intolerances        Review of systems: c/o dyspnea. Appetite fair    Vital Signs Last 24 Hrs  T(C): 37.1 (03 Jun 2019 08:00), Max: 37.1 (03 Jun 2019 08:00)  T(F): 98.7 (03 Jun 2019 08:00), Max: 98.7 (03 Jun 2019 08:00)  HR: 76 (03 Jun 2019 08:22) (73 - 95)  BP: 122/73 (03 Jun 2019 05:00) (122/73 - 155/89)  BP(mean): --  RR: 22 (03 Jun 2019 08:00) (18 - 22)  SpO2: 96% (03 Jun 2019 08:00) (88% - 96%)        LABS:                        6.5    9.1   )-----------( 202      ( 03 Jun 2019 08:00 )             21.6     06-03    141  |  106  |  11.0  ----------------------------<  162<H>  3.1<L>   |  24.0  |  0.54    Ca    8.8      03 Jun 2019 08:00            POCT Blood Glucose.: 145 mg/dL (06-03-19 @ 08:16)  POCT Blood Glucose.: 212 mg/dL (06-02-19 @ 22:13)  POCT Blood Glucose.: 157 mg/dL (06-02-19 @ 16:36)  POCT Blood Glucose.: 231 mg/dL (06-02-19 @ 12:12)  POCT Blood Glucose.: 81 mg/dL (06-02-19 @ 08:08)  POCT Blood Glucose.: 274 mg/dL (06-01-19 @ 22:20)  POCT Blood Glucose.: 291 mg/dL (06-01-19 @ 17:26)  POCT Blood Glucose.: 302 mg/dL (06-01-19 @ 11:39)  POCT Blood Glucose.: 226 mg/dL (06-01-19 @ 07:38)  POCT Blood Glucose.: 238 mg/dL (05-31-19 @ 21:46)  POCT Blood Glucose.: 161 mg/dL (05-31-19 @ 17:42)  POCT Blood Glucose.: 217 mg/dL (05-31-19 @ 14:05)

## 2019-06-03 NOTE — PROGRESS NOTE ADULT - ASSESSMENT
DM type 2, complicated by metastatic lung CA, insulin treated. Current control stable. ON much lower doses here than at home (Pt. quotes 50 units of basal insulin and 15-20 units of prandial insulin). Suggest continuing the current regimen to avoid hypoglycemia after discharge.

## 2019-06-03 NOTE — PROGRESS NOTE ADULT - SUBJECTIVE AND OBJECTIVE BOX
PATO BUSCH Patient is a 69y old  Male who presents with a chief complaint of Fall (02 Jun 2019 12:35)     HPI:  71yo male presents after fall down 12 stairs. Unknown if he takes blood thinners. Unknown LOC. Found at bottom of stairs by friends. Upon arrival, EMS states his GCS 13 (inappropriate speech). Unsure of mechanism how patient fell. Currently complains of neck pain.     Primary Survey:  A-Airway Intact  B-Breath Sounds [place] bilaterally  C-Central and peripheral pulses  D-GCS 13  E-no stepoffs or bony deformities    Unknown of medical hx (29 May 2019 19:56)    The patient was seen and evaluated   The patient is in no acute distress.  Denied any fever chest pain, palpitations, shortness of breath, abdominal pain, fever, dysuria, cough, edema   Complains of feeling weak and not well - doesn't really know what but doesn't feel well no CP, no SOB, no NVD    I&O's Summary    02 Jun 2019 07:01  -  03 Jun 2019 07:00  --------------------------------------------------------  IN: 0 mL / OUT: 1300 mL / NET: -1300 mL      Allergies    Allergy Status Unknown    Intolerances      HEALTH ISSUES - PROBLEM Dx:  Closed nondisplaced fracture of sixth cervical vertebra, unspecified fracture morphology, initial encounter: Closed nondisplaced fracture of sixth cervical vertebra, unspecified fracture morphology, initial encounter  Malignant neoplasm of hilus of right lung: Malignant neoplasm of hilus of right lung  Fall, initial encounter: Fall, initial encounter  Other closed displaced fracture of sixth cervical vertebra, initial encounter: Other closed displaced fracture of sixth cervical vertebra, initial encounter  Lung cancer: Lung cancer  DKA (diabetic ketoacidosis): DKA (diabetic ketoacidosis)  Cervical spine fracture: Cervical spine fracture        PAST MEDICAL & SURGICAL HISTORY:  No pertinent past medical history  No significant past surgical history          Vital Signs Last 24 Hrs  T(C): 37.1 (03 Jun 2019 08:00), Max: 37.1 (03 Jun 2019 08:00)  T(F): 98.7 (03 Jun 2019 08:00), Max: 98.7 (03 Jun 2019 08:00)  HR: 76 (03 Jun 2019 08:22) (73 - 95)  BP: 169/85 (03 Jun 2019 11:00) (122/73 - 169/85)  BP(mean): --  RR: 18 (03 Jun 2019 08:03) (18 - 22)  SpO2: 94% (03 Jun 2019 08:03) (91% - 96%)T(C): 37.1 (06-03-19 @ 08:00), Max: 37.1 (06-03-19 @ 08:00)  HR: 76 (06-03-19 @ 08:22) (73 - 95)  BP: 169/85 (06-03-19 @ 11:00) (122/73 - 169/85)  RR: 18 (06-03-19 @ 08:03) (18 - 22)  SpO2: 94% (06-03-19 @ 08:03) (91% - 96%)  Wt(kg): --    PHYSICAL EXAM:    GENERAL: NAD, sitting up in chair with cervical collar   HEAD:  Atraumatic, Normocephalic  NERVOUS SYSTEM:  Alert & Oriented X3,  Moves upper and lower extremities; CNS-II-XII  CHEST/LUNG: Clear to auscultation bilaterally; No rales, rhonchi, wheezing,   HEART: Regular rate and rhythm; No murmurs,   ABDOMEN: Soft, Nontender, Nondistended; Bowel sounds present  EXTREMITIES:  Peripheral Pulses, No  cyanosis, or edema  Psychiatry- mood and affect anxious     ALBUTerol    0.083% 2.5 milliGRAM(s) Nebulizer every 4 hours PRN  ALBUTerol/ipratropium for Nebulization 3 milliLiter(s) Nebulizer every 6 hours  cholecalciferol 1000 Unit(s) Oral daily  dextrose 40% Gel 15 Gram(s) Oral once PRN  dextrose 5%. 1000 milliLiter(s) IV Continuous <Continuous>  dextrose 50% Injectable 12.5 Gram(s) IV Push once  dextrose 50% Injectable 25 Gram(s) IV Push once  dextrose 50% Injectable 25 Gram(s) IV Push once  glucagon  Injectable 1 milliGRAM(s) IntraMuscular once PRN  heparin  Injectable 5000 Unit(s) SubCutaneous every 8 hours  insulin glargine Injectable (LANTUS) 25 Unit(s) SubCutaneous at bedtime  insulin lispro (HumaLOG) corrective regimen sliding scale   SubCutaneous three times a day before meals  insulin lispro (HumaLOG) corrective regimen sliding scale   SubCutaneous at bedtime  insulin lispro Injectable (HumaLOG) 8 Unit(s) SubCutaneous three times a day with meals  losartan 50 milliGRAM(s) Oral daily  metoprolol tartrate 50 milliGRAM(s) Oral two times a day  simvastatin 20 milliGRAM(s) Oral at bedtime      LABS:                          6.5    9.1   )-----------( 202      ( 03 Jun 2019 08:00 )             21.6     06-03    141  |  106  |  11.0  ----------------------------<  162<H>  3.1<L>   |  24.0  |  0.54    Ca    8.8      03 Jun 2019 08:00                CAPILLARY BLOOD GLUCOSE      POCT Blood Glucose.: 105 mg/dL (03 Jun 2019 11:52)  POCT Blood Glucose.: 145 mg/dL (03 Jun 2019 08:16)  POCT Blood Glucose.: 212 mg/dL (02 Jun 2019 22:13)  POCT Blood Glucose.: 157 mg/dL (02 Jun 2019 16:36)      RADIOLOGY & ADDITIONAL TESTS:      Consultant notes reviewed    Case discussed with consultant/provider/ family /patient

## 2019-06-03 NOTE — PROGRESS NOTE ADULT - SUBJECTIVE AND OBJECTIVE BOX
Patient seen and examined at bedside. comfortable in bed, pain controlled. No complaints. Denies acute motor/sensory changes.    Vital Signs Last 24 Hrs  T(C): 37.1 (03 Jun 2019 08:00), Max: 37.1 (03 Jun 2019 08:00)  T(F): 98.7 (03 Jun 2019 08:00), Max: 98.7 (03 Jun 2019 08:00)  HR: 76 (03 Jun 2019 08:22) (73 - 95)  BP: 122/73 (03 Jun 2019 05:00) (122/73 - 155/89)  BP(mean): --  RR: 22 (03 Jun 2019 08:00) (18 - 22)  SpO2: 96% (03 Jun 2019 08:00) (88% - 96%)    General: NAD  Neck: c collar noted, in place  UE: SILT B/L. motor 4+/5 throughout b/l. ext warm cap refill brisk B/L.  LE: SILT B/L. motor 4+/5 throughout b/l. ext warm cap refill brisk b/l. calf soft nT b/l    < from: MR Cervical Spine No Cont (06.02.19 @ 10:01) >   EXAM:  MR SPINE CERVICAL                          PROCEDURE DATE:  06/02/2019          INTERPRETATION:  Clinical indication: Fall. C6 fracture.    MRI of the cervical spine was performed using sagittal T1-T2 and STIR   sequence. Axial T1 and 2-D merge and T2-weighted sequences were performed   as well.    This exam is compared with prior CT scan cervical spine performed on May   29, 2019.    Loss of normal cervical lordosis is seen which could be due to positional   muscle spasm.    The vertebral body height alignment and marrow signal appear normal    T1 shortening is seen involving the C5-C6 vertebral bodies. This could be   compatible with focal areas of fat versus hemangiomas.    There appears to be a fracture seen through the C3 vertebral body .No   associated edema is seen to suggest an acute fracture.    Previously noted fracture involving the transverse process of C6 on the   right side is not well-demonstrated is study should be followed with CT   scan.    Disc desiccation seen throughout cervical spine region which is secondary   to degenerative changes.    C2-3: Disc bulge is seen with small central disc herniation. No   significant, as of the spinal canal is seen. Mild to moderate narrowing   of the left neural foramen is seen.    C3-4: Disc bulge and small central disc protrusion is seen. Bilateral   hypertrophic facet joint changes are seen. Moderate narrowing of the   spinal canal is seen. Mild to moderate narrowing of both neural foramen   is seen.    C4-5: Discosteophyte complex is seen. Hypertrophic change is seen   involving both facet and uncovertebral joints. Moderate to severe   narrowing spinal of the canal seen which is more prominent left side.   Moderate narrowing right neural foramen and severe narrowing left neural   foramen.    C5-6: Disc bulge and bilateral hypertrophic facet joint changes seen.   Mild to moderate narrowing of the spinal canal. Moderate narrowing of the   left neural foramen and moderate seen narrowing the right neural foramen.    C6-7: Disc bulge and bilateral hypertrophic facet joint changes are seen.   Moderate narrowing of the spinal canal is seen. Severe narrowing of both   neural foramen is seen.    C7-T1: Disc bulge and bilateral hypertrophic facet joint changesseen.   Mild narrowing of the spinal canal. Mild to moderate narrowing both   neural foramen is seen.    Spinal cord demonstrates normal signal.    Evaluation paraspinal soft tissues appear normal    Impression: Fracture suspected involving the C3 vertebral body which does   not appear acute or pathologic.    Previously noted right-sided transverse process fracture C6 is not well   demonstrated on this study.    Degenerative changes as described above.      OLIVIER CHURCHILL M.D., ATTENDING RADIOLOGIST  This document has been electronically signed. Jun 2 2019 10:12AM        < end of copied text >    A/P: 69 y.o M with C6 fx  cont c collar  awaiting further planning from attending  cont care primary team

## 2019-06-03 NOTE — PROGRESS NOTE ADULT - ASSESSMENT
72yoM w/ pmh dm2 on insulin, htn, hld, lung cancer on chemo presenting sp unwitnessed fall down 12 stairs. noted to have presentation complicated by dka. downgraded from cicu to med floors 5/31/19.     Anemia- Tx PRBC - Hgb below 7 - follow repat in AM     #fall. unwitnessed fall down 12 stairs  cva ruled out, cta H+N w/o clinically significant stenosis, however noted several chronic healed fx inc LT orbit medial wall, chronic BL nasal bone fx, LT zygomatic arch fx,   unclear if mechanical or syncope  trop neg x3  TTE LVEF of 55-60%  PT eval    #Extensive Lung Ca-sp partial tx course w/ chemo via RT chest wall port, last session 2mo ago, pt lost to follow up  CT Chest w/ mediastinal lymphadenopathy + multiple BL masses + nodules w/ partial obstruction of bronchus intermedius + areas of tumor encasing lobar + segmental branches of pulm arteries, occ areas of cavitation noted  3.6 x 3.7cm liver mass also noted, as well as multiple areas consistent w/ bone mets, pedro luis to the BL ribs, consistent w/ stage 4 disease  palliative cs appreciated  Oncology consult requested ON 06/01    #Colon CA. ? dx  CT w/ suspicion for neoplasm of ascending colon, also w/ abd lymphadenopathy in addition to bone + liver mets as above  onc eval as above    #DKA. resolved  in setting of uncontrolled chronic type 2dm on long term insulin therapy  sp insulin drip  uncontrolled, a1c 12.1, goal < 8  Fs 81 - Decrease lantus 25, humalog 8 TID  FS and sliding scale      #Hld. on simvastatin 20mg @ home  resume, inc to high intensity, if ascvd risk high/very high  lipid panel in am    #RT C6 transverse process fx post traumatic sec to fall  ortho eval appreciated  ccollar, clearance to remove per ortho + spine   MR C-Spine pending    #BL mandibular dislocation. resolved  suspect sec to fall  sp reduction  appreciate speech - soft with thin liquid    #Hepatitis C-reactive on routine screening  f/u hep c rna + genotype    #COPD not o2 dependent. w/ mild exac  hold spiriva while nebs made standing, resume on dc  add prn nebs    #htn - improving  increase lopressor 50 and losartan 50, titrate to home dose depending on BP  goal bp < 140/90, most ideally < 130/80    #vitamin d def.   on ergocalciferol as outpt  start vit d 1000u daily  level in am, if low resume high dose    #anemia.  no baseline labs  no acute s/s or acute/active bleed  likely related to malignancy  type + screen in am  transfuse for hgb < 7 or symptom onset    #hypernatremia - resolved    #dvt ppx. scd, sqh    #dispo. per SW pt unable to return to prior home, [facility] not accepting, will need placement, undetermined date of dc

## 2019-06-04 DIAGNOSIS — D64.9 ANEMIA, UNSPECIFIED: ICD-10-CM

## 2019-06-04 DIAGNOSIS — I10 ESSENTIAL (PRIMARY) HYPERTENSION: ICD-10-CM

## 2019-06-04 LAB
GLUCOSE BLDC GLUCOMTR-MCNC: 123 MG/DL — HIGH (ref 70–99)
GLUCOSE BLDC GLUCOMTR-MCNC: 230 MG/DL — HIGH (ref 70–99)
GLUCOSE BLDC GLUCOMTR-MCNC: 293 MG/DL — HIGH (ref 70–99)
GLUCOSE BLDC GLUCOMTR-MCNC: 76 MG/DL — SIGNIFICANT CHANGE UP (ref 70–99)
HCT VFR BLD CALC: 25 % — LOW (ref 42–52)
HGB BLD-MCNC: 7.7 G/DL — LOW (ref 14–18)
MCHC RBC-ENTMCNC: 23.3 PG — LOW (ref 27–31)
MCHC RBC-ENTMCNC: 30.8 G/DL — LOW (ref 32–36)
MCV RBC AUTO: 75.8 FL — LOW (ref 80–94)
PLATELET # BLD AUTO: 234 K/UL — SIGNIFICANT CHANGE UP (ref 150–400)
RBC # BLD: 3.3 M/UL — LOW (ref 4.6–6.2)
RBC # FLD: 19.5 % — HIGH (ref 11–15.6)
T4 FREE SERPL-MCNC: 1.1 NG/DL — SIGNIFICANT CHANGE UP (ref 0.9–1.8)
WBC # BLD: 9.5 K/UL — SIGNIFICANT CHANGE UP (ref 4.8–10.8)
WBC # FLD AUTO: 9.5 K/UL — SIGNIFICANT CHANGE UP (ref 4.8–10.8)

## 2019-06-04 PROCEDURE — 99232 SBSQ HOSP IP/OBS MODERATE 35: CPT

## 2019-06-04 PROCEDURE — 99233 SBSQ HOSP IP/OBS HIGH 50: CPT

## 2019-06-04 RX ORDER — INSULIN GLARGINE 100 [IU]/ML
22 INJECTION, SOLUTION SUBCUTANEOUS AT BEDTIME
Refills: 0 | Status: DISCONTINUED | OUTPATIENT
Start: 2019-06-04 | End: 2019-06-06

## 2019-06-04 RX ADMIN — HEPARIN SODIUM 5000 UNIT(S): 5000 INJECTION INTRAVENOUS; SUBCUTANEOUS at 13:11

## 2019-06-04 RX ADMIN — LOSARTAN POTASSIUM 50 MILLIGRAM(S): 100 TABLET, FILM COATED ORAL at 05:09

## 2019-06-04 RX ADMIN — Medication 50 MILLIGRAM(S): at 17:29

## 2019-06-04 RX ADMIN — INSULIN GLARGINE 22 UNIT(S): 100 INJECTION, SOLUTION SUBCUTANEOUS at 21:21

## 2019-06-04 RX ADMIN — HEPARIN SODIUM 5000 UNIT(S): 5000 INJECTION INTRAVENOUS; SUBCUTANEOUS at 05:09

## 2019-06-04 RX ADMIN — Medication 8 UNIT(S): at 13:11

## 2019-06-04 RX ADMIN — Medication 3 MILLILITER(S): at 14:48

## 2019-06-04 RX ADMIN — HEPARIN SODIUM 5000 UNIT(S): 5000 INJECTION INTRAVENOUS; SUBCUTANEOUS at 21:21

## 2019-06-04 RX ADMIN — Medication 50 MILLIGRAM(S): at 05:09

## 2019-06-04 RX ADMIN — SIMVASTATIN 20 MILLIGRAM(S): 20 TABLET, FILM COATED ORAL at 21:21

## 2019-06-04 RX ADMIN — Medication 6: at 13:10

## 2019-06-04 RX ADMIN — Medication 3 MILLILITER(S): at 20:40

## 2019-06-04 RX ADMIN — Medication 3 MILLILITER(S): at 03:37

## 2019-06-04 RX ADMIN — Medication 4: at 17:28

## 2019-06-04 RX ADMIN — Medication 8 UNIT(S): at 17:28

## 2019-06-04 RX ADMIN — Medication 1000 UNIT(S): at 13:11

## 2019-06-04 RX ADMIN — Medication 3 MILLILITER(S): at 09:00

## 2019-06-04 NOTE — PROGRESS NOTE ADULT - ASSESSMENT
Mr. Rocha is a 69M with known advance NSCLC admitted s/p fall with C6 transverse process fracture and DKA. Course complicated by bilateral mandibular dislocation subsequently reduced in SICU and acute anemia s/p transfusions.     PLAN    Acute Anemia  - transfused 1 unit PRBC today, possible secondary to rectal mass vs AOCD  - serial CBC, transfuse PRN, plan per primary team    DKA (resolved)/DM  - management per endo/primary team     C6 Transverse Process Fracture  - c/w c-collar, pain control, ortho following    Bilateral Mandibular Dislocation  - s/p reduction     NSCLC with mets to bone/liver/LN/?colon mass  - onc note reviewed, poor tolerance to chemo in the past and last session several months ago    Palliative Care Encounter  I attempted to engage discussion of advance care planning and goc. He was guarded to conversation, answered questions with simple short responses. When asked about his malignancy history, he verbalized plan to follow up with his oncologist when discharged. Chart reviewed, plan is for [facility] which pt/his friend joseanastasiacaroline Weinberg have elected several placed. SW/CM following for disposition. I will follow up for ongoing goc and in hopes of readdressing designation of HCP.

## 2019-06-04 NOTE — CHART NOTE - NSCHARTNOTEFT_GEN_A_CORE
Upon Nutritional Assessment by the Registered Dietitian your patient was determined to meet criteria / has evidence of the following diagnosis/diagnoses:          [ ]  Mild Protein Calorie Malnutrition        [x ]  Moderate Protein Calorie Malnutrition  chronic        [ ] Severe Protein Calorie Malnutrition        [ ] Unspecified Protein Calorie Malnutrition        [ ] Underweight / BMI <19        [ ] Morbid Obesity / BMI > 40      Findings as based on:  •  Comprehensive nutrition assessment and consultation  •  Calorie counts (nutrient intake analysis)  •  Food acceptance and intake status from observations by staff  •  Follow up  •  Patient education  •  Intervention secondary to interdisciplinary rounds  •   concerns      Treatment:    The following diet has been recommended: Rec add consistent CHO, add Glucerna shake BID      PROVIDER Section:     By signing this assessment you are acknowledging and agree with the diagnosis/diagnoses assigned by the Registered Dietitian    Comments:

## 2019-06-04 NOTE — PROGRESS NOTE ADULT - ASSESSMENT
72 yr old male with hypertension, hyperlipidemia, diabetes mellitus, advanced non small cell lung cancer presented to the ED as code trauma after fall from a flight of stairs. Imaging revealed a  traumatic C6 spinous process fracture, cervical collar was placed. Orthopedics consulted. He was admitted to SICU, medical ICU was called as patient was noted to be in DKA. Insulin gtt was initiated. CTA chest on admission also revealed Bilateral pulmonary masses and extensive mediastinal/abdominal adenopathy, consistent with neoplasm.Hepatic lesion, consistent with metastasis.Mural thickening of the ascending colon, possibly neoplasm. Possible lytic osseous metastases. Palliative care was consulted, patient was on chemotherapy but was lost to follow up. CTA neck was done, showed bilateral mandibular fractures, which was reduced in SICU. Endocrinology was consulted, DKA resolved. Lantus was initiated. Initially placed NPO given mandibular fractures. He was transferred to medical service on 5/31. Speech and swallow evaluation was done, advised mechanical soft diet. Oncology was consulted for metastatic lung cancer, colon lesion, advised patient has been on multiple therapies in the past for lung cancer, including immunotherapy, and he had not tolerated chemotherapy well. Advised outpatient follow up for further work up for colon lesion. 72 yr old male with hypertension, hyperlipidemia, diabetes mellitus, advanced non small cell lung cancer presented to the ED as code trauma after fall from a flight of stairs. Imaging revealed a  traumatic C6 spinous process fracture, cervical collar was placed. Orthopedics consulted. He was admitted to SICU, medical ICU was called as patient was noted to be in DKA. Insulin gtt was initiated. CTA chest on admission also revealed Bilateral pulmonary masses and extensive mediastinal/abdominal adenopathy, consistent with neoplasm.Hepatic lesion, consistent with metastasis.Mural thickening of the ascending colon, possibly neoplasm. Possible lytic osseous metastases. Palliative care was consulted, patient was on chemotherapy but was lost to follow up. CTA neck was done, showed bilateral mandibular fractures, which was reduced in SICU. Endocrinology was consulted, DKA resolved. Lantus was initiated. Initially placed NPO given mandibular fractures. He was transferred to medical service on 5/31. Speech and swallow evaluation was done, advised mechanical soft diet. Oncology was consulted for metastatic lung cancer, colon lesion, advised patient has been on multiple therapies in the past for lung cancer, including immunotherapy, and he had not tolerated chemotherapy well. Advised outpatient follow up for further work up for colon lesion. He received a PRBC transfusion on 6/3 for Hb 6.5, Hb improved to 7.7

## 2019-06-04 NOTE — CHART NOTE - NSCHARTNOTEFT_GEN_A_CORE
Source: Patient [x ]  Family [ ]   other [ ]    Current Diet:  [facility] soft  72 yr old male with hypertension, hyperlipidemia, diabetes mellitus, advanced non small cell lung cancer presented to the ED as code trauma after fall from a flight of stairs. Imaging revealed a  traumatic C6 spinous process fracture, cervical collar was placed, s/p reduction of mandibular fracture.    PO intake:  < 50% [ ]   50-75%  [ ]   %  [x ]  other :    Source for PO intake [x ] Patient [ ] family [ ] chart [ ] staff [ ] other    Enteral /Parenteral Nutrition:     Current Weight: 213# bed scale weight    % Weight Change     Pertinent Medications: MEDICATIONS  (STANDING):  ALBUTerol/ipratropium for Nebulization 3 milliLiter(s) Nebulizer every 6 hours  cholecalciferol 1000 Unit(s) Oral daily  dextrose 5%. 1000 milliLiter(s) (50 mL/Hr) IV Continuous <Continuous>  dextrose 50% Injectable 12.5 Gram(s) IV Push once  dextrose 50% Injectable 25 Gram(s) IV Push once  dextrose 50% Injectable 25 Gram(s) IV Push once  heparin  Injectable 5000 Unit(s) SubCutaneous every 8 hours  insulin glargine Injectable (LANTUS) 25 Unit(s) SubCutaneous at bedtime  insulin lispro (HumaLOG) corrective regimen sliding scale   SubCutaneous three times a day before meals  insulin lispro (HumaLOG) corrective regimen sliding scale   SubCutaneous at bedtime  insulin lispro Injectable (HumaLOG) 8 Unit(s) SubCutaneous three times a day with meals  losartan 50 milliGRAM(s) Oral daily  metoprolol tartrate 50 milliGRAM(s) Oral two times a day  simvastatin 20 milliGRAM(s) Oral at bedtime    MEDICATIONS  (PRN):  ALBUTerol    0.083% 2.5 milliGRAM(s) Nebulizer every 4 hours PRN Shortness of Breath and/or Wheezing  dextrose 40% Gel 15 Gram(s) Oral once PRN Blood Glucose LESS THAN 70 milliGRAM(s)/deciliter  glucagon  Injectable 1 milliGRAM(s) IntraMuscular once PRN Glucose LESS THAN 70 milligrams/deciliter    Pertinent Labs: CBC Full  -  ( 04 Jun 2019 08:16 )  WBC Count : 9.5 K/uL  RBC Count : 3.30 M/uL  Hemoglobin : 7.7 g/dL  Hematocrit : 25.0 %  Platelet Count - Automated : 234 K/uL  Mean Cell Volume : 75.8 fl  Mean Cell Hemoglobin : 23.3 pg  Mean Cell Hemoglobin Concentration : 30.8 g/dL  Auto Neutrophil # : x  Auto Lymphocyte # : x  Auto Monocyte # : x  Auto Eosinophil # : x  Auto Basophil # : x  Auto Neutrophil % : x  Auto Lymphocyte % : x  Auto Monocyte % : x  Auto Eosinophil % : x  Auto Basophil % : x      06-03 Na141 mmol/L Glu 162 mg/dL<H> K+ 3.1 mmol/L<L> Cr  0.54 mg/dL BUN 11.0 mg/dL Phos n/a   Alb n/a   PAB n/a   A1C 12.1        Skin:     Nutrition focused physical exam not conducted - found signs of malnutrition [ ]absent [x ]present    Subcutaneous fat loss: [x ] Orbital fat pads region, [ ]Buccal fat region, [ ]Triceps region,  [ ]Ribs region    Muscle wasting: [ x]Temples region, [ ]Clavicle region, [ ]Shoulder region, [ ]Scapula region, [ ]Interosseous region,  [ ]thigh region, [ ]Calf region    Estimated Needs:   [x ] no change since previous assessment  [ ] recalculated:     Current Nutrition Diagnosis: Pt with moderate chronic malnutrition related to inadequate energy intake in setting of lung ca with mets as evidenced by 50# weight loss in 6 mo, mild fat/muscle wasting, inconsistent adequate po intake. Currently pt eating good. Encouraged meal planning with plate model for improved A1C level.    Recommendations: add consistent CHO, Glucerna shake BID.     Monitoring and Evaluation:   [x ] PO intake [x ] Tolerance to diet prescription [X] Weights  [X] Follow up per protocol [X] Labs:

## 2019-06-04 NOTE — PROGRESS NOTE ADULT - SUBJECTIVE AND OBJECTIVE BOX
INTERVAL HPI/OVERNIGHT EVENTS: follow up of diabetes    MEDICATIONS  (STANDING):  ALBUTerol/ipratropium for Nebulization 3 milliLiter(s) Nebulizer every 6 hours  cholecalciferol 1000 Unit(s) Oral daily  dextrose 5%. 1000 milliLiter(s) (50 mL/Hr) IV Continuous <Continuous>  dextrose 50% Injectable 12.5 Gram(s) IV Push once  dextrose 50% Injectable 25 Gram(s) IV Push once  dextrose 50% Injectable 25 Gram(s) IV Push once  heparin  Injectable 5000 Unit(s) SubCutaneous every 8 hours  insulin glargine Injectable (LANTUS) 25 Unit(s) SubCutaneous at bedtime  insulin lispro (HumaLOG) corrective regimen sliding scale   SubCutaneous three times a day before meals  insulin lispro (HumaLOG) corrective regimen sliding scale   SubCutaneous at bedtime  insulin lispro Injectable (HumaLOG) 8 Unit(s) SubCutaneous three times a day with meals  losartan 50 milliGRAM(s) Oral daily  metoprolol tartrate 50 milliGRAM(s) Oral two times a day  simvastatin 20 milliGRAM(s) Oral at bedtime    MEDICATIONS  (PRN):  ALBUTerol    0.083% 2.5 milliGRAM(s) Nebulizer every 4 hours PRN Shortness of Breath and/or Wheezing  dextrose 40% Gel 15 Gram(s) Oral once PRN Blood Glucose LESS THAN 70 milliGRAM(s)/deciliter  glucagon  Injectable 1 milliGRAM(s) IntraMuscular once PRN Glucose LESS THAN 70 milligrams/deciliter      Allergies    Allergy Status Unknown    Intolerances        Review of systems: some nausea, but reasonable appetite    Vital Signs Last 24 Hrs  T(C): 36.3 (04 Jun 2019 11:58), Max: 37.6 (03 Jun 2019 20:30)  T(F): 97.4 (04 Jun 2019 11:58), Max: 99.6 (03 Jun 2019 20:30)  HR: 80 (04 Jun 2019 11:58) (71 - 93)  BP: 133/83 (04 Jun 2019 11:58) (126/81 - 161/76)  BP(mean): --  RR: 20 (04 Jun 2019 11:58) (18 - 20)  SpO2: 97% (04 Jun 2019 11:58) (90% - 98%)          LABS:                        7.7    9.5   )-----------( 234      ( 04 Jun 2019 08:16 )             25.0     06-03    141  |  106  |  11.0  ----------------------------<  162<H>  3.1<L>   |  24.0  |  0.54    Ca    8.8      03 Jun 2019 08:00            POCT Blood Glucose.: 293 mg/dL (06-04-19 @ 12:33)  POCT Blood Glucose.: 76 mg/dL (06-04-19 @ 08:09)  POCT Blood Glucose.: 224 mg/dL (06-03-19 @ 21:59)  POCT Blood Glucose.: 266 mg/dL (06-03-19 @ 17:19)  POCT Blood Glucose.: 105 mg/dL (06-03-19 @ 11:52)  POCT Blood Glucose.: 145 mg/dL (06-03-19 @ 08:16)  POCT Blood Glucose.: 212 mg/dL (06-02-19 @ 22:13)  POCT Blood Glucose.: 157 mg/dL (06-02-19 @ 16:36)  POCT Blood Glucose.: 231 mg/dL (06-02-19 @ 12:12)  POCT Blood Glucose.: 81 mg/dL (06-02-19 @ 08:08)  POCT Blood Glucose.: 274 mg/dL (06-01-19 @ 22:20)  POCT Blood Glucose.: 291 mg/dL (06-01-19 @ 17:26)

## 2019-06-04 NOTE — PROGRESS NOTE ADULT - ASSESSMENT
Mildly variable control of glucose. Will decrease basal insulin slightly to avoid overnight lows. Appears to need more prandial insulin, but I am reluctant to order more in view of potential variations in diet; pt. also not a candidate for tight control of diabetes in view of serious co-morbidities.

## 2019-06-04 NOTE — PROGRESS NOTE ADULT - SUBJECTIVE AND OBJECTIVE BOX
Images reviewed with Dr. Chowdhury. The patient is ortho stable and there will be no orthopedic surgical intervention at this time. Will sign off on patient. Images reviewed with Dr. Chowdhury. Continue C-collar. The patient is orthopedic stable and there will be no orthopedic surgical intervention at this time. Will sign off on patient.

## 2019-06-04 NOTE — PROGRESS NOTE ADULT - SUBJECTIVE AND OBJECTIVE BOX
FOLLOW UP VISIT    INTERVAL HPI/OVERNIGHT EVENTS:  Pt seen and examined at bedside. Seen eating lunch. He offered no acute complaints. Receiving prbc transfusion.      Present Symptoms:     Dyspnea:  No   Nausea/Vomiting:  No  Anxiety:  Yes    Fatigue:  Yes  Loss of appetite: Yes   Pain: No    Review of Systems: Reviewed, All others negative    MEDICATIONS  (STANDING):  ALBUTerol/ipratropium for Nebulization 3 milliLiter(s) Nebulizer every 6 hours  cholecalciferol 1000 Unit(s) Oral daily  dextrose 5%. 1000 milliLiter(s) (50 mL/Hr) IV Continuous <Continuous>  dextrose 50% Injectable 12.5 Gram(s) IV Push once  dextrose 50% Injectable 25 Gram(s) IV Push once  dextrose 50% Injectable 25 Gram(s) IV Push once  heparin  Injectable 5000 Unit(s) SubCutaneous every 8 hours  insulin glargine Injectable (LANTUS) 22 Unit(s) SubCutaneous at bedtime  insulin lispro (HumaLOG) corrective regimen sliding scale   SubCutaneous three times a day before meals  insulin lispro (HumaLOG) corrective regimen sliding scale   SubCutaneous at bedtime  insulin lispro Injectable (HumaLOG) 8 Unit(s) SubCutaneous three times a day with meals  losartan 50 milliGRAM(s) Oral daily  metoprolol tartrate 50 milliGRAM(s) Oral two times a day  simvastatin 20 milliGRAM(s) Oral at bedtime    MEDICATIONS  (PRN):  ALBUTerol    0.083% 2.5 milliGRAM(s) Nebulizer every 4 hours PRN Shortness of Breath and/or Wheezing  dextrose 40% Gel 15 Gram(s) Oral once PRN Blood Glucose LESS THAN 70 milliGRAM(s)/deciliter  glucagon  Injectable 1 milliGRAM(s) IntraMuscular once PRN Glucose LESS THAN 70 milligrams/deciliter      PHYSICAL EXAM:    Vital Signs Last 24 Hrs  T(C): 37.2 (04 Jun 2019 15:10), Max: 37.6 (03 Jun 2019 20:30)  T(F): 98.9 (04 Jun 2019 15:10), Max: 99.6 (03 Jun 2019 20:30)  HR: 86 (04 Jun 2019 15:10) (71 - 93)  BP: 139/82 (04 Jun 2019 15:10) (126/81 - 161/76)  BP(mean): --  RR: 20 (04 Jun 2019 15:10) (18 - 20)  SpO2: 98% (04 Jun 2019 15:10) (90% - 98%)    General: Sitting up in bed. No acute distress.   HEENT: mucous membrane moist    Lungs: clear to auscultation bilaterally.   non-labored.   CV: +s1/s2. Regular rate and rhythm.     GI: +bowel sound. abdomen soft, non-tender, non-distended   MSK: Moves all 4 extremities. No cyanosis. +edema of extremties. weakness.    Neuro: Awake and alert, ox3. Interactive.   Skin: warm and dry.   Psych: flat affect noted      LABS:                        7.7    9.5   )-----------( 234      ( 04 Jun 2019 08:16 )             25.0     06-03    141  |  106  |  11.0  ----------------------------<  162<H>  3.1<L>   |  24.0  |  0.54    Ca    8.8      03 Jun 2019 08:00        RADIOLOGY & ADDITIONAL STUDIES:     MRI C-Spine 6/2/19  Loss of normal cervical lordosis is seen which could be due to positional   muscle spasm.  The vertebral body height alignment and marrow signal appear normal  T1 shortening is seen involving the C5-C6 vertebral bodies. This could be   compatible with focal areas of fat versus hemangiomas.  There appears to be a fracture seen through the C3 vertebral body .No   associated edema is seen to suggest an acute fracture.  Previously noted fracture involving the transverse process of C6 on the   right side is not well-demonstrated is study should be followed with CT   scan.  Disc desiccation seen throughout cervical spine region which is secondary   to degenerative changes.  C2-3: Disc bulge is seen with small central disc herniation. No   significant, as of the spinal canal is seen. Mild to moderate narrowing   of the left neural foramen is seen.  C3-4: Disc bulge and small central disc protrusion is seen. Bilateral   hypertrophic facet joint changes are seen. Moderate narrowing of the   spinal canal is seen. Mild to moderate narrowing of both neural foramen   is seen.  C4-5: Disc osteophyte complex is seen. Hypertrophic change is seen   involving both facet and uncovertebral joints. Moderate to severe   narrowing spinal of the canal seen which is more prominent left side.   Moderate narrowing right neural foramen and severe narrowing left neural   foramen.  C5-6: Disc bulge and bilateral hypertrophic facet joint changes seen.   Mild to moderate narrowing of the spinal canal. Moderate narrowing of the   left neural foramen and moderate seen narrowing the right neural foramen.  C6-7: Disc bulge and bilateral hypertrophic facet joint changes are seen.   Moderate narrowing of the spinal canal is seen. Severe narrowing of both   neural foramen is seen.  C7-T1: Disc bulge and bilateral hypertrophic facet joint changes seen.   Mild narrowing of the spinal canal. Mild to moderate narrowing both   neural foramen is seen.  Spinal cord demonstrates normal signal.  Evaluation paraspinal soft tissues appear normal  Impression: Fracture suspected involving the C3 vertebral body which does   not appear acute or pathologic.  Previously noted right-sided transverse process fracture C6 is not well   demonstrated on this study.  Degenerative changes as described above.    ADVANCE DIRECTIVES: FULL CODE  DNR YES NO  Completed on:                     MOLST  YES NO   Completed on:  Living Will  YES NO   Completed on:

## 2019-06-04 NOTE — PROGRESS NOTE ADULT - SUBJECTIVE AND OBJECTIVE BOX
INTERVAL HPI/OVERNIGHT EVENTS:    CC:          Vital Signs Last 24 Hrs  T(C): 36.6 (04 Jun 2019 08:12), Max: 37.6 (03 Jun 2019 20:30)  T(F): 97.8 (04 Jun 2019 08:12), Max: 99.6 (03 Jun 2019 20:30)  HR: 71 (04 Jun 2019 08:12) (71 - 93)  BP: 126/81 (04 Jun 2019 08:12) (126/81 - 169/85)  BP(mean): --  RR: 19 (04 Jun 2019 08:12) (18 - 20)  SpO2: 98% (04 Jun 2019 08:12) (90% - 98%)    PHYSICAL EXAM:    GENERAL:  CHEST/LUNG:   HEART:   ABDOMEN:   EXTREMITIES:    MEDICATIONS  (STANDING):  ALBUTerol/ipratropium for Nebulization 3 milliLiter(s) Nebulizer every 6 hours  cholecalciferol 1000 Unit(s) Oral daily  dextrose 5%. 1000 milliLiter(s) (50 mL/Hr) IV Continuous <Continuous>  dextrose 50% Injectable 12.5 Gram(s) IV Push once  dextrose 50% Injectable 25 Gram(s) IV Push once  dextrose 50% Injectable 25 Gram(s) IV Push once  heparin  Injectable 5000 Unit(s) SubCutaneous every 8 hours  insulin glargine Injectable (LANTUS) 25 Unit(s) SubCutaneous at bedtime  insulin lispro (HumaLOG) corrective regimen sliding scale   SubCutaneous three times a day before meals  insulin lispro (HumaLOG) corrective regimen sliding scale   SubCutaneous at bedtime  insulin lispro Injectable (HumaLOG) 8 Unit(s) SubCutaneous three times a day with meals  losartan 50 milliGRAM(s) Oral daily  metoprolol tartrate 50 milliGRAM(s) Oral two times a day  simvastatin 20 milliGRAM(s) Oral at bedtime    MEDICATIONS  (PRN):  ALBUTerol    0.083% 2.5 milliGRAM(s) Nebulizer every 4 hours PRN Shortness of Breath and/or Wheezing  dextrose 40% Gel 15 Gram(s) Oral once PRN Blood Glucose LESS THAN 70 milliGRAM(s)/deciliter  glucagon  Injectable 1 milliGRAM(s) IntraMuscular once PRN Glucose LESS THAN 70 milligrams/deciliter      Allergies    Allergy Status Unknown    Intolerances          LABS:                          6.5    9.1   )-----------( 202      ( 03 Jun 2019 08:00 )             21.6     06-03    141  |  106  |  11.0  ----------------------------<  162<H>  3.1<L>   |  24.0  |  0.54    Ca    8.8      03 Jun 2019 08:00            RADIOLOGY & ADDITIONAL TESTS: INTERVAL HPI/OVERNIGHT EVENTS:    CC: anemia, cervical spine fracture, s/p fall, metastatic lung ca, diabetes mellitus, hypertension    Chart and events noted and reviewed. No overnight events, states mild nausea. Eating, denies pain.       Vital Signs Last 24 Hrs  T(C): 36.6 (04 Jun 2019 08:12), Max: 37.6 (03 Jun 2019 20:30)  T(F): 97.8 (04 Jun 2019 08:12), Max: 99.6 (03 Jun 2019 20:30)  HR: 71 (04 Jun 2019 08:12) (71 - 93)  BP: 126/81 (04 Jun 2019 08:12) (126/81 - 169/85)  BP(mean): --  RR: 19 (04 Jun 2019 08:12) (18 - 20)  SpO2: 98% (04 Jun 2019 08:12) (90% - 98%)    PHYSICAL EXAM:    GENERAL: alert, C collar in place, not in distress  CHEST/LUNG: b/l air entry, clear  HEART: regular  ABDOMEN: soft, BS+  EXTREMITIES: no edema, tenderness.     MEDICATIONS  (STANDING):  ALBUTerol/ipratropium for Nebulization 3 milliLiter(s) Nebulizer every 6 hours  cholecalciferol 1000 Unit(s) Oral daily  dextrose 5%. 1000 milliLiter(s) (50 mL/Hr) IV Continuous <Continuous>  dextrose 50% Injectable 12.5 Gram(s) IV Push once  dextrose 50% Injectable 25 Gram(s) IV Push once  dextrose 50% Injectable 25 Gram(s) IV Push once  heparin  Injectable 5000 Unit(s) SubCutaneous every 8 hours  insulin glargine Injectable (LANTUS) 25 Unit(s) SubCutaneous at bedtime  insulin lispro (HumaLOG) corrective regimen sliding scale   SubCutaneous three times a day before meals  insulin lispro (HumaLOG) corrective regimen sliding scale   SubCutaneous at bedtime  insulin lispro Injectable (HumaLOG) 8 Unit(s) SubCutaneous three times a day with meals  losartan 50 milliGRAM(s) Oral daily  metoprolol tartrate 50 milliGRAM(s) Oral two times a day  simvastatin 20 milliGRAM(s) Oral at bedtime    MEDICATIONS  (PRN):  ALBUTerol    0.083% 2.5 milliGRAM(s) Nebulizer every 4 hours PRN Shortness of Breath and/or Wheezing  dextrose 40% Gel 15 Gram(s) Oral once PRN Blood Glucose LESS THAN 70 milliGRAM(s)/deciliter  glucagon  Injectable 1 milliGRAM(s) IntraMuscular once PRN Glucose LESS THAN 70 milligrams/deciliter      Allergies    Allergy Status Unknown    Intolerances          LABS:                          6.5    9.1   )-----------( 202      ( 03 Jun 2019 08:00 )             21.6     06-03    141  |  106  |  11.0  ----------------------------<  162<H>  3.1<L>   |  24.0  |  0.54    Ca    8.8      03 Jun 2019 08:00            RADIOLOGY & ADDITIONAL TESTS:

## 2019-06-05 ENCOUNTER — TRANSCRIPTION ENCOUNTER (OUTPATIENT)
Age: 69
End: 2019-06-05

## 2019-06-05 DIAGNOSIS — D64.89 OTHER SPECIFIED ANEMIAS: ICD-10-CM

## 2019-06-05 LAB
ANION GAP SERPL CALC-SCNC: 9 MMOL/L — SIGNIFICANT CHANGE UP (ref 5–17)
BUN SERPL-MCNC: 9 MG/DL — SIGNIFICANT CHANGE UP (ref 8–20)
CALCIUM SERPL-MCNC: 8.7 MG/DL — SIGNIFICANT CHANGE UP (ref 8.6–10.2)
CHLORIDE SERPL-SCNC: 104 MMOL/L — SIGNIFICANT CHANGE UP (ref 98–107)
CO2 SERPL-SCNC: 27 MMOL/L — SIGNIFICANT CHANGE UP (ref 22–29)
CREAT SERPL-MCNC: 0.4 MG/DL — LOW (ref 0.5–1.3)
GLUCOSE BLDC GLUCOMTR-MCNC: 111 MG/DL — HIGH (ref 70–99)
GLUCOSE BLDC GLUCOMTR-MCNC: 118 MG/DL — HIGH (ref 70–99)
GLUCOSE BLDC GLUCOMTR-MCNC: 223 MG/DL — HIGH (ref 70–99)
GLUCOSE BLDC GLUCOMTR-MCNC: 244 MG/DL — HIGH (ref 70–99)
GLUCOSE BLDC GLUCOMTR-MCNC: 336 MG/DL — HIGH (ref 70–99)
GLUCOSE BLDC GLUCOMTR-MCNC: 447 MG/DL — HIGH (ref 70–99)
GLUCOSE BLDC GLUCOMTR-MCNC: 91 MG/DL — SIGNIFICANT CHANGE UP (ref 70–99)
GLUCOSE SERPL-MCNC: 113 MG/DL — SIGNIFICANT CHANGE UP (ref 70–115)
HCT VFR BLD CALC: 25.9 % — LOW (ref 42–52)
HGB BLD-MCNC: 7.6 G/DL — LOW (ref 14–18)
MAGNESIUM SERPL-MCNC: 1.4 MG/DL — LOW (ref 1.6–2.6)
MCHC RBC-ENTMCNC: 22.4 PG — LOW (ref 27–31)
MCHC RBC-ENTMCNC: 29.3 G/DL — LOW (ref 32–36)
MCV RBC AUTO: 76.2 FL — LOW (ref 80–94)
PHOSPHATE SERPL-MCNC: 3.1 MG/DL — SIGNIFICANT CHANGE UP (ref 2.4–4.7)
PLATELET # BLD AUTO: 212 K/UL — SIGNIFICANT CHANGE UP (ref 150–400)
POTASSIUM SERPL-MCNC: 3.9 MMOL/L — SIGNIFICANT CHANGE UP (ref 3.5–5.3)
POTASSIUM SERPL-SCNC: 3.9 MMOL/L — SIGNIFICANT CHANGE UP (ref 3.5–5.3)
RBC # BLD: 3.4 M/UL — LOW (ref 4.6–6.2)
RBC # FLD: 18.8 % — HIGH (ref 11–15.6)
SODIUM SERPL-SCNC: 140 MMOL/L — SIGNIFICANT CHANGE UP (ref 135–145)
WBC # BLD: 8.6 K/UL — SIGNIFICANT CHANGE UP (ref 4.8–10.8)
WBC # FLD AUTO: 8.6 K/UL — SIGNIFICANT CHANGE UP (ref 4.8–10.8)

## 2019-06-05 PROCEDURE — 99232 SBSQ HOSP IP/OBS MODERATE 35: CPT

## 2019-06-05 PROCEDURE — 99223 1ST HOSP IP/OBS HIGH 75: CPT

## 2019-06-05 PROCEDURE — 99497 ADVNCD CARE PLAN 30 MIN: CPT | Mod: 25

## 2019-06-05 PROCEDURE — 99233 SBSQ HOSP IP/OBS HIGH 50: CPT

## 2019-06-05 RX ORDER — MAGNESIUM SULFATE 500 MG/ML
2 VIAL (ML) INJECTION ONCE
Refills: 0 | Status: COMPLETED | OUTPATIENT
Start: 2019-06-05 | End: 2019-06-05

## 2019-06-05 RX ORDER — IPRATROPIUM/ALBUTEROL SULFATE 18-103MCG
3 AEROSOL WITH ADAPTER (GRAM) INHALATION ONCE
Refills: 0 | Status: COMPLETED | OUTPATIENT
Start: 2019-06-05 | End: 2019-06-05

## 2019-06-05 RX ADMIN — INSULIN GLARGINE 22 UNIT(S): 100 INJECTION, SOLUTION SUBCUTANEOUS at 21:25

## 2019-06-05 RX ADMIN — HEPARIN SODIUM 5000 UNIT(S): 5000 INJECTION INTRAVENOUS; SUBCUTANEOUS at 05:09

## 2019-06-05 RX ADMIN — Medication 1000 UNIT(S): at 12:43

## 2019-06-05 RX ADMIN — SIMVASTATIN 20 MILLIGRAM(S): 20 TABLET, FILM COATED ORAL at 21:25

## 2019-06-05 RX ADMIN — Medication 8 UNIT(S): at 17:42

## 2019-06-05 RX ADMIN — Medication 4: at 17:42

## 2019-06-05 RX ADMIN — Medication 50 MILLIGRAM(S): at 05:09

## 2019-06-05 RX ADMIN — Medication 50 GRAM(S): at 10:02

## 2019-06-05 RX ADMIN — LOSARTAN POTASSIUM 50 MILLIGRAM(S): 100 TABLET, FILM COATED ORAL at 05:09

## 2019-06-05 RX ADMIN — Medication 3 MILLILITER(S): at 08:39

## 2019-06-05 RX ADMIN — Medication 3 MILLILITER(S): at 03:12

## 2019-06-05 RX ADMIN — HEPARIN SODIUM 5000 UNIT(S): 5000 INJECTION INTRAVENOUS; SUBCUTANEOUS at 21:25

## 2019-06-05 RX ADMIN — Medication 3 MILLILITER(S): at 22:43

## 2019-06-05 RX ADMIN — Medication 50 MILLIGRAM(S): at 17:42

## 2019-06-05 RX ADMIN — HEPARIN SODIUM 5000 UNIT(S): 5000 INJECTION INTRAVENOUS; SUBCUTANEOUS at 13:58

## 2019-06-05 RX ADMIN — Medication 8 UNIT(S): at 08:15

## 2019-06-05 NOTE — DISCHARGE NOTE PROVIDER - CARE PROVIDERS DIRECT ADDRESSES
[email or web address],DirectAddress_Unknown,DirectAddress_Unknown

## 2019-06-05 NOTE — DISCHARGE NOTE PROVIDER - CARE PROVIDER_API CALL
Denver Chowdhury (DO)  Orthopaedic Surgery  [address]  [place] [address]  Phone: [phone]  Fax: [phone]  Follow Up Time: Denver Chowdhury ()  Orthopaedic Surgery  [address]  [place] [address]  Phone: [phone]  Fax: [phone]  Follow Up Time:     Louise Arana)  Hematology; Internal Medicine; Oncology  [address] [place] [address]  Phone: [phone]  Fax: [phone]  Follow Up Time:     Dejon Klein)  EndocrinologyMetabDiabetes; Internal Medicine  [address] [place] [address]  Phone: [phone]  Fax: [phone]  Follow Up Time:

## 2019-06-05 NOTE — PROGRESS NOTE ADULT - SUBJECTIVE AND OBJECTIVE BOX
FOLLOW UP VISIT    INTERVAL HPI/OVERNIGHT EVENTS:  OOB to chair this AM. He offer no acute complaint except being tired. No family present. Transfused 1 unit PRBC today.    Present Symptoms:     Dyspnea:  No   Nausea/Vomiting:  No  Fatigue:  Yes  Loss of appetite: Yes   Pain: No  Constipation: yes no BM x 3 days per pt    Review of Systems: Reviewed, All others negative    PHYSICAL EXAM:  Vital Signs Last 24 Hrs  T(C): 37 (05 Jun 2019 12:30), Max: 37.2 (04 Jun 2019 15:10)  T(F): 98.6 (05 Jun 2019 12:30), Max: 99 (05 Jun 2019 05:07)  HR: 78 (05 Jun 2019 12:30) (70 - 86)  BP: 134/79 (05 Jun 2019 12:30) (134/79 - 142/83)  BP(mean): --  RR: 18 (05 Jun 2019 12:30) (18 - 20)  SpO2: 95% (05 Jun 2019 12:30) (95% - 98%)    General: chronically ill. No acute distress.   HEENT: mucous membrane moist. C-collar in place  Lungs: clear to auscultation bilaterally.  non-labored.   CV: +s1/s2. Regular rate and rhythm.     GI: +bowel sound. abdomen soft, non-tender, non-distended   MSK: Moves all 4 extremities. No cyanosis. +edema of extremities weakness.    Neuro: Awake and alert, ox3. Interactive.   Skin: warm and dry.   Psych: flat affect      LABS:                        7.6    8.6   )-----------( 212      ( 05 Jun 2019 08:07 )             25.9     06-05    140  |  104  |  9.0  ----------------------------<  113  3.9   |  27.0  |  0.40<L>    Ca    8.7      05 Jun 2019 08:07  Phos  3.1     06-05  Mg     1.4     06-05    RADIOLOGY & ADDITIONAL STUDIES: reviewed, no  new studies    ADVANCE DIRECTIVES: FULL CODE  DNR YES NO  Completed on:                     MOLST  YES NO   Completed on:  Living Will  YES NO   Completed on:

## 2019-06-05 NOTE — CONSULT NOTE ADULT - PROBLEM SELECTOR RECOMMENDATION 9
- maybe secondary to chemo as per oncology note  - ask oncology if pt would benefit from IV FE or procrit  -Thickening of ascending colon- given C 6 fracture and  metastatic lung ca,- even if  colon mass is found , it may not change treatment ( pt did not tolerate chemo)  - will sign off
care per trauma team

## 2019-06-05 NOTE — DISCHARGE NOTE PROVIDER - PROVIDER TOKENS
PROVIDER:[TOKEN:[[id number]]] PROVIDER:[TOKEN:[[id number]]],PROVIDER:[TOKEN:[[id number]]],PROVIDER:[TOKEN:[[id number]]]

## 2019-06-05 NOTE — PROGRESS NOTE ADULT - ASSESSMENT
Mr. Rocha is a 69M with known advance NSCLC admitted s/p fall with C6 transverse process fracture and DKA. Course complicated by bilateral mandibular dislocation subsequently reduced in SICU and acute anemia s/p transfusions.     PLAN    Acute Anemia  - transfused 1 unit PRBC today, etiology chemo per onc  - serial CBC, transfuse PRN, plan per primary team    C6 Transverse Process Fracture  - c/w c-collar, pain control, ortho following    NSCLC with mets to bone/liver/LN/colon mass  - onc note reviewed, poor tolerance to chemo in the past and last session several months ago, workup for colon mass outpatient - however may not change treatment options    Palliative Care Encounter  Discussed with patient regarding his understanding of malignancy status. Of note, hospitalist reviewed recent findings of CT imaging earlier today. Pt continues to be guarded but appears to be mildly shock and then dismissive, stated "i guess we will see what we can do" and plans to f/u oncology. I do not get the sense he truly understands and taking seriously  the extent of his metastatic disease. I further inquired if his family are aware, states they know about the cancer diagnosis but nothing further. His sister calls often to check in and has visited. I asked with his permission to call and speak with her to update her of his hospital course and medical history; coordinate family meeting for goc and allow her to be extra support for him. Pt gave permission; called and s/w Mimi La [phone]. Sister rachelle pt often keeps things to himself, noncompliant with his medications and follow-ups which is one of the reason [facility] will not allow him to return to his residence due to concern of clinical decompensation. Mimi shared pt has 2 sons (1 passed recently after complications from MVA several yrs ago and another in long term). Mimi plans to come to hospital tomorrow at 11 AM to meet with pt and myself for ongoing goc.

## 2019-06-05 NOTE — CHART NOTE - NSCHARTNOTEFT_GEN_A_CORE
PA NOTE    Called to eval pt for wheezing  Pt is a 73 yo Male with H/O Right lung CA w/mets , anemia, diabetes mellitus, hypertension Admitted 2/2 cervical spine fracture, s/p fall   Albuterol Neb Tx d/c'd today  Pt on 4L NC O2 (baseline)  Pt denies: CP     T(C): 36.7 (05 Jun 2019 21:50), Max: 37.3 (05 Jun 2019 15:41)  T(F): 98 (05 Jun 2019 21:50), Max: 99.1 (05 Jun 2019 15:41)  HR: 79 (05 Jun 2019 21:50) (70 - 88)  BP: 163/82 (05 Jun 2019 21:50) (134/79 - 163/82)  RR: 20 (05 Jun 2019 21:50) (18 - 20)  SpO2: 95% (05 Jun 2019 21:50) (95% - 98%) on 4 L NC O2    General: WDWN Male NAD NC O2 in place no accessory muscle use  no retractions able to speak without a problem  Cardiac: RRR  Lungs: + scattered wheezing with scattered Rhonchi B/L  no crackles B/L  EXT: no c/c/e B/L L/E   Integument: good color warm/dry     A/P  Pt actively wheezing  no respiratory distress         Reordered Duoneb TX x 1 now          Reassess prn

## 2019-06-05 NOTE — DISCHARGE NOTE PROVIDER - NSDCFUADDINST_GEN_ALL_CORE_FT
Orthopedic Recommendations: There is no orthopedic intervention needed at this time. The patient is WBAT. The patient will continue wearing the Cervical collar. DVT ppx as per primary team. Follow up in the office outpatient in 2 weeks. Orthopedic Recommendations: There is no orthopedic intervention needed at this time. The patient is WBAT. The patient will continue wearing the Cervical collar. DVT ppx as per primary team. Follow up in the office outpatient in 2 weeks.    Continue medications as instructed, follow up with oncology and endocrinology. Monitor CBC.

## 2019-06-05 NOTE — DISCHARGE NOTE PROVIDER - NSDCCPCAREPLAN_GEN_ALL_CORE_FT
PRINCIPAL DISCHARGE DIAGNOSIS  Diagnosis: Fall  Assessment and Plan of Treatment:       SECONDARY DISCHARGE DIAGNOSES  Diagnosis: Cervical spine fracture  Assessment and Plan of Treatment:     Diagnosis: DKA (diabetic ketoacidosis)  Assessment and Plan of Treatment:

## 2019-06-05 NOTE — PROGRESS NOTE ADULT - ASSESSMENT
72 yr old male with hypertension, hyperlipidemia, diabetes mellitus, advanced non small cell lung cancer presented to the ED as code trauma after fall from a flight of stairs. Imaging revealed a  traumatic C6 spinous process fracture, cervical collar was placed. Orthopedics consulted. He was admitted to SICU, medical ICU was called as patient was noted to be in DKA. Insulin gtt was initiated. CTA chest on admission also revealed Bilateral pulmonary masses and extensive mediastinal/abdominal adenopathy, consistent with neoplasm.Hepatic lesion, consistent with metastasis.Mural thickening of the ascending colon, possibly neoplasm. Possible lytic osseous metastases. Palliative care was consulted, patient was on chemotherapy but was lost to follow up. CTA neck was done, showed bilateral mandibular fractures, which was reduced in SICU. Endocrinology was consulted, DKA resolved. Lantus was initiated. Initially placed NPO given mandibular fractures. He was transferred to medical service on 5/31. Speech and swallow evaluation was done, advised mechanical soft diet. Oncology was consulted for metastatic lung cancer, colon lesion, advised patient has been on multiple therapies in the past for lung cancer, including immunotherapy, and he had not tolerated chemotherapy well. Advised outpatient follow up for further work up for colon lesion. He received a PRBC transfusion on 6/3 for Hb 6.5, Hb improved to 7.7. He was given a 1 PRBC on 6/4, but Hb remained at 7.6. GI was consulted for further evaluation of colon mass noted on CT abdomen given persistent anemia and need for transfusions. Discussed goals of care with patient, he wishes to pursue with chemotherapy as an outpatient.

## 2019-06-05 NOTE — PROGRESS NOTE ADULT - SUBJECTIVE AND OBJECTIVE BOX
INTERVAL HPI/OVERNIGHT EVENTS:    CC: anemia, cervical spine fracture, s/p fall, metastatic lung ca, diabetes mellitus, hypertension      No overnight events, denies pain.     Vital Signs Last 24 Hrs  T(C): 36.8 (05 Jun 2019 08:05), Max: 37.2 (04 Jun 2019 15:10)  T(F): 98.2 (05 Jun 2019 08:05), Max: 99 (05 Jun 2019 05:07)  HR: 70 (05 Jun 2019 08:40) (70 - 86)  BP: 142/83 (05 Jun 2019 08:05) (138/66 - 142/83)  BP(mean): --  RR: 19 (05 Jun 2019 08:05) (19 - 20)  SpO2: 96% (05 Jun 2019 08:40) (95% - 98%)    PHYSICAL EXAM:    GENERAL: Not in distress, alert  CHEST/LUNG: b/l air entry  HEART: Regular   ABDOMEN: Soft, BS+, non tender  EXTREMITIES: No edema, tenderness.     MEDICATIONS  (STANDING):  ALBUTerol/ipratropium for Nebulization 3 milliLiter(s) Nebulizer every 6 hours  cholecalciferol 1000 Unit(s) Oral daily  dextrose 5%. 1000 milliLiter(s) (50 mL/Hr) IV Continuous <Continuous>  dextrose 50% Injectable 12.5 Gram(s) IV Push once  dextrose 50% Injectable 25 Gram(s) IV Push once  dextrose 50% Injectable 25 Gram(s) IV Push once  heparin  Injectable 5000 Unit(s) SubCutaneous every 8 hours  insulin glargine Injectable (LANTUS) 22 Unit(s) SubCutaneous at bedtime  insulin lispro (HumaLOG) corrective regimen sliding scale   SubCutaneous three times a day before meals  insulin lispro (HumaLOG) corrective regimen sliding scale   SubCutaneous at bedtime  insulin lispro Injectable (HumaLOG) 8 Unit(s) SubCutaneous three times a day with meals  losartan 50 milliGRAM(s) Oral daily  metoprolol tartrate 50 milliGRAM(s) Oral two times a day  simvastatin 20 milliGRAM(s) Oral at bedtime    MEDICATIONS  (PRN):  ALBUTerol    0.083% 2.5 milliGRAM(s) Nebulizer every 4 hours PRN Shortness of Breath and/or Wheezing  dextrose 40% Gel 15 Gram(s) Oral once PRN Blood Glucose LESS THAN 70 milliGRAM(s)/deciliter  glucagon  Injectable 1 milliGRAM(s) IntraMuscular once PRN Glucose LESS THAN 70 milligrams/deciliter      Allergies    Allergy Status Unknown    Intolerances          LABS:                          7.6    8.6   )-----------( 212      ( 05 Jun 2019 08:07 )             25.9     06-05    140  |  104  |  9.0  ----------------------------<  113  3.9   |  27.0  |  0.40<L>    Ca    8.7      05 Jun 2019 08:07  Phos  3.1     06-05  Mg     1.4     06-05            RADIOLOGY & ADDITIONAL TESTS:

## 2019-06-05 NOTE — CONSULT NOTE ADULT - SUBJECTIVE AND OBJECTIVE BOX
Patient is a 69y old  Male who presents with a chief complaint of fall DKA (03 Jun 2019 14:01)      HPI:  71yo male with hx of DM, HTN,  metastatic lung CA ( did not tolerated chemo /immuno therapy  and was lost to F/u) with lymphadenopathy, liver mets, ? bony mets.  PT fell down the stairs and sustained a C 6 fracture. Pt is C spine collar. Initially in SICU, then MICU when he went into DKA. Patient with anemia despite  blood transfusions.      Patient  was seen eating lunch. Took the C collar off while eating. Denies abdominal pain, constipation, diarrhea, no rectal bleeding, no melena. Pt refused rectal exam as he was in the middle of eating and transfusion. Hbs are in mid 7's. AS per oncology note, pt with chronic anemia. Hgb is 8 at baseline secondary to chemo. Thickening of the ascending colon on CT. As per oncology note, there may be no advantage to doing a  colon workup given the severity of his disease and inability to tolerate chemo      REVIEW OF SYSTEMS:  Constitutional: No fever, weight loss or fatigue  ENMT:  No difficulty hearing, tinnitus, vertigo; No sinus or throat pain  Respiratory: No cough, wheezing, chills or hemoptysis  Cardiovascular: No chest pain, palpitations, dizziness or leg swelling  Gastrointestinal: No abdominal or epigastric pain. No nausea, vomiting or hematemesis; No diarrhea or constipation. No melena or hematochezia.  Skin: No itching, burning, rashes or lesions   Musculoskeletal: No joint pain or swelling; No muscle, back or extremity pain    PAST MEDICAL & SURGICAL HISTORY:  No pertinent past medical history  No significant past surgical history      FAMILY HISTORY:  No pertinent family history in first degree relatives      SOCIAL HISTORY:  Smoking Status: [ ] Current, [ ] Former, [ ] Never  Pack Years:  [  ] EtOH-no  [  ] IVDA    MEDICATIONS:  MEDICATIONS  (STANDING):  ALBUTerol/ipratropium for Nebulization 3 milliLiter(s) Nebulizer every 6 hours  cholecalciferol 1000 Unit(s) Oral daily  dextrose 5%. 1000 milliLiter(s) (50 mL/Hr) IV Continuous <Continuous>  dextrose 50% Injectable 12.5 Gram(s) IV Push once  dextrose 50% Injectable 25 Gram(s) IV Push once  dextrose 50% Injectable 25 Gram(s) IV Push once  heparin  Injectable 5000 Unit(s) SubCutaneous every 8 hours  insulin glargine Injectable (LANTUS) 22 Unit(s) SubCutaneous at bedtime  insulin lispro (HumaLOG) corrective regimen sliding scale   SubCutaneous three times a day before meals  insulin lispro (HumaLOG) corrective regimen sliding scale   SubCutaneous at bedtime  insulin lispro Injectable (HumaLOG) 8 Unit(s) SubCutaneous three times a day with meals  losartan 50 milliGRAM(s) Oral daily  metoprolol tartrate 50 milliGRAM(s) Oral two times a day  simvastatin 20 milliGRAM(s) Oral at bedtime    MEDICATIONS  (PRN):  ALBUTerol    0.083% 2.5 milliGRAM(s) Nebulizer every 4 hours PRN Shortness of Breath and/or Wheezing  dextrose 40% Gel 15 Gram(s) Oral once PRN Blood Glucose LESS THAN 70 milliGRAM(s)/deciliter  glucagon  Injectable 1 milliGRAM(s) IntraMuscular once PRN Glucose LESS THAN 70 milligrams/deciliter      Allergies    Allergy Status Unknown    Intolerances        Vital Signs Last 24 Hrs  T(C): 37 (05 Jun 2019 12:30), Max: 37.2 (04 Jun 2019 15:10)  T(F): 98.6 (05 Jun 2019 12:30), Max: 99 (05 Jun 2019 05:07)  HR: 78 (05 Jun 2019 12:30) (70 - 86)  BP: 134/79 (05 Jun 2019 12:30) (134/79 - 142/83)  BP(mean): --  RR: 18 (05 Jun 2019 12:30) (18 - 20)  SpO2: 95% (05 Jun 2019 12:30) (95% - 98%)    06-04 @ 07:01  -  06-05 @ 07:00  --------------------------------------------------------  IN: 0 mL / OUT: 1260 mL / NET: -1260 mL          PHYSICAL EXAM:    General: Well developed; well nourished; in no acute distress  HEENT: MMM, conjunctiva and sclera clear  L-decreased breath sounds B/L  H- RRR  Gastrointestinal: Soft, non-tender non-distended; Normal bowel sounds; No rebound or guarding  Extremities: Normal range of motion, No clubbing, cyanosis or edema  Neurological: Alert and oriented x3  Skin: Warm and dry. No obvious rash      LABS:                        7.6    8.6   )-----------( 212      ( 05 Jun 2019 08:07 )             25.9     05 Jun 2019 08:07    140    |  104    |  9.0    ----------------------------<  113    3.9     |  27.0   |  0.40     Ca    8.7        05 Jun 2019 08:07  Phos  3.1       05 Jun 2019 08:07  Mg     1.4       05 Jun 2019 08:07                RADIOLOGY & ADDITIONAL STUDIES:     < from: CT Abdomen and Pelvis w/ IV Cont (05.29.19 @ 19:46) >  IMPRESSION:     Bilateral pulmonary masses and extensive mediastinal/abdominal   adenopathy, consistent with neoplasm.    Hepatic lesion, consistent with metastasis.    Mural thickening of the ascending colon, possibly neoplasm.    Possible lytic osseous metastases.      < end of copied text >

## 2019-06-05 NOTE — DISCHARGE NOTE PROVIDER - HOSPITAL COURSE
72 yr old male with hypertension, hyperlipidemia, diabetes mellitus, advanced non small cell lung cancer presented to the ED as code trauma after fall from a flight of stairs. Imaging revealed a  traumatic C6 spinous process fracture, cervical collar was placed. Orthopedics consulted. He was admitted to SICU, medical ICU was called as patient was noted to be in DKA. Insulin gtt was initiated. CTA chest on admission also revealed Bilateral pulmonary masses and extensive mediastinal/abdominal adenopathy, consistent with neoplasm.Hepatic lesion, consistent with metastasis.Mural thickening of the ascending colon, possibly neoplasm. Possible lytic osseous metastases. Palliative care was consulted, patient was on chemotherapy but was lost to follow up. CTA neck was done, showed bilateral mandibular fractures, which was reduced in SICU. Endocrinology was consulted, DKA resolved. Lantus was initiated. Initially placed NPO given mandibular fractures. He was transferred to medical service on 5/31. Speech and swallow evaluation was done, advised mechanical soft diet. Oncology was consulted for metastatic lung cancer, colon lesion, advised patient has been on multiple therapies in the past for lung cancer, including immunotherapy, and he had not tolerated chemotherapy well. Advised outpatient follow up for further work up for colon lesion. He received a PRBC transfusion on 6/3 for Hb 6.5, Hb improved to 7.7. He was given a 1 PRBC on 6/4, but Hb remained at 7.6. GI was consulted for further evaluation of colon mass noted on CT abdomen given persistent anemia and need for transfusions. Discussed goals of care with patient, he wishes to pursue with chemotherapy as an outpatient. He was given 2 PRBC transfusions on 6/5. He is medically stable for discharge to [facility].        Spent > 35 mins in discharge plan and documentation.

## 2019-06-05 NOTE — CONSULT NOTE ADULT - CONSULT REQUESTED DATE/TIME
29-May-2019 23:54
30-May-2019 13:27
29-May-2019 23:55
31-May-2019
02-Jun-2019 12:35
05-Jun-2019 13:41

## 2019-06-06 ENCOUNTER — TRANSCRIPTION ENCOUNTER (OUTPATIENT)
Age: 69
End: 2019-06-06

## 2019-06-06 DIAGNOSIS — E44.0 MODERATE PROTEIN-CALORIE MALNUTRITION: ICD-10-CM

## 2019-06-06 DIAGNOSIS — B19.20 UNSPECIFIED VIRAL HEPATITIS C WITHOUT HEPATIC COMA: ICD-10-CM

## 2019-06-06 LAB
ANION GAP SERPL CALC-SCNC: 12 MMOL/L — SIGNIFICANT CHANGE UP (ref 5–17)
BUN SERPL-MCNC: 12 MG/DL — SIGNIFICANT CHANGE UP (ref 8–20)
CALCIUM SERPL-MCNC: 9.2 MG/DL — SIGNIFICANT CHANGE UP (ref 8.6–10.2)
CHLORIDE SERPL-SCNC: 102 MMOL/L — SIGNIFICANT CHANGE UP (ref 98–107)
CO2 SERPL-SCNC: 24 MMOL/L — SIGNIFICANT CHANGE UP (ref 22–29)
CREAT SERPL-MCNC: 0.47 MG/DL — LOW (ref 0.5–1.3)
GLUCOSE BLDC GLUCOMTR-MCNC: 113 MG/DL — HIGH (ref 70–99)
GLUCOSE BLDC GLUCOMTR-MCNC: 136 MG/DL — HIGH (ref 70–99)
GLUCOSE BLDC GLUCOMTR-MCNC: 169 MG/DL — HIGH (ref 70–99)
GLUCOSE BLDC GLUCOMTR-MCNC: 206 MG/DL — HIGH (ref 70–99)
GLUCOSE BLDC GLUCOMTR-MCNC: 67 MG/DL — LOW (ref 70–99)
GLUCOSE BLDC GLUCOMTR-MCNC: 85 MG/DL — SIGNIFICANT CHANGE UP (ref 70–99)
GLUCOSE SERPL-MCNC: 175 MG/DL — HIGH (ref 70–115)
HCT VFR BLD CALC: 32.5 % — LOW (ref 42–52)
HCV GENTYP BLD NAA+PROBE: SIGNIFICANT CHANGE UP
HGB BLD-MCNC: 10.1 G/DL — LOW (ref 14–18)
MCHC RBC-ENTMCNC: 23.9 PG — LOW (ref 27–31)
MCHC RBC-ENTMCNC: 31.1 G/DL — LOW (ref 32–36)
MCV RBC AUTO: 77 FL — LOW (ref 80–94)
PLATELET # BLD AUTO: 236 K/UL — SIGNIFICANT CHANGE UP (ref 150–400)
POTASSIUM SERPL-MCNC: 4.5 MMOL/L — SIGNIFICANT CHANGE UP (ref 3.5–5.3)
POTASSIUM SERPL-SCNC: 4.5 MMOL/L — SIGNIFICANT CHANGE UP (ref 3.5–5.3)
RBC # BLD: 4.22 M/UL — LOW (ref 4.6–6.2)
RBC # FLD: 19.6 % — HIGH (ref 11–15.6)
SODIUM SERPL-SCNC: 138 MMOL/L — SIGNIFICANT CHANGE UP (ref 135–145)
WBC # BLD: 10.7 K/UL — SIGNIFICANT CHANGE UP (ref 4.8–10.8)
WBC # FLD AUTO: 10.7 K/UL — SIGNIFICANT CHANGE UP (ref 4.8–10.8)

## 2019-06-06 PROCEDURE — 99232 SBSQ HOSP IP/OBS MODERATE 35: CPT

## 2019-06-06 PROCEDURE — 99358 PROLONG SERVICE W/O CONTACT: CPT

## 2019-06-06 RX ORDER — INSULIN GLARGINE 100 [IU]/ML
15 INJECTION, SOLUTION SUBCUTANEOUS
Qty: 0 | Refills: 0 | DISCHARGE
Start: 2019-06-06

## 2019-06-06 RX ORDER — METOPROLOL TARTRATE 50 MG
1 TABLET ORAL
Qty: 0 | Refills: 0 | DISCHARGE
Start: 2019-06-06

## 2019-06-06 RX ORDER — LOSARTAN POTASSIUM 100 MG/1
1 TABLET, FILM COATED ORAL
Qty: 0 | Refills: 0 | DISCHARGE
Start: 2019-06-06

## 2019-06-06 RX ORDER — SIMVASTATIN 20 MG/1
1 TABLET, FILM COATED ORAL
Qty: 0 | Refills: 0 | DISCHARGE
Start: 2019-06-06

## 2019-06-06 RX ORDER — INSULIN GLARGINE 100 [IU]/ML
16 INJECTION, SOLUTION SUBCUTANEOUS AT BEDTIME
Refills: 0 | Status: DISCONTINUED | OUTPATIENT
Start: 2019-06-06 | End: 2019-06-07

## 2019-06-06 RX ADMIN — Medication 50 MILLIGRAM(S): at 17:11

## 2019-06-06 RX ADMIN — SIMVASTATIN 20 MILLIGRAM(S): 20 TABLET, FILM COATED ORAL at 21:36

## 2019-06-06 RX ADMIN — HEPARIN SODIUM 5000 UNIT(S): 5000 INJECTION INTRAVENOUS; SUBCUTANEOUS at 21:35

## 2019-06-06 RX ADMIN — Medication 8 UNIT(S): at 17:12

## 2019-06-06 RX ADMIN — Medication 4: at 13:02

## 2019-06-06 RX ADMIN — Medication 1000 UNIT(S): at 13:03

## 2019-06-06 RX ADMIN — HEPARIN SODIUM 5000 UNIT(S): 5000 INJECTION INTRAVENOUS; SUBCUTANEOUS at 13:02

## 2019-06-06 RX ADMIN — INSULIN GLARGINE 16 UNIT(S): 100 INJECTION, SOLUTION SUBCUTANEOUS at 23:00

## 2019-06-06 RX ADMIN — Medication 50 MILLIGRAM(S): at 05:53

## 2019-06-06 RX ADMIN — HEPARIN SODIUM 5000 UNIT(S): 5000 INJECTION INTRAVENOUS; SUBCUTANEOUS at 05:54

## 2019-06-06 RX ADMIN — LOSARTAN POTASSIUM 50 MILLIGRAM(S): 100 TABLET, FILM COATED ORAL at 05:54

## 2019-06-06 RX ADMIN — Medication 8 UNIT(S): at 13:03

## 2019-06-06 NOTE — DISCHARGE NOTE NURSING/CASE MANAGEMENT/SOCIAL WORK - NSDCDPATPORTLINK_GEN_ALL_CORE
You can access the [facility], offered by [facility], by registering with the following website: [email or web address]

## 2019-06-06 NOTE — PROGRESS NOTE ADULT - SUBJECTIVE AND OBJECTIVE BOX
INTERVAL HPI/OVERNIGHT EVENTS:  69yMale    Vital Signs Last 24 Hrs  T(C): 36.8 (06 Jun 2019 09:12), Max: 37.3 (05 Jun 2019 15:41)  T(F): 98.2 (06 Jun 2019 09:12), Max: 99.1 (05 Jun 2019 15:41)  HR: 76 (06 Jun 2019 09:12) (75 - 88)  BP: 145/76 (06 Jun 2019 09:12) (131/77 - 163/82)  BP(mean): --  RR: 18 (06 Jun 2019 09:12) (18 - 20)  SpO2: 98% (06 Jun 2019 09:12) (95% - 98%)    PHYSICAL EXAM:    GENERAL: NAD, well-groomed, well-developed  HEAD:  Atraumatic, Normocephalic  EYES: EOMI, PERRLA, conjunctiva and sclera clear  ENMT: Moist mucous membranes  NECK: Supple, No JVD  NERVOUS SYSTEM:  Alert & Oriented X3, Motor Strength 5/5 B/L upper and lower extremities; DTRs 2+ intact and symmetric  CHEST/LUNG: Clear to auscultation bilaterally; No rales, rhonchi, wheezing, or rubs  HEART: Regular rate and rhythm; No murmurs, rubs, or gallops  ABDOMEN: Soft, Nontender, Nondistended; Bowel sounds present  EXTREMITIES:  2+ Peripheral Pulses, No clubbing, cyanosis, or edema    MEDICATIONS  (STANDING):  cholecalciferol 1000 Unit(s) Oral daily  dextrose 5%. 1000 milliLiter(s) (50 mL/Hr) IV Continuous <Continuous>  dextrose 50% Injectable 12.5 Gram(s) IV Push once  dextrose 50% Injectable 25 Gram(s) IV Push once  dextrose 50% Injectable 25 Gram(s) IV Push once  heparin  Injectable 5000 Unit(s) SubCutaneous every 8 hours  insulin glargine Injectable (LANTUS) 16 Unit(s) SubCutaneous at bedtime  insulin lispro (HumaLOG) corrective regimen sliding scale   SubCutaneous three times a day before meals  insulin lispro (HumaLOG) corrective regimen sliding scale   SubCutaneous at bedtime  insulin lispro Injectable (HumaLOG) 8 Unit(s) SubCutaneous three times a day with meals  losartan 50 milliGRAM(s) Oral daily  metoprolol tartrate 50 milliGRAM(s) Oral two times a day  simvastatin 20 milliGRAM(s) Oral at bedtime    MEDICATIONS  (PRN):  dextrose 40% Gel 15 Gram(s) Oral once PRN Blood Glucose LESS THAN 70 milliGRAM(s)/deciliter  glucagon  Injectable 1 milliGRAM(s) IntraMuscular once PRN Glucose LESS THAN 70 milligrams/deciliter      Allergies    Allergy Status Unknown    Intolerances          LABS:                          7.6    8.6   )-----------( 212      ( 05 Jun 2019 08:07 )             25.9     06-05    140  |  104  |  9.0  ----------------------------<  113  3.9   |  27.0  |  0.40<L>    Ca    8.7      05 Jun 2019 08:07  Phos  3.1     06-05  Mg     1.4     06-05            RADIOLOGY & ADDITIONAL TESTS:

## 2019-06-06 NOTE — GOALS OF CARE CONVERSATION - PERSONAL ADVANCE DIRECTIVE - CONVERSATION DETAILS
SW met with patient earlier today to engage in supportive counseling regarding cancer dx and coping strategies. pt has difficulty using cancer word and refers to dx as my illness. Pt reports having no appetite prior while seeing oncologist but was difficult to engage in discussing his feelings regarding dx and treatment. KENNEDY educated patient regarding ACS support services in community. Pt has no complaints with pain despite s/p traumatic fall.
Family meeting with patient and his family (sister, nephew, landlord/close friend Lenard). Reviewed pt's medical history, hospital course and plan of care moving forward. Pt explained in the past he was on chemotherapy for lung cancer but stopped as he "did not like how it made me feel" (weakness, short of breath, nausea). Explored options including follow up with oncology regarding any potential treatment options vs. a comfort measure approach with hospice if he elects to forego any tx with understanding disease will continue to progress with potential symptom burden down the road. Pt was mostly quiet, nodding and acknowledge his understanding during our conversation. Per Lenard, states that pt is usually reserved, quiet and most times need encouragement to get a response. Family express good understanding of pt's overall guarded prognosis. Lenard shared passing of his brother recently from cancer who he cared for in the home with pt and encouraged pt to make decisions regarding CPR and mechanical intubation in the event of cardiac or pulmonary arrest. I also explained HCP and importance of designating someone he trust to make the best decision regarding his care in the event he was unconscious or did not have decision making capacity. MOLST and HCP form left for pt/family to further review.     At present time, plan is be discharged to [facility] for strengthening and deconditioning. Pt to discuss with family regarding further care after JESSICA. Lenard also informed pt that given his overall clinical decline and debility with dyspnea with exertion over last several months, pt would not be safe to return to their home due to multiple stairs (10+), likely to require LTC placement after JESSICA.    ~30 minutes spent collaborating with pt/family during meeting.

## 2019-06-06 NOTE — PROGRESS NOTE ADULT - ASSESSMENT
Mr. Rocha is a 69M with known advance NSCLC admitted s/p fall with C6 transverse process fracture and DKA. Course complicated by bilateral mandibular dislocation subsequently reduced in SICU and acute anemia s/p transfusions.     PLAN    Acute Anemia  - serial CBC, transfuse PRN, plan per primary team    C6 Transverse Process Fracture  - c/w c-collar, pain control, ortho following    NSCLC with mets to bone/liver/LN/colon mass  - pt desires to follow up with oncology after discharge to further discuss treatment options    Palliative Care Encounter  Family meeting held with pt and his family at bedside today, refer to go note. Plan at present is for discharge to [facility] when medically stable, pt desires to followup oncology regarding further tx options. ACP discussed, MOLST and HCP form provide to pt/family for them to further review.

## 2019-06-06 NOTE — PROGRESS NOTE ADULT - SUBJECTIVE AND OBJECTIVE BOX
Hematology Oncology Progress Note    The patient was seen and examined at bedside.    PATO BUSCH is a 69y Male with stage IV lung cancer who presents s/p fall down 12 stairs.    He is my patient in the office and has not followed up recently.  He is s/p multiple therapies for lung cancer and hasn't tolerated chemo well in recent months, with increasing SOB and anemia.    He is now here s/p fall down the stairs and DKA.    He is feeling better since admission, but not well.    Plan for discharge to rehab.      MEDICATIONS  (STANDING):  cholecalciferol 1000 Unit(s) Oral daily  dextrose 5%. 1000 milliLiter(s) (50 mL/Hr) IV Continuous <Continuous>  dextrose 50% Injectable 12.5 Gram(s) IV Push once  dextrose 50% Injectable 25 Gram(s) IV Push once  dextrose 50% Injectable 25 Gram(s) IV Push once  heparin  Injectable 5000 Unit(s) SubCutaneous every 8 hours  insulin glargine Injectable (LANTUS) 16 Unit(s) SubCutaneous at bedtime  insulin lispro (HumaLOG) corrective regimen sliding scale   SubCutaneous three times a day before meals  insulin lispro (HumaLOG) corrective regimen sliding scale   SubCutaneous at bedtime  insulin lispro Injectable (HumaLOG) 8 Unit(s) SubCutaneous three times a day with meals  losartan 50 milliGRAM(s) Oral daily  metoprolol tartrate 50 milliGRAM(s) Oral two times a day  simvastatin 20 milliGRAM(s) Oral at bedtime    MEDICATIONS  (PRN):  dextrose 40% Gel 15 Gram(s) Oral once PRN Blood Glucose LESS THAN 70 milliGRAM(s)/deciliter  glucagon  Injectable 1 milliGRAM(s) IntraMuscular once PRN Glucose LESS THAN 70 milligrams/deciliter      Vital Signs Last 24 Hrs  T(C): 36.8 (06 Jun 2019 09:12), Max: 37.3 (05 Jun 2019 15:41)  T(F): 98.2 (06 Jun 2019 09:12), Max: 99.1 (05 Jun 2019 15:41)  HR: 76 (06 Jun 2019 09:12) (75 - 88)  BP: 145/76 (06 Jun 2019 09:12) (131/77 - 163/82)  BP(mean): --  RR: 18 (06 Jun 2019 09:12) (18 - 20)  SpO2: 98% (06 Jun 2019 09:12) (95% - 98%)      Gen: well developed, well nourished, comfortable  HEENT: normocephalic/atraumatic, no conjunctival pallor, no scleral icterus, no oral thrush/mucosal bleeding/mucositis  Neck: supple, no masses, no JVD  Cardiovascular: RR, nl S1S2, no murmurs/rubs/gallops  Respiratory: clear air entry b/l  Gastrointestinal: BS+, soft, NT/ND, no masses, no splenomegaly, no hepatomegaly, no evidence for ascites  Extremities: no clubbing/cyanosis, no edema, no calf tenderness  Neurological: no focal deficits  Skin: no rash on visible skin, excessive ecchymoses or petechiae  Lymph Nodes:  no significant peripheral adenopathy   Musculoskeletal:  wearing neck brace  Psychiatric:  mood stable            Labs:                            10.1   10.7  )-----------( 236      ( 06 Jun 2019 13:03 )             32.5                             8.1    12.7  )-----------( 183      ( 01 Jun 2019 07:00 )             27.1     CBC Full  -  ( 01 Jun 2019 07:00 )  WBC Count : 12.7 K/uL  RBC Count : 3.65 M/uL  Hemoglobin : 8.1 g/dL  Hematocrit : 27.1 %  Platelet Count - Automated : 183 K/uL  Mean Cell Volume : 74.2 fl  Mean Cell Hemoglobin : 22.2 pg  Mean Cell Hemoglobin Concentration : 29.9 g/dL  Auto Neutrophil # : x  Auto Lymphocyte # : x  Auto Monocyte # : x  Auto Eosinophil # : x  Auto Basophil # : x  Auto Neutrophil % : x  Auto Lymphocyte % : x  Auto Monocyte % : x  Auto Eosinophil % : x  Auto Basophil % : x        06-01    144  |  110<H>  |  12.0  ----------------------------<  233<H>  4.1   |  22.0  |  0.49<L>    Ca    9.6      01 Jun 2019 06:51  Phos  3.6     06-01  Mg     1.6     06-01            Other Labs:    Cultures:    Pathology:    Imaging Studies:      Images:

## 2019-06-06 NOTE — PROGRESS NOTE ADULT - ASSESSMENT
Mr. Rocha is a very pleasant 70 yo M with stage IV lung cancer admitted s/p fall and with DKA.    1. Lung cancer - he is s/p multiple therapies, including immunotherapy.  He has not tolerated chemotherapy well and has been a few months since he has received chemotherapy.    2. Colon lesion with liver mets - most likely related to lung cancer, however, this can be worked up out patient.  His lung cancer is very advanced that there may be no advantage to working this up.    3. Anemia - due to chemotherapy, he is at his baseline.  He has required transfusions in the past.  Monitor for now.  Transfuse to keep above 7 please.    Will follow.    He knows to call me for follow up appt after discharge from rehab.  He verbalized to me today that he wishes to continue treatment for lung cancer.  Discussed with family at bedside as well.

## 2019-06-06 NOTE — PROGRESS NOTE ADULT - SUBJECTIVE AND OBJECTIVE BOX
FOLLOW UP VISIT    INTERVAL HPI/OVERNIGHT EVENTS:  Seen and examined earlier this AM, sister Mimi present. Pt reports doing well and offered no acute complaints.     Present Symptoms:     Dyspnea:  No   Nausea/Vomiting:  No  Fatigue:  Yes  Loss of appetite: Yes   Pain: No  Constipation: yes      Review of Systems: Reviewed, All others negative    PHYSICAL EXAM:  Vital Signs Last 24 Hrs  T(C): 36.6 (06 Jun 2019 15:25), Max: 37.2 (05 Jun 2019 23:18)  T(F): 97.9 (06 Jun 2019 15:25), Max: 98.9 (05 Jun 2019 23:18)  HR: 86 (06 Jun 2019 15:25) (75 - 87)  BP: 155/79 (06 Jun 2019 15:25) (131/77 - 163/82)  BP(mean): --  RR: 17 (06 Jun 2019 15:25) (17 - 20)  SpO2: 97% (06 Jun 2019 15:25) (95% - 98%)    General:   No acute distress.   HEENT: MMM. C-collar in place  Lungs: clear to auscultation bilaterally.  non-labored.   CV: +s1/s2. Regular rate and rhythm.     GI: +bowel sound. abdomen soft, non-tender, non-distended   MSK: Moves all 4 extremities. No cyanosis. +edema of extremities weakness.    Neuro: Awake and alert, ox3. Interactive.   Skin: warm and dry.   Psych: flat affect      LABS:                                   10.1   10.7  )-----------( 236      ( 06 Jun 2019 13:03 )             32.5   06-06    138  |  102  |  12.0  ----------------------------<  175<H>  4.5   |  24.0  |  0.47<L>    Ca    9.2      06 Jun 2019 13:03  Phos  3.1     06-05  Mg     1.4     06-05        RADIOLOGY & ADDITIONAL STUDIES: reviewed, no  new studies    ADVANCE DIRECTIVES: FULL CODE  DNR YES NO  Completed on:                     MOLST  YES NO   Completed on:  Living Will  YES NO   Completed on:

## 2019-06-07 VITALS
SYSTOLIC BLOOD PRESSURE: 156 MMHG | TEMPERATURE: 99 F | OXYGEN SATURATION: 97 % | RESPIRATION RATE: 18 BRPM | DIASTOLIC BLOOD PRESSURE: 80 MMHG | HEART RATE: 71 BPM

## 2019-06-07 LAB
ANION GAP SERPL CALC-SCNC: 10 MMOL/L — SIGNIFICANT CHANGE UP (ref 5–17)
BUN SERPL-MCNC: 10 MG/DL — SIGNIFICANT CHANGE UP (ref 8–20)
CALCIUM SERPL-MCNC: 9.4 MG/DL — SIGNIFICANT CHANGE UP (ref 8.6–10.2)
CHLORIDE SERPL-SCNC: 104 MMOL/L — SIGNIFICANT CHANGE UP (ref 98–107)
CO2 SERPL-SCNC: 27 MMOL/L — SIGNIFICANT CHANGE UP (ref 22–29)
CREAT SERPL-MCNC: 0.45 MG/DL — LOW (ref 0.5–1.3)
GLUCOSE BLDC GLUCOMTR-MCNC: 148 MG/DL — HIGH (ref 70–99)
GLUCOSE BLDC GLUCOMTR-MCNC: 170 MG/DL — HIGH (ref 70–99)
GLUCOSE SERPL-MCNC: 139 MG/DL — HIGH (ref 70–115)
HCT VFR BLD CALC: 30.4 % — LOW (ref 42–52)
HGB BLD-MCNC: 9.1 G/DL — LOW (ref 14–18)
MAGNESIUM SERPL-MCNC: 1.7 MG/DL — SIGNIFICANT CHANGE UP (ref 1.6–2.6)
MCHC RBC-ENTMCNC: 23 PG — LOW (ref 27–31)
MCHC RBC-ENTMCNC: 29.9 G/DL — LOW (ref 32–36)
MCV RBC AUTO: 76.8 FL — LOW (ref 80–94)
PHOSPHATE SERPL-MCNC: 3.4 MG/DL — SIGNIFICANT CHANGE UP (ref 2.4–4.7)
PLATELET # BLD AUTO: 223 K/UL — SIGNIFICANT CHANGE UP (ref 150–400)
POTASSIUM SERPL-MCNC: 3.9 MMOL/L — SIGNIFICANT CHANGE UP (ref 3.5–5.3)
POTASSIUM SERPL-SCNC: 3.9 MMOL/L — SIGNIFICANT CHANGE UP (ref 3.5–5.3)
RBC # BLD: 3.96 M/UL — LOW (ref 4.6–6.2)
RBC # FLD: 19.2 % — HIGH (ref 11–15.6)
SODIUM SERPL-SCNC: 141 MMOL/L — SIGNIFICANT CHANGE UP (ref 135–145)
WBC # BLD: 9 K/UL — SIGNIFICANT CHANGE UP (ref 4.8–10.8)
WBC # FLD AUTO: 9 K/UL — SIGNIFICANT CHANGE UP (ref 4.8–10.8)

## 2019-06-07 PROCEDURE — 86900 BLOOD TYPING SEROLOGIC ABO: CPT

## 2019-06-07 PROCEDURE — 71045 X-RAY EXAM CHEST 1 VIEW: CPT

## 2019-06-07 PROCEDURE — 84443 ASSAY THYROID STIM HORMONE: CPT

## 2019-06-07 PROCEDURE — 83690 ASSAY OF LIPASE: CPT

## 2019-06-07 PROCEDURE — 70498 CT ANGIOGRAPHY NECK: CPT

## 2019-06-07 PROCEDURE — 74177 CT ABD & PELVIS W/CONTRAST: CPT

## 2019-06-07 PROCEDURE — 86803 HEPATITIS C AB TEST: CPT

## 2019-06-07 PROCEDURE — 80053 COMPREHEN METABOLIC PANEL: CPT

## 2019-06-07 PROCEDURE — 36415 COLL VENOUS BLD VENIPUNCTURE: CPT

## 2019-06-07 PROCEDURE — 83605 ASSAY OF LACTIC ACID: CPT

## 2019-06-07 PROCEDURE — 99284 EMERGENCY DEPT VISIT MOD MDM: CPT | Mod: 25

## 2019-06-07 PROCEDURE — 97163 PT EVAL HIGH COMPLEX 45 MIN: CPT

## 2019-06-07 PROCEDURE — 97116 GAIT TRAINING THERAPY: CPT

## 2019-06-07 PROCEDURE — 84439 ASSAY OF FREE THYROXINE: CPT

## 2019-06-07 PROCEDURE — 94640 AIRWAY INHALATION TREATMENT: CPT

## 2019-06-07 PROCEDURE — 81001 URINALYSIS AUTO W/SCOPE: CPT

## 2019-06-07 PROCEDURE — 84484 ASSAY OF TROPONIN QUANT: CPT

## 2019-06-07 PROCEDURE — 96361 HYDRATE IV INFUSION ADD-ON: CPT

## 2019-06-07 PROCEDURE — 80061 LIPID PANEL: CPT

## 2019-06-07 PROCEDURE — 92526 ORAL FUNCTION THERAPY: CPT

## 2019-06-07 PROCEDURE — 82550 ASSAY OF CK (CPK): CPT

## 2019-06-07 PROCEDURE — 85027 COMPLETE CBC AUTOMATED: CPT

## 2019-06-07 PROCEDURE — 97110 THERAPEUTIC EXERCISES: CPT

## 2019-06-07 PROCEDURE — 93306 TTE W/DOPPLER COMPLETE: CPT

## 2019-06-07 PROCEDURE — 87521 HEPATITIS C PROBE&RVRS TRNSC: CPT

## 2019-06-07 PROCEDURE — 96374 THER/PROPH/DIAG INJ IV PUSH: CPT

## 2019-06-07 PROCEDURE — 86850 RBC ANTIBODY SCREEN: CPT

## 2019-06-07 PROCEDURE — 83735 ASSAY OF MAGNESIUM: CPT

## 2019-06-07 PROCEDURE — 82962 GLUCOSE BLOOD TEST: CPT

## 2019-06-07 PROCEDURE — 80307 DRUG TEST PRSMV CHEM ANLYZR: CPT

## 2019-06-07 PROCEDURE — 82803 BLOOD GASES ANY COMBINATION: CPT

## 2019-06-07 PROCEDURE — 71260 CT THORAX DX C+: CPT

## 2019-06-07 PROCEDURE — 87902 NFCT AGT GNTYP ALYS HEP C: CPT

## 2019-06-07 PROCEDURE — 87522 HEPATITIS C REVRS TRNSCRPJ: CPT

## 2019-06-07 PROCEDURE — 97530 THERAPEUTIC ACTIVITIES: CPT

## 2019-06-07 PROCEDURE — 70450 CT HEAD/BRAIN W/O DYE: CPT

## 2019-06-07 PROCEDURE — 97760 ORTHOTIC MGMT&TRAING 1ST ENC: CPT

## 2019-06-07 PROCEDURE — 92610 EVALUATE SWALLOWING FUNCTION: CPT

## 2019-06-07 PROCEDURE — 80048 BASIC METABOLIC PNL TOTAL CA: CPT

## 2019-06-07 PROCEDURE — 85730 THROMBOPLASTIN TIME PARTIAL: CPT

## 2019-06-07 PROCEDURE — P9016: CPT

## 2019-06-07 PROCEDURE — 99239 HOSP IP/OBS DSCHRG MGMT >30: CPT

## 2019-06-07 PROCEDURE — 83036 HEMOGLOBIN GLYCOSYLATED A1C: CPT

## 2019-06-07 PROCEDURE — 85610 PROTHROMBIN TIME: CPT

## 2019-06-07 PROCEDURE — 72125 CT NECK SPINE W/O DYE: CPT

## 2019-06-07 PROCEDURE — 70496 CT ANGIOGRAPHY HEAD: CPT

## 2019-06-07 PROCEDURE — 86901 BLOOD TYPING SEROLOGIC RH(D): CPT

## 2019-06-07 PROCEDURE — 36430 TRANSFUSION BLD/BLD COMPNT: CPT

## 2019-06-07 PROCEDURE — 82306 VITAMIN D 25 HYDROXY: CPT

## 2019-06-07 PROCEDURE — 93005 ELECTROCARDIOGRAM TRACING: CPT

## 2019-06-07 PROCEDURE — 84100 ASSAY OF PHOSPHORUS: CPT

## 2019-06-07 PROCEDURE — 82009 KETONE BODYS QUAL: CPT

## 2019-06-07 PROCEDURE — 72141 MRI NECK SPINE W/O DYE: CPT

## 2019-06-07 PROCEDURE — 86923 COMPATIBILITY TEST ELECTRIC: CPT

## 2019-06-07 RX ORDER — IPRATROPIUM/ALBUTEROL SULFATE 18-103MCG
3 AEROSOL WITH ADAPTER (GRAM) INHALATION ONCE
Refills: 0 | Status: COMPLETED | OUTPATIENT
Start: 2019-06-07 | End: 2019-06-07

## 2019-06-07 RX ADMIN — Medication 2: at 12:19

## 2019-06-07 RX ADMIN — Medication 8 UNIT(S): at 08:28

## 2019-06-07 RX ADMIN — Medication 1000 UNIT(S): at 12:19

## 2019-06-07 RX ADMIN — HEPARIN SODIUM 5000 UNIT(S): 5000 INJECTION INTRAVENOUS; SUBCUTANEOUS at 12:19

## 2019-06-07 RX ADMIN — Medication 8 UNIT(S): at 12:19

## 2019-06-07 RX ADMIN — HEPARIN SODIUM 5000 UNIT(S): 5000 INJECTION INTRAVENOUS; SUBCUTANEOUS at 05:17

## 2019-06-07 RX ADMIN — Medication 50 MILLIGRAM(S): at 05:17

## 2019-06-07 RX ADMIN — LOSARTAN POTASSIUM 50 MILLIGRAM(S): 100 TABLET, FILM COATED ORAL at 05:17

## 2019-06-07 RX ADMIN — Medication 3 MILLILITER(S): at 14:13

## 2019-06-07 NOTE — PROGRESS NOTE ADULT - PROBLEM SELECTOR PROBLEM 3
Malignant neoplasm of hilus of right lung

## 2019-06-07 NOTE — PROGRESS NOTE ADULT - PROBLEM SELECTOR PROBLEM 4
DKA (diabetic ketoacidosis)

## 2019-06-07 NOTE — PROGRESS NOTE ADULT - PROBLEM SELECTOR PROBLEM 2
Closed nondisplaced fracture of sixth cervical vertebra, unspecified fracture morphology, initial encounter
Fall, initial encounter

## 2019-06-07 NOTE — PROGRESS NOTE ADULT - PROBLEM SELECTOR PLAN 3
Oncology evaluation noted, outpatient follow up for lung ca. He wishes to pursue with chemotherapy upon discharge.  Palliative care input appreciated.
Oncology evaluation noted, outpatient follow up for lung ca and colon ca.
Oncology evaluation noted, outpatient follow up for lung ca. He wishes to pursue with chemotherapy.
Outpatient follow up with oncology.

## 2019-06-07 NOTE — PROGRESS NOTE ADULT - ATTENDING COMMENTS
D/W pt, RN, palliative SW, hospitalist Dr. Marie
D/W pt, pt's family, RN, CM/SW
D/W pt, pt's sister, Hospitalist Dr. Marie
Patient seen and examined,   no new issues.  Appreciate medicine input,.  call with questions
Seen and examined.     Labs and imaging personally reviewed.    NAD  dry MM  RRR  non labored resp  somnolent, slurred speech, slow to respond  abd soft  poor skin turgor    C6 right TP fx near vertebral artery, needs CTA to r/o BCVI.  Will repeat CT brain to r/o delayed ICH, although metabolic encephalopathy from dehydration and DKA more likely cause of AMS.  Appears to remain hypovolemic on exam today.  Will bolus and continue IV fluids.  Hypokalemia being corrected.  Insulin drip was on hold for this but anion gap opening again.  Restart drip.  Will need to remain on drip even if needs dextrose added to fluid.  If mentation continues to clear would consider dysphagia screen and possible diet if safe.  Once consistent PO intake will switch from insulin drip to lantus.  Given significant neoplastic diseases on CT scan will consult palliative care to help define goals of care.  To this point no surrogate decision maker identified, pt does not currently have capacity for any medical decisions. Will treat as medically necessary.      35 minutes of critical care time spent providing medical care for patient's acute illness/conditions that impairs at least one vital organ system and/or poses a high risk of imminent or life threatening deterioration in the patient's condition. It includes time spent evaluating and treating the patient's acute illness as well as time spent reviewing labs, radiology, discussing goals of care with patient and/or patient's family, and discussing the case with a multidisciplinary team in an effort to prevent further life threatening deterioration or end organ damage. This time is independent of any procedures performed.
Seen and examined.    NAD  Awake and alert  RRR  dry MM  normal skin tone, poor turgor  non labored resp  trachea midline, mouth locked open  no pedal edema    Reviewed prior CT imaging.  Confirms that pt has bilateral mandibular dislocation at time of arrival.  Reduced by myself at bedside at approx. 1200.  C collar tightened, refit to hold jaw closed and provide appropriate immobilization with chin in new position.   Mentation continues to improve.   DKA improved.  Dysphagia screen at 1400.  Liquid diet if passes.  If gap remains closed w/o insulin drip this PM will be eligible to downgrade from SICU. No vertebral artery injury.  MRI per spine team once safe for procedure.
Discharge planning to [facility] pending CBC.
Eventual JESSICA.  Discussed with patient, JESSICA and RN.
Discussed with patient and RN.
Discharge planning.   Duoneb ordered for wheezing.

## 2019-06-07 NOTE — PROGRESS NOTE ADULT - PROVIDER SPECIALTY LIST ADULT
Endocrinology
Heme/Onc
Hospitalist
Orthopedics
Palliative Care
Palliative Care
SICU
Trauma Surgery
SICU
Hospitalist
Hospitalist
Palliative Care
SICU
Hospitalist

## 2019-06-07 NOTE — PROGRESS NOTE ADULT - PROBLEM SELECTOR PLAN 1
C collar in place, pain control prn, needs JESSICA.
C collar in place, pain control prn, ortho follow up noted, JESSICA on discharge.
C collar in place, pain control prn, needs JESSICA.
C collar in place, pain control prn, ortho following, needs JESSICA.

## 2019-06-07 NOTE — PROGRESS NOTE ADULT - PROBLEM SELECTOR PLAN 4
Resolved. Low blood sugar this am. Decrease Lantus to 16 units, monitor FS.
Resolved, endocrine following. Continue Lantus and pre meal insulin, monitor FS.
Resolved, endocrine following. Continue Lantus and pre meal insulin, monitor FS.
Resolved. Fingersticks better today, continue Lantus to 16 units, monitor FS.

## 2019-06-07 NOTE — PROGRESS NOTE ADULT - PROBLEM/PLAN-7
"Compliance check:  Dialysis unit reports in the past three months pt has had 0 no shows, 0 AMAs, (SW reports patient had been missing "here and there" at the previous unit, [facility] due to problems with transportation.  Pt now has [facility] and is dialyzing at [facility].  Pt has moved around to different units.  Labs: no concerns, however, pt "He has memory problems and often reports he forgets his medicine.  I can see him forgetting his transplant medications as he has no oversight", transportation currently uses [facility].  Family is not dependable, per SW; and family support: per sw, "there is lots of family, but no one is consistent.  Sister works, brother and father are not dependable, no one appears to be very close to the patient."  SW stated, "I feel real bad saying this, but I think he is not a good candidate for transplant".    Reported by GIOVANNA Riggs    "
DISPLAY PLAN FREE TEXT
DISPLAY PLAN FREE TEXT

## 2019-06-07 NOTE — PROGRESS NOTE ADULT - SUBJECTIVE AND OBJECTIVE BOX
INTERVAL HPI/OVERNIGHT EVENTS:    CC: anemia, cervical spine fracture, s/p fall, metastatic lung ca, diabetes mellitus, hypertension    No overnight events. Mild 'wheezing'. No cough, shortness of breath.    Vital Signs Last 24 Hrs  T(C): 36.6 (07 Jun 2019 08:14), Max: 36.6 (06 Jun 2019 15:25)  T(F): 97.9 (07 Jun 2019 08:14), Max: 97.9 (06 Jun 2019 15:25)  HR: 74 (07 Jun 2019 08:14) (70 - 86)  BP: 134/85 (07 Jun 2019 08:14) (134/85 - 160/92)  BP(mean): --  RR: 19 (07 Jun 2019 08:14) (17 - 19)  SpO2: 95% (07 Jun 2019 08:14) (95% - 97%)    PHYSICAL EXAM:    GENERAL: Not in distress, alert  CHEST/LUNG: b/l wheezing  HEART: Regular  ABDOMEN: Soft, BS+  EXTREMITIES:  no edema, tenderness.    MEDICATIONS  (STANDING):  ALBUTerol/ipratropium for Nebulization. 3 milliLiter(s) Nebulizer once  cholecalciferol 1000 Unit(s) Oral daily  dextrose 5%. 1000 milliLiter(s) (50 mL/Hr) IV Continuous <Continuous>  dextrose 50% Injectable 12.5 Gram(s) IV Push once  dextrose 50% Injectable 25 Gram(s) IV Push once  dextrose 50% Injectable 25 Gram(s) IV Push once  heparin  Injectable 5000 Unit(s) SubCutaneous every 8 hours  insulin glargine Injectable (LANTUS) 16 Unit(s) SubCutaneous at bedtime  insulin lispro (HumaLOG) corrective regimen sliding scale   SubCutaneous three times a day before meals  insulin lispro (HumaLOG) corrective regimen sliding scale   SubCutaneous at bedtime  insulin lispro Injectable (HumaLOG) 8 Unit(s) SubCutaneous three times a day with meals  losartan 50 milliGRAM(s) Oral daily  metoprolol tartrate 50 milliGRAM(s) Oral two times a day  simvastatin 20 milliGRAM(s) Oral at bedtime    MEDICATIONS  (PRN):  dextrose 40% Gel 15 Gram(s) Oral once PRN Blood Glucose LESS THAN 70 milliGRAM(s)/deciliter  glucagon  Injectable 1 milliGRAM(s) IntraMuscular once PRN Glucose LESS THAN 70 milligrams/deciliter      Allergies    Allergy Status Unknown    Intolerances          LABS:                          9.1    9.0   )-----------( 223      ( 07 Jun 2019 08:27 )             30.4     06-07    141  |  104  |  10.0  ----------------------------<  139<H>  3.9   |  27.0  |  0.45<L>    Ca    9.4      07 Jun 2019 08:27  Phos  3.4     06-07  Mg     1.7     06-07            RADIOLOGY & ADDITIONAL TESTS:

## 2019-06-07 NOTE — PROGRESS NOTE ADULT - PROBLEM SELECTOR PROBLEM 1
Cervical spine fracture
Closed nondisplaced fracture of sixth cervical vertebra, unspecified fracture morphology, initial encounter

## 2019-06-07 NOTE — PROGRESS NOTE ADULT - PROBLEM SELECTOR PLAN 2
Needs JESSICA, s/p reduction of mandibular fracture in ICU.

## 2019-06-07 NOTE — PROGRESS NOTE ADULT - PROBLEM SELECTOR PLAN 7
Discussed labs with patient, outpatient follow up with GI/hepatology.
Discussed labs with patient, outpatient follow up with GI/hepatology.

## 2019-06-07 NOTE — PROGRESS NOTE ADULT - PROBLEM SELECTOR PLAN 6
Multifactorial, monitor Hb. s/p PRBC transfusion. GI evaluation noted.
Transfuse 1 unit PRBC today, monitor CBC. Likely anemia of chronic disease and possible related to colon mass.
Hb 7.6 despite transfusion. Discussed with patient CT abdomen findings, + Hep C, states he is aware of Hep C, but never got treatment or follow up.  GI evaluation requested for input given persistent anemia. 2 units of PRBC transfusion ordered.
Hb stable. Outpatient follow up with oncology. Multifactorial.

## 2019-06-07 NOTE — PROGRESS NOTE ADULT - PROBLEM SELECTOR PLAN 5
Continue Metoprolol and Losartan, monitor BP.

## 2019-06-07 NOTE — PROGRESS NOTE ADULT - ASSESSMENT
72 yr old male with hypertension, hyperlipidemia, diabetes mellitus, advanced non small cell lung cancer presented to the ED as code trauma after fall from a flight of stairs. Imaging revealed a  traumatic C6 spinous process fracture, cervical collar was placed. Orthopedics consulted. He was admitted to SICU, medical ICU was called as patient was noted to be in DKA. Insulin gtt was initiated. CTA chest on admission also revealed Bilateral pulmonary masses and extensive mediastinal/abdominal adenopathy, consistent with neoplasm.Hepatic lesion, consistent with metastasis.Mural thickening of the ascending colon, possibly neoplasm. Possible lytic osseous metastases. Palliative care was consulted, patient was on chemotherapy but was lost to follow up. CTA neck was done, showed bilateral mandibular fractures, which was reduced in SICU. Endocrinology was consulted, DKA resolved. Lantus was initiated. Initially placed NPO given mandibular fractures. He was transferred to medical service on 5/31. Speech and swallow evaluation was done, advised mechanical soft diet. Oncology was consulted for metastatic lung cancer, colon lesion, advised patient has been on multiple therapies in the past for lung cancer, including immunotherapy, and he had not tolerated chemotherapy well. Advised outpatient follow up for further work up for colon lesion. He received a PRBC transfusion on 6/3 for Hb 6.5, Hb improved to 7.7. He was given a 1 PRBC on 6/4, but Hb remained at 7.6. GI was consulted for further evaluation of colon mass noted on CT abdomen given persistent anemia and need for transfusions. Discussed goals of care with patient, he wishes to pursue with chemotherapy as an outpatient. His Hb remained stable.

## 2019-06-10 PROBLEM — C34.90 MALIGNANT NEOPLASM OF UNSPECIFIED PART OF UNSPECIFIED BRONCHUS OR LUNG: Chronic | Status: ACTIVE | Noted: 2017-06-19

## 2019-06-10 RX ORDER — LOSARTAN POTASSIUM 100 MG/1
1 TABLET, FILM COATED ORAL
Qty: 0 | Refills: 0 | DISCHARGE

## 2019-06-10 RX ORDER — INSULIN NPH HUM/REG INSULIN HM 70-30/ML
20 VIAL (ML) SUBCUTANEOUS
Qty: 0 | Refills: 0 | DISCHARGE

## 2019-06-10 RX ORDER — AMLODIPINE BESYLATE 2.5 MG/1
1 TABLET ORAL
Qty: 0 | Refills: 0 | DISCHARGE

## 2019-06-10 RX ORDER — ERGOCALCIFEROL 1.25 MG/1
1 CAPSULE ORAL
Qty: 0 | Refills: 0 | DISCHARGE

## 2019-06-10 RX ORDER — ALBUTEROL 90 UG/1
2 AEROSOL, METERED ORAL
Qty: 0 | Refills: 0 | DISCHARGE

## 2019-06-10 RX ORDER — FOLIC ACID 0.8 MG
1 TABLET ORAL
Qty: 0 | Refills: 0 | DISCHARGE

## 2019-06-10 RX ORDER — SIMVASTATIN 20 MG/1
1 TABLET, FILM COATED ORAL
Qty: 0 | Refills: 0 | DISCHARGE

## 2019-06-10 RX ORDER — INSULIN DEGLUDEC 100 U/ML
50 INJECTION, SOLUTION SUBCUTANEOUS
Qty: 0 | Refills: 0 | DISCHARGE

## 2019-06-10 RX ORDER — METOPROLOL TARTRATE 50 MG
1 TABLET ORAL
Qty: 0 | Refills: 0 | DISCHARGE

## 2019-06-13 DIAGNOSIS — Z71.89 OTHER SPECIFIED COUNSELING: ICD-10-CM

## 2019-06-13 PROBLEM — Z78.9 OTHER SPECIFIED HEALTH STATUS: Chronic | Status: ACTIVE | Noted: 2019-05-29

## 2019-06-18 ENCOUNTER — OTHER (OUTPATIENT)
Age: 69
End: 2019-06-18

## 2019-06-28 ENCOUNTER — APPOINTMENT (OUTPATIENT)
Dept: ORTHOPEDIC SURGERY | Facility: CLINIC | Age: 69
End: 2019-06-28
Payer: MEDICARE

## 2019-06-28 VITALS
DIASTOLIC BLOOD PRESSURE: 80 MMHG | HEART RATE: 88 BPM | BODY MASS INDEX: 33.57 KG/M2 | WEIGHT: 270 LBS | HEIGHT: 75 IN | SYSTOLIC BLOOD PRESSURE: 120 MMHG

## 2019-06-28 DIAGNOSIS — Z86.39 PERSONAL HISTORY OF OTHER ENDOCRINE, NUTRITIONAL AND METABOLIC DISEASE: ICD-10-CM

## 2019-06-28 DIAGNOSIS — C34.90 MALIGNANT NEOPLASM OF UNSPECIFIED PART OF UNSPECIFIED BRONCHUS OR LUNG: ICD-10-CM

## 2019-06-28 DIAGNOSIS — Z86.79 PERSONAL HISTORY OF OTHER DISEASES OF THE CIRCULATORY SYSTEM: ICD-10-CM

## 2019-06-28 DIAGNOSIS — S12.9XXD FRACTURE OF NECK, UNSPECIFIED, SUBSEQUENT ENCOUNTER: ICD-10-CM

## 2019-06-28 PROCEDURE — 99024 POSTOP FOLLOW-UP VISIT: CPT

## 2019-06-28 PROCEDURE — 72040 X-RAY EXAM NECK SPINE 2-3 VW: CPT

## 2019-06-28 NOTE — REVIEW OF SYSTEMS
[Joint Pain] : joint pain [Negative] : Endocrine [Fever] : no fever [Chills] : no chills [Cough] : no cough [SOB on Exertion] : no shortness of breath on exertion

## 2019-06-28 NOTE — HISTORY OF PRESENT ILLNESS
[de-identified] : 69 year old M presents s/p slip and fall resulting in C6 transverse process fx on 6/2/2019. He does have hx of advanced lung CA. At this time, he has no neck pain, no radiculitis of the arms or legs, and does not feel any onset weakness. He would like to remove his cervical collar for good as he has not been using it anyway. He is ambulating with assistance at his long term care facility. [Ataxia] : no ataxia [Incontinence] : no incontinence [Urinary Ret.] : no urinary retention [Loss of Dexterity] : good dexterity

## 2019-06-28 NOTE — HISTORY OF PRESENT ILLNESS
[de-identified] : 69 year old M presents s/p slip and fall resulting in C6 transverse process fx on 6/2/2019. He does have hx of advanced lung CA. At this time, he has no neck pain, no radiculitis of the arms or legs, and does not feel any onset weakness. He would like to remove his cervical collar for good as he has not been using it anyway. He is ambulating with assistance at his long term care facility. [Ataxia] : no ataxia [Incontinence] : no incontinence [Urinary Ret.] : no urinary retention [Loss of Dexterity] : good dexterity

## 2019-06-28 NOTE — PHYSICAL EXAM
[Poor Appearance] : well-appearing [Acute Distress] : not in acute distress [de-identified] : CONSTITUTIONAL: Patient is a very pleasant individual who is well-nourished and appears stated age. \par PSYCHIATRIC: Alert and oriented times three and in no apparent distress, and participates with orthopedic evaluation well.\par HEAD: Atraumatic and nonsyndromic in appearance.\par EENT: No thyromegaly, EOMI.\par RESPIRATORY: Respiratory rate is regular, not dyspneic on examination.\par LYMPHATICS: There is no cervical or axillary lymphadenopathy.\par INTEGUMENTARY: Skin is clean, dry, and intact about the bilateral upper extremities and cervical spine. \par VASCULAR: There is brisk capillary refill about the bilateral upper extremities and radial pulses are 2/4. \par NEUROLOGIC: Negative L'hirmitte, negative Spurling’s sign. There are no pathologic reflexes. There is no decrease in sensation of the bilateral upper extremities on Wartenberg pinwheel examination. Deep tendon reflexes are well-maintained at +2/4 of the bilateral upper extremities and are symmetric.\par MUSCULOSKELETAL: There is no visible muscular atrophy. Manual motor strength is well maintained in the bilateral upper extremities. Cervical range of motion is well maintained. The patient ambulates in a non-myelopathic manner. Normal secondary orthopaedic exam of bilateral shoulders, elbows and hands. Elbow flexion and extension, wrist extension, finger flexion and abduction are well maintained. \par \par accompanied by his wife. seated in a wheelchair and is on oxygen. no neck pain on ROM or palpation. no exhibiting and weakness of arms or legs. he is able to stand with assistance and is generally frail due to advanced metastatic lung CA and oxygen dependance. [de-identified] : X-ray of the cervical spine taken today shows nondisplaced C6 transverse process fx is noted.

## 2019-06-28 NOTE — DISCUSSION/SUMMARY
[de-identified] : I have recommended that the pt continue with a conservative treatment plan. Remove cervical collar. If he chooses, he can use a soft collar when out of bed. He would be WBAT. Balance and gait training with assistance and / or assistive device. The patient will follow up in 8 weeks for a repeat clinical assessment and XR of cervical spine.

## 2019-06-28 NOTE — ADDENDUM
[FreeTextEntry1] : Documented by Bartolo Monge acting as a scribe for DENNYS Sarkar on 06/28/2019\par All medical record entries made by the Scribe were at my, DENNYS Sarkar, direction and personally dictated by me on 06/28/2019 . I have reviewed the chart and agree that the record accurately reflects my personal performance of the history, physical exam, assessment and plan. I have also personally directed, reviewed, and agreed with the chart.

## 2019-06-28 NOTE — DISCUSSION/SUMMARY
[de-identified] : I have recommended that the pt continue with a conservative treatment plan. Remove cervical collar. If he chooses, he can use a soft collar when out of bed. He would be WBAT. Balance and gait training with assistance and / or assistive device. The patient will follow up in 8 weeks for a repeat clinical assessment and XR of cervical spine.

## 2019-07-01 PROCEDURE — G9001: CPT

## 2022-04-07 NOTE — ASU PREOP CHECKLIST - TAMPON REMOVED
Problem: Adult Inpatient Plan of Care  Goal: Plan of Care Review  Outcome: Ongoing, Progressing  Goal: Patient-Specific Goal (Individualized)  Outcome: Ongoing, Progressing  Goal: Absence of Hospital-Acquired Illness or Injury  Outcome: Ongoing, Progressing  Goal: Optimal Comfort and Wellbeing  Outcome: Ongoing, Progressing  Goal: Readiness for Transition of Care  Outcome: Ongoing, Progressing     Problem: Fall Injury Risk  Goal: Absence of Fall and Fall-Related Injury  Outcome: Ongoing, Progressing   Pt free from fall, trauma and skin breakdown. Denied pain or discomfort. Free from SOB. Plan of care review. Pt verbalized understanding. All questions and concern adressed.  Bed in lowest position, locked, call bell in reach. Will continue to monitor patient.     n/a

## 2024-04-03 NOTE — ED ADULT NURSE NOTE - OBJECTIVE STATEMENT
Received pt from Trauma.  Ems reported that pt was found at the bottom of 12 stairs.  Pt arrived with Dickenson collar.  Pt does not answer questions or  follow commands when asked if he is in pain, he points to the Dickenson collar.  Pt told trauma surgeon that he has cancer but that was the only time he spoke.  Pt is awake and moaning, Unknown LOC, skin intact, no obvious injuries noted, resp even/unlabored. Yes

## 2024-04-19 NOTE — H&P ADULT - PMH
Cough    Diabetes    DM (diabetes mellitus)    Hepatitis C  diagnosed a few years ago as per pt no treatment. iv drug use as teenager  High cholesterol    HTN (hypertension)    Hypertension    Lung nodule    Non-small cell lung cancer, unspecified laterality
DISPLAY PLAN FREE TEXT